# Patient Record
Sex: FEMALE | Race: WHITE | NOT HISPANIC OR LATINO | URBAN - METROPOLITAN AREA
[De-identification: names, ages, dates, MRNs, and addresses within clinical notes are randomized per-mention and may not be internally consistent; named-entity substitution may affect disease eponyms.]

---

## 2022-02-18 ENCOUNTER — OUTPATIENT (OUTPATIENT)
Dept: OUTPATIENT SERVICES | Facility: HOSPITAL | Age: 32
LOS: 1 days | End: 2022-02-18
Payer: COMMERCIAL

## 2022-02-18 VITALS
HEIGHT: 65 IN | TEMPERATURE: 98 F | OXYGEN SATURATION: 98 % | DIASTOLIC BLOOD PRESSURE: 85 MMHG | WEIGHT: 179.9 LBS | SYSTOLIC BLOOD PRESSURE: 125 MMHG | HEART RATE: 76 BPM | RESPIRATION RATE: 16 BRPM

## 2022-02-18 DIAGNOSIS — Z01.818 ENCOUNTER FOR OTHER PREPROCEDURAL EXAMINATION: ICD-10-CM

## 2022-02-18 DIAGNOSIS — F41.9 ANXIETY DISORDER, UNSPECIFIED: ICD-10-CM

## 2022-02-18 DIAGNOSIS — R10.9 UNSPECIFIED ABDOMINAL PAIN: ICD-10-CM

## 2022-02-18 DIAGNOSIS — Z98.1 ARTHRODESIS STATUS: Chronic | ICD-10-CM

## 2022-02-18 DIAGNOSIS — N83.209 UNSPECIFIED OVARIAN CYST, UNSPECIFIED SIDE: ICD-10-CM

## 2022-02-18 LAB
ALBUMIN SERPL ELPH-MCNC: 4 G/DL — SIGNIFICANT CHANGE UP (ref 3.3–5)
ALP SERPL-CCNC: 75 U/L — SIGNIFICANT CHANGE UP (ref 40–120)
ALT FLD-CCNC: 24 U/L — SIGNIFICANT CHANGE UP (ref 12–78)
ANION GAP SERPL CALC-SCNC: 5 MMOL/L — SIGNIFICANT CHANGE UP (ref 5–17)
AST SERPL-CCNC: 18 U/L — SIGNIFICANT CHANGE UP (ref 15–37)
BILIRUB SERPL-MCNC: 0.2 MG/DL — SIGNIFICANT CHANGE UP (ref 0.2–1.2)
BUN SERPL-MCNC: 10 MG/DL — SIGNIFICANT CHANGE UP (ref 7–23)
CALCIUM SERPL-MCNC: 9.1 MG/DL — SIGNIFICANT CHANGE UP (ref 8.5–10.1)
CHLORIDE SERPL-SCNC: 110 MMOL/L — HIGH (ref 96–108)
CO2 SERPL-SCNC: 26 MMOL/L — SIGNIFICANT CHANGE UP (ref 22–31)
CREAT SERPL-MCNC: 0.82 MG/DL — SIGNIFICANT CHANGE UP (ref 0.5–1.3)
GLUCOSE SERPL-MCNC: 99 MG/DL — SIGNIFICANT CHANGE UP (ref 70–99)
HCG SERPL-ACNC: <1 MIU/ML — SIGNIFICANT CHANGE UP
HCT VFR BLD CALC: 41.1 % — SIGNIFICANT CHANGE UP (ref 34.5–45)
HGB BLD-MCNC: 13.7 G/DL — SIGNIFICANT CHANGE UP (ref 11.5–15.5)
MCHC RBC-ENTMCNC: 29.9 PG — SIGNIFICANT CHANGE UP (ref 27–34)
MCHC RBC-ENTMCNC: 33.3 GM/DL — SIGNIFICANT CHANGE UP (ref 32–36)
MCV RBC AUTO: 89.7 FL — SIGNIFICANT CHANGE UP (ref 80–100)
NRBC # BLD: 0 /100 WBCS — SIGNIFICANT CHANGE UP (ref 0–0)
PLATELET # BLD AUTO: 302 K/UL — SIGNIFICANT CHANGE UP (ref 150–400)
POTASSIUM SERPL-MCNC: 4.2 MMOL/L — SIGNIFICANT CHANGE UP (ref 3.5–5.3)
POTASSIUM SERPL-SCNC: 4.2 MMOL/L — SIGNIFICANT CHANGE UP (ref 3.5–5.3)
PROT SERPL-MCNC: 7.6 G/DL — SIGNIFICANT CHANGE UP (ref 6–8.3)
RBC # BLD: 4.58 M/UL — SIGNIFICANT CHANGE UP (ref 3.8–5.2)
RBC # FLD: 11.5 % — SIGNIFICANT CHANGE UP (ref 10.3–14.5)
SODIUM SERPL-SCNC: 141 MMOL/L — SIGNIFICANT CHANGE UP (ref 135–145)
WBC # BLD: 6.56 K/UL — SIGNIFICANT CHANGE UP (ref 3.8–10.5)
WBC # FLD AUTO: 6.56 K/UL — SIGNIFICANT CHANGE UP (ref 3.8–10.5)

## 2022-02-18 PROCEDURE — G0463: CPT

## 2022-02-18 PROCEDURE — 84702 CHORIONIC GONADOTROPIN TEST: CPT

## 2022-02-18 PROCEDURE — 36415 COLL VENOUS BLD VENIPUNCTURE: CPT

## 2022-02-18 PROCEDURE — 86900 BLOOD TYPING SEROLOGIC ABO: CPT

## 2022-02-18 PROCEDURE — 85027 COMPLETE CBC AUTOMATED: CPT

## 2022-02-18 PROCEDURE — 80053 COMPREHEN METABOLIC PANEL: CPT

## 2022-02-18 PROCEDURE — 86901 BLOOD TYPING SEROLOGIC RH(D): CPT

## 2022-02-18 PROCEDURE — 86850 RBC ANTIBODY SCREEN: CPT

## 2022-02-18 NOTE — H&P PST ADULT - HISTORY OF PRESENT ILLNESS
30 yo F with h/o anxiety c/o missed  2021- s/p pelvic sonogram revealed ovarian cyst- scheduled for dilation & curettage hysteroscopy/laparoscopic right ovarian cystectomy on 22  **Pt denies any fever, chills, or sick contacts  **Covid 19 PCR to be scheduled

## 2022-02-18 NOTE — H&P PST ADULT - NSANTHOSAYNRD_GEN_A_CORE
No. LEX screening performed.  STOP BANG Legend: 0-2 = LOW Risk; 3-4 = INTERMEDIATE Risk; 5-8 = HIGH Risk

## 2022-02-18 NOTE — H&P PST ADULT - NSICDXFAMHXNEG_GEN_ALL
asthma/brain aneurysm/COPD/coronary disease/diabetes/dementia/emphysema/heart disease/irritable bowel syndrome/kidney disease

## 2022-02-18 NOTE — H&P PST ADULT - PROBLEM SELECTOR PLAN 1
D&C hysteroscopy, Laparoscopic right ovarian cystectomy- ligasure  Labs- CBC, CMP, HCG, T&S  Pre op instructions discussed

## 2022-02-24 ENCOUNTER — TRANSCRIPTION ENCOUNTER (OUTPATIENT)
Age: 32
End: 2022-02-24

## 2022-02-24 RX ORDER — HYDROMORPHONE HYDROCHLORIDE 2 MG/ML
0.5 INJECTION INTRAMUSCULAR; INTRAVENOUS; SUBCUTANEOUS ONCE
Refills: 0 | Status: DISCONTINUED | OUTPATIENT
Start: 2022-02-25 | End: 2022-02-25

## 2022-02-24 RX ORDER — ONDANSETRON 8 MG/1
4 TABLET, FILM COATED ORAL ONCE
Refills: 0 | Status: DISCONTINUED | OUTPATIENT
Start: 2022-02-25 | End: 2022-02-26

## 2022-02-24 RX ORDER — ACETAMINOPHEN 500 MG
1000 TABLET ORAL ONCE
Refills: 0 | Status: DISCONTINUED | OUTPATIENT
Start: 2022-02-25 | End: 2022-02-26

## 2022-02-24 RX ORDER — HYDROMORPHONE HYDROCHLORIDE 2 MG/ML
1 INJECTION INTRAMUSCULAR; INTRAVENOUS; SUBCUTANEOUS ONCE
Refills: 0 | Status: DISCONTINUED | OUTPATIENT
Start: 2022-02-25 | End: 2022-02-26

## 2022-02-24 RX ORDER — SODIUM CHLORIDE 9 MG/ML
1000 INJECTION, SOLUTION INTRAVENOUS
Refills: 0 | Status: DISCONTINUED | OUTPATIENT
Start: 2022-02-25 | End: 2022-02-25

## 2022-02-25 ENCOUNTER — INPATIENT (INPATIENT)
Facility: HOSPITAL | Age: 32
LOS: 5 days | Discharge: HOME | DRG: 742 | End: 2022-03-03
Attending: STUDENT IN AN ORGANIZED HEALTH CARE EDUCATION/TRAINING PROGRAM | Admitting: STUDENT IN AN ORGANIZED HEALTH CARE EDUCATION/TRAINING PROGRAM
Payer: COMMERCIAL

## 2022-02-25 ENCOUNTER — RESULT REVIEW (OUTPATIENT)
Age: 32
End: 2022-02-25

## 2022-02-25 VITALS
DIASTOLIC BLOOD PRESSURE: 71 MMHG | OXYGEN SATURATION: 99 % | RESPIRATION RATE: 16 BRPM | SYSTOLIC BLOOD PRESSURE: 115 MMHG | HEART RATE: 74 BPM | TEMPERATURE: 98 F

## 2022-02-25 DIAGNOSIS — N83.209 UNSPECIFIED OVARIAN CYST, UNSPECIFIED SIDE: ICD-10-CM

## 2022-02-25 DIAGNOSIS — R10.9 UNSPECIFIED ABDOMINAL PAIN: ICD-10-CM

## 2022-02-25 DIAGNOSIS — Z98.1 ARTHRODESIS STATUS: Chronic | ICD-10-CM

## 2022-02-25 LAB
ALBUMIN SERPL ELPH-MCNC: 2.3 G/DL — LOW (ref 3.3–5)
ALP SERPL-CCNC: 39 U/L — LOW (ref 40–120)
ALT FLD-CCNC: 17 U/L — SIGNIFICANT CHANGE UP (ref 12–78)
ANION GAP SERPL CALC-SCNC: 8 MMOL/L — SIGNIFICANT CHANGE UP (ref 5–17)
APTT BLD: 23.5 SEC — LOW (ref 27.5–35.5)
AST SERPL-CCNC: 19 U/L — SIGNIFICANT CHANGE UP (ref 15–37)
BASE EXCESS BLDA CALC-SCNC: -5.3 MMOL/L — LOW (ref -2–3)
BASE EXCESS BLDA CALC-SCNC: -6.4 MMOL/L — LOW (ref -2–3)
BASOPHILS # BLD AUTO: 0 K/UL — SIGNIFICANT CHANGE UP (ref 0–0.2)
BASOPHILS NFR BLD AUTO: 0 % — SIGNIFICANT CHANGE UP (ref 0–2)
BILIRUB SERPL-MCNC: 0.4 MG/DL — SIGNIFICANT CHANGE UP (ref 0.2–1.2)
BLOOD GAS COMMENTS ARTERIAL: SIGNIFICANT CHANGE UP
BLOOD GAS COMMENTS ARTERIAL: SIGNIFICANT CHANGE UP
BUN SERPL-MCNC: 9 MG/DL — SIGNIFICANT CHANGE UP (ref 7–23)
CALCIUM SERPL-MCNC: 7.2 MG/DL — LOW (ref 8.5–10.1)
CHLORIDE SERPL-SCNC: 115 MMOL/L — HIGH (ref 96–108)
CO2 SERPL-SCNC: 20 MMOL/L — LOW (ref 22–31)
CREAT SERPL-MCNC: 0.88 MG/DL — SIGNIFICANT CHANGE UP (ref 0.5–1.3)
EOSINOPHIL # BLD AUTO: 0 K/UL — SIGNIFICANT CHANGE UP (ref 0–0.5)
EOSINOPHIL NFR BLD AUTO: 0 % — SIGNIFICANT CHANGE UP (ref 0–6)
GLUCOSE SERPL-MCNC: 181 MG/DL — HIGH (ref 70–99)
HCO3 BLDA-SCNC: 19 MMOL/L — LOW (ref 21–28)
HCO3 BLDA-SCNC: 21 MMOL/L — SIGNIFICANT CHANGE UP (ref 21–28)
HCT VFR BLD CALC: 27.1 % — LOW (ref 34.5–45)
HCT VFR BLD CALC: 30.8 % — LOW (ref 34.5–45)
HCT VFR BLD CALC: 31 % — LOW (ref 34.5–45)
HCT VFR BLD CALC: 36.2 % — SIGNIFICANT CHANGE UP (ref 34.5–45)
HCT VFR BLD CALC: 37.6 % — SIGNIFICANT CHANGE UP (ref 34.5–45)
HGB BLD-MCNC: 10.5 G/DL — LOW (ref 11.5–15.5)
HGB BLD-MCNC: 10.9 G/DL — LOW (ref 11.5–15.5)
HGB BLD-MCNC: 12.6 G/DL — SIGNIFICANT CHANGE UP (ref 11.5–15.5)
HGB BLD-MCNC: 13 G/DL — SIGNIFICANT CHANGE UP (ref 11.5–15.5)
HGB BLD-MCNC: 9.3 G/DL — LOW (ref 11.5–15.5)
HOROWITZ INDEX BLDA+IHG-RTO: 40 — SIGNIFICANT CHANGE UP
INR BLD: 1.21 RATIO — HIGH (ref 0.88–1.16)
LACTATE SERPL-SCNC: 2.4 MMOL/L — HIGH (ref 0.7–2)
LACTATE SERPL-SCNC: 4.7 MMOL/L — CRITICAL HIGH (ref 0.7–2)
LACTATE SERPL-SCNC: 6 MMOL/L — CRITICAL HIGH (ref 0.7–2)
LYMPHOCYTES # BLD AUTO: 0.58 K/UL — LOW (ref 1–3.3)
LYMPHOCYTES # BLD AUTO: 2 % — LOW (ref 13–44)
MAGNESIUM SERPL-MCNC: 1.4 MG/DL — LOW (ref 1.6–2.6)
MCHC RBC-ENTMCNC: 29.4 PG — SIGNIFICANT CHANGE UP (ref 27–34)
MCHC RBC-ENTMCNC: 29.8 PG — SIGNIFICANT CHANGE UP (ref 27–34)
MCHC RBC-ENTMCNC: 30 PG — SIGNIFICANT CHANGE UP (ref 27–34)
MCHC RBC-ENTMCNC: 30.4 PG — SIGNIFICANT CHANGE UP (ref 27–34)
MCHC RBC-ENTMCNC: 30.4 PG — SIGNIFICANT CHANGE UP (ref 27–34)
MCHC RBC-ENTMCNC: 34.1 GM/DL — SIGNIFICANT CHANGE UP (ref 32–36)
MCHC RBC-ENTMCNC: 34.3 GM/DL — SIGNIFICANT CHANGE UP (ref 32–36)
MCHC RBC-ENTMCNC: 34.6 GM/DL — SIGNIFICANT CHANGE UP (ref 32–36)
MCHC RBC-ENTMCNC: 34.8 GM/DL — SIGNIFICANT CHANGE UP (ref 32–36)
MCHC RBC-ENTMCNC: 35.2 GM/DL — SIGNIFICANT CHANGE UP (ref 32–36)
MCV RBC AUTO: 84.6 FL — SIGNIFICANT CHANGE UP (ref 80–100)
MCV RBC AUTO: 86.2 FL — SIGNIFICANT CHANGE UP (ref 80–100)
MCV RBC AUTO: 86.6 FL — SIGNIFICANT CHANGE UP (ref 80–100)
MCV RBC AUTO: 88 FL — SIGNIFICANT CHANGE UP (ref 80–100)
MCV RBC AUTO: 88.6 FL — SIGNIFICANT CHANGE UP (ref 80–100)
MONOCYTES # BLD AUTO: 1.45 K/UL — HIGH (ref 0–0.9)
MONOCYTES NFR BLD AUTO: 5 % — SIGNIFICANT CHANGE UP (ref 2–14)
NEUTROPHILS # BLD AUTO: 26.9 K/UL — HIGH (ref 1.8–7.4)
NEUTROPHILS NFR BLD AUTO: 90 % — HIGH (ref 43–77)
NRBC # BLD: 0 /100 WBCS — SIGNIFICANT CHANGE UP (ref 0–0)
NRBC # BLD: SIGNIFICANT CHANGE UP /100 WBCS (ref 0–0)
PCO2 BLDA: 36 MMHG — HIGH (ref 32–35)
PCO2 BLDA: 39 MMHG — HIGH (ref 32–35)
PH BLDA: 7.33 — LOW (ref 7.35–7.45)
PH BLDA: 7.34 — LOW (ref 7.35–7.45)
PHOSPHATE SERPL-MCNC: 4 MG/DL — SIGNIFICANT CHANGE UP (ref 2.5–4.5)
PLATELET # BLD AUTO: 205 K/UL — SIGNIFICANT CHANGE UP (ref 150–400)
PLATELET # BLD AUTO: 215 K/UL — SIGNIFICANT CHANGE UP (ref 150–400)
PLATELET # BLD AUTO: 249 K/UL — SIGNIFICANT CHANGE UP (ref 150–400)
PLATELET # BLD AUTO: 298 K/UL — SIGNIFICANT CHANGE UP (ref 150–400)
PLATELET # BLD AUTO: 314 K/UL — SIGNIFICANT CHANGE UP (ref 150–400)
PO2 BLDA: 137 MMHG — HIGH (ref 83–108)
PO2 BLDA: 351 MMHG — HIGH (ref 83–108)
POTASSIUM SERPL-MCNC: 4.1 MMOL/L — SIGNIFICANT CHANGE UP (ref 3.5–5.3)
POTASSIUM SERPL-SCNC: 4.1 MMOL/L — SIGNIFICANT CHANGE UP (ref 3.5–5.3)
PROCALCITONIN SERPL-MCNC: 0.18 NG/ML — HIGH (ref 0–0.04)
PROT SERPL-MCNC: 4.2 G/DL — LOW (ref 6–8.3)
PROTHROM AB SERPL-ACNC: 14.2 SEC — HIGH (ref 10.5–13.4)
RBC # BLD: 3.06 M/UL — LOW (ref 3.8–5.2)
RBC # BLD: 3.5 M/UL — LOW (ref 3.8–5.2)
RBC # BLD: 3.58 M/UL — LOW (ref 3.8–5.2)
RBC # BLD: 4.28 M/UL — SIGNIFICANT CHANGE UP (ref 3.8–5.2)
RBC # BLD: 4.36 M/UL — SIGNIFICANT CHANGE UP (ref 3.8–5.2)
RBC # FLD: 11.9 % — SIGNIFICANT CHANGE UP (ref 10.3–14.5)
RBC # FLD: 12.4 % — SIGNIFICANT CHANGE UP (ref 10.3–14.5)
RBC # FLD: 12.7 % — SIGNIFICANT CHANGE UP (ref 10.3–14.5)
RBC # FLD: 13.1 % — SIGNIFICANT CHANGE UP (ref 10.3–14.5)
RBC # FLD: 13.4 % — SIGNIFICANT CHANGE UP (ref 10.3–14.5)
SAO2 % BLDA: 99.2 % — HIGH (ref 94–98)
SAO2 % BLDA: 99.8 % — HIGH (ref 94–98)
SODIUM SERPL-SCNC: 143 MMOL/L — SIGNIFICANT CHANGE UP (ref 135–145)
WBC # BLD: 16.89 K/UL — HIGH (ref 3.8–10.5)
WBC # BLD: 19.43 K/UL — HIGH (ref 3.8–10.5)
WBC # BLD: 26.34 K/UL — HIGH (ref 3.8–10.5)
WBC # BLD: 28.92 K/UL — HIGH (ref 3.8–10.5)
WBC # BLD: 35.58 K/UL — HIGH (ref 3.8–10.5)
WBC # FLD AUTO: 16.89 K/UL — HIGH (ref 3.8–10.5)
WBC # FLD AUTO: 19.43 K/UL — HIGH (ref 3.8–10.5)
WBC # FLD AUTO: 26.34 K/UL — HIGH (ref 3.8–10.5)
WBC # FLD AUTO: 28.92 K/UL — HIGH (ref 3.8–10.5)
WBC # FLD AUTO: 35.58 K/UL — HIGH (ref 3.8–10.5)

## 2022-02-25 PROCEDURE — 49000 EXPLORATION OF ABDOMEN: CPT | Mod: 62,22

## 2022-02-25 PROCEDURE — 93308 TTE F-UP OR LMTD: CPT | Mod: 26

## 2022-02-25 PROCEDURE — 99292 CRITICAL CARE ADDL 30 MIN: CPT

## 2022-02-25 PROCEDURE — 71045 X-RAY EXAM CHEST 1 VIEW: CPT | Mod: 26

## 2022-02-25 PROCEDURE — 33320 REPAIR MAJOR BLOOD VESSEL(S): CPT | Mod: 62,22

## 2022-02-25 PROCEDURE — 76937 US GUIDE VASCULAR ACCESS: CPT | Mod: 26

## 2022-02-25 PROCEDURE — 99291 CRITICAL CARE FIRST HOUR: CPT

## 2022-02-25 PROCEDURE — 88305 TISSUE EXAM BY PATHOLOGIST: CPT | Mod: 26

## 2022-02-25 PROCEDURE — 33320 REPAIR MAJOR BLOOD VESSEL(S): CPT | Mod: 22,62

## 2022-02-25 PROCEDURE — 76604 US EXAM CHEST: CPT | Mod: 26

## 2022-02-25 PROCEDURE — 74174 CTA ABD&PLVS W/CONTRAST: CPT | Mod: 26

## 2022-02-25 PROCEDURE — 71275 CT ANGIOGRAPHY CHEST: CPT | Mod: 26

## 2022-02-25 DEVICE — SURGIFOAM PAD 8CM X 12.5CM X 10MM (100): Type: IMPLANTABLE DEVICE | Status: FUNCTIONAL

## 2022-02-25 DEVICE — CLIP APPLIER COVIDIEN SURGICLIP 11.5" MEDIUM: Type: IMPLANTABLE DEVICE | Status: FUNCTIONAL

## 2022-02-25 DEVICE — SURGICEL POWDER 3 GRAMS: Type: IMPLANTABLE DEVICE | Status: FUNCTIONAL

## 2022-02-25 RX ORDER — PROPOFOL 10 MG/ML
20 INJECTION, EMULSION INTRAVENOUS
Qty: 1000 | Refills: 0 | Status: DISCONTINUED | OUTPATIENT
Start: 2022-02-25 | End: 2022-02-26

## 2022-02-25 RX ORDER — HYDROMORPHONE HYDROCHLORIDE 2 MG/ML
0.5 INJECTION INTRAMUSCULAR; INTRAVENOUS; SUBCUTANEOUS EVERY 4 HOURS
Refills: 0 | Status: DISCONTINUED | OUTPATIENT
Start: 2022-02-25 | End: 2022-02-28

## 2022-02-25 RX ORDER — CHLORHEXIDINE GLUCONATE 213 G/1000ML
15 SOLUTION TOPICAL EVERY 12 HOURS
Refills: 0 | Status: DISCONTINUED | OUTPATIENT
Start: 2022-02-25 | End: 2022-02-26

## 2022-02-25 RX ORDER — CALCIUM GLUCONATE 100 MG/ML
2 VIAL (ML) INTRAVENOUS ONCE
Refills: 0 | Status: COMPLETED | OUTPATIENT
Start: 2022-02-25 | End: 2022-02-25

## 2022-02-25 RX ORDER — PIPERACILLIN AND TAZOBACTAM 4; .5 G/20ML; G/20ML
3.38 INJECTION, POWDER, LYOPHILIZED, FOR SOLUTION INTRAVENOUS EVERY 8 HOURS
Refills: 0 | Status: DISCONTINUED | OUTPATIENT
Start: 2022-02-25 | End: 2022-03-02

## 2022-02-25 RX ORDER — PANTOPRAZOLE SODIUM 20 MG/1
40 TABLET, DELAYED RELEASE ORAL DAILY
Refills: 0 | Status: DISCONTINUED | OUTPATIENT
Start: 2022-02-25 | End: 2022-02-26

## 2022-02-25 RX ORDER — ACETAMINOPHEN 500 MG
1000 TABLET ORAL ONCE
Refills: 0 | Status: COMPLETED | OUTPATIENT
Start: 2022-02-25 | End: 2022-02-25

## 2022-02-25 RX ORDER — SODIUM CHLORIDE 9 MG/ML
1000 INJECTION, SOLUTION INTRAVENOUS
Refills: 0 | Status: DISCONTINUED | OUTPATIENT
Start: 2022-02-25 | End: 2022-02-26

## 2022-02-25 RX ORDER — FENTANYL CITRATE 50 UG/ML
100 INJECTION INTRAVENOUS ONCE
Refills: 0 | Status: DISCONTINUED | OUTPATIENT
Start: 2022-02-25 | End: 2022-02-25

## 2022-02-25 RX ORDER — PIPERACILLIN AND TAZOBACTAM 4; .5 G/20ML; G/20ML
3.38 INJECTION, POWDER, LYOPHILIZED, FOR SOLUTION INTRAVENOUS ONCE
Refills: 0 | Status: COMPLETED | OUTPATIENT
Start: 2022-02-25 | End: 2022-02-25

## 2022-02-25 RX ORDER — NOREPINEPHRINE BITARTRATE/D5W 8 MG/250ML
0.05 PLASTIC BAG, INJECTION (ML) INTRAVENOUS
Qty: 8 | Refills: 0 | Status: DISCONTINUED | OUTPATIENT
Start: 2022-02-25 | End: 2022-02-25

## 2022-02-25 RX ORDER — HYDRALAZINE HCL 50 MG
10 TABLET ORAL EVERY 6 HOURS
Refills: 0 | Status: DISCONTINUED | OUTPATIENT
Start: 2022-02-25 | End: 2022-02-26

## 2022-02-25 RX ORDER — SODIUM CHLORIDE 9 MG/ML
1000 INJECTION, SOLUTION INTRAVENOUS ONCE
Refills: 0 | Status: COMPLETED | OUTPATIENT
Start: 2022-02-25 | End: 2022-02-25

## 2022-02-25 RX ORDER — SODIUM CHLORIDE 9 MG/ML
1000 INJECTION, SOLUTION INTRAVENOUS
Refills: 0 | Status: DISCONTINUED | OUTPATIENT
Start: 2022-02-25 | End: 2022-02-25

## 2022-02-25 RX ORDER — MAGNESIUM SULFATE 500 MG/ML
2 VIAL (ML) INJECTION ONCE
Refills: 0 | Status: COMPLETED | OUTPATIENT
Start: 2022-02-25 | End: 2022-02-25

## 2022-02-25 RX ORDER — DEXMEDETOMIDINE HYDROCHLORIDE IN 0.9% SODIUM CHLORIDE 4 UG/ML
0.2 INJECTION INTRAVENOUS
Qty: 400 | Refills: 0 | Status: DISCONTINUED | OUTPATIENT
Start: 2022-02-25 | End: 2022-02-25

## 2022-02-25 RX ORDER — CHLORHEXIDINE GLUCONATE 213 G/1000ML
1 SOLUTION TOPICAL
Refills: 0 | Status: DISCONTINUED | OUTPATIENT
Start: 2022-02-25 | End: 2022-03-03

## 2022-02-25 RX ORDER — DEXMEDETOMIDINE HYDROCHLORIDE IN 0.9% SODIUM CHLORIDE 4 UG/ML
0.2 INJECTION INTRAVENOUS
Qty: 200 | Refills: 0 | Status: DISCONTINUED | OUTPATIENT
Start: 2022-02-25 | End: 2022-02-25

## 2022-02-25 RX ADMIN — HYDROMORPHONE HYDROCHLORIDE 0.5 MILLIGRAM(S): 2 INJECTION INTRAMUSCULAR; INTRAVENOUS; SUBCUTANEOUS at 23:12

## 2022-02-25 RX ADMIN — SODIUM CHLORIDE 1000 MILLILITER(S): 9 INJECTION, SOLUTION INTRAVENOUS at 13:37

## 2022-02-25 RX ADMIN — PROPOFOL 11 MICROGRAM(S)/KG/MIN: 10 INJECTION, EMULSION INTRAVENOUS at 15:44

## 2022-02-25 RX ADMIN — CHLORHEXIDINE GLUCONATE 15 MILLILITER(S): 213 SOLUTION TOPICAL at 18:46

## 2022-02-25 RX ADMIN — HYDROMORPHONE HYDROCHLORIDE 0.5 MILLIGRAM(S): 2 INJECTION INTRAMUSCULAR; INTRAVENOUS; SUBCUTANEOUS at 20:34

## 2022-02-25 RX ADMIN — Medication 400 MILLIGRAM(S): at 18:46

## 2022-02-25 RX ADMIN — SODIUM CHLORIDE 125 MILLILITER(S): 9 INJECTION, SOLUTION INTRAVENOUS at 17:00

## 2022-02-25 RX ADMIN — PIPERACILLIN AND TAZOBACTAM 25 GRAM(S): 4; .5 INJECTION, POWDER, LYOPHILIZED, FOR SOLUTION INTRAVENOUS at 21:00

## 2022-02-25 RX ADMIN — Medication 25 GRAM(S): at 16:01

## 2022-02-25 RX ADMIN — Medication 10 MILLIGRAM(S): at 16:50

## 2022-02-25 RX ADMIN — PROPOFOL 11 MICROGRAM(S)/KG/MIN: 10 INJECTION, EMULSION INTRAVENOUS at 23:47

## 2022-02-25 RX ADMIN — HYDROMORPHONE HYDROCHLORIDE 0.5 MILLIGRAM(S): 2 INJECTION INTRAMUSCULAR; INTRAVENOUS; SUBCUTANEOUS at 23:27

## 2022-02-25 RX ADMIN — Medication 200 GRAM(S): at 15:43

## 2022-02-25 RX ADMIN — FENTANYL CITRATE 100 MICROGRAM(S): 50 INJECTION INTRAVENOUS at 17:41

## 2022-02-25 RX ADMIN — PROPOFOL 11 MICROGRAM(S)/KG/MIN: 10 INJECTION, EMULSION INTRAVENOUS at 19:34

## 2022-02-25 RX ADMIN — PANTOPRAZOLE SODIUM 40 MILLIGRAM(S): 20 TABLET, DELAYED RELEASE ORAL at 15:42

## 2022-02-25 RX ADMIN — PIPERACILLIN AND TAZOBACTAM 200 GRAM(S): 4; .5 INJECTION, POWDER, LYOPHILIZED, FOR SOLUTION INTRAVENOUS at 13:36

## 2022-02-25 RX ADMIN — HYDROMORPHONE HYDROCHLORIDE 0.5 MILLIGRAM(S): 2 INJECTION INTRAMUSCULAR; INTRAVENOUS; SUBCUTANEOUS at 20:19

## 2022-02-25 NOTE — BRIEF OPERATIVE NOTE - NSICDXBRIEFPREOP_GEN_ALL_CORE_FT
PRE-OP DIAGNOSIS:  Injury to abdominal aorta, initial encounter 28-Feb-2022 10:41:16  Claudia Sanchez  
PRE-OP DIAGNOSIS:  Right ovarian cyst 25-Feb-2022 13:30:19  Tabby Valiente

## 2022-02-25 NOTE — PATIENT PROFILE ADULT - FUNCTIONAL ASSESSMENT - BASIC MOBILITY 3.
22 y/o female with no pertinent PMHx presents to the ED with complaints of right-sided flank pain with radiation to left abd since this morning. 22 y/o female with no pertinent PMHx presents to the ED with complaints of right-sided flank pain with radiation to left abd since this morning. Pain is constant in nature and exacerbated with deep breathing. She denies any hx of UTI or gallstones. Denies taking any pain medications PTA. No fever, no nausea, no vomiting, no diarrhea, no urinary sx. 4 = No assist / stand by assistance

## 2022-02-25 NOTE — CONSULT NOTE ADULT - SUBJECTIVE AND OBJECTIVE BOX
Patient is a 31y old  Female who presents with a chief complaint of     BRIEF HOSPITAL COURSE: Patient is a 32 yo female with a pmh of missed  and ovarian cyst who presented to [facility] for an elective D&C hysteroscopy and laparoscopic right ovarian cystectomy. Patient in OR with injury to aorta resulting in increased blood loss, primary repair of injury and open approach to cyst removal. ICU consulted for post-operative care as patient requiring multiple PRBC transfusions, hemodynamic instability and post-operative retroperitoneal bleed.  Upon arrival of to ICU, patient is intubated with abdominal binder in place. Patient escorted by anesthesia and GYN attending. Patient is awakening to stimulation and following commands. L arterial line in place. Patient tachycardic and normotensive on ECG monitor.   Patient to be admitted to ICU with plans of extubation once optimized.       Events last 24 hours: ***    PAST MEDICAL & SURGICAL HISTORY:  Moderate anxiety    Missed   2021    Ectopic pregnancy  2022    History of spinal fusion for scoliosis          Review of Systems:  Unable to be completed as patient is intubated and unable to speak/communicate.     Medications:  piperacillin/tazobactam IVPB. 3.375 Gram(s) IV Intermittent once  piperacillin/tazobactam IVPB.. 3.375 Gram(s) IV Intermittent every 8 hours  acetaminophen   IVPB .. 1000 milliGRAM(s) IV Intermittent Once PRN  dexMEDEtomidine Infusion 0.2 MICROgram(s)/kG/Hr IV Continuous <Continuous>  HYDROmorphone  Injectable 0.5 milliGRAM(s) IV Push Once PRN  HYDROmorphone  Injectable 1 milliGRAM(s) IV Push Once PRN  ondansetron Injectable 4 milliGRAM(s) IV Push Once PRN  pantoprazole  Injectable 40 milliGRAM(s) IV Push daily  lactated ringers Bolus 1000 milliLiter(s) IV Bolus once  lactated ringers. 1000 milliLiter(s) IV Continuous <Continuous>  lactated ringers. 1000 milliLiter(s) IV Continuous <Continuous>  chlorhexidine 0.12% Liquid 15 milliLiter(s) Oral Mucosa every 12 hours  chlorhexidine 2% Cloths 1 Application(s) Topical <User Schedule>      ICU Vital Signs Last 24 Hrs  T(C): 37.2 (2022 12:45), Max: 37.2 (2022 12:45)  T(F): 99 (2022 12:45), Max: 99 (2022 12:45)  HR: 97 (2022 13:20) (74 - 126)  BP: 115/71 (2022 06:36) (115/71 - 115/71)  BP(mean): --  ABP: 104/59 (2022 13:20) (80/49 - 115/67)  ABP(mean): 73 (2022 13:20) (55 - 81)  RR: 16 (2022 13:20) (10 - 18)  SpO2: 100% (2022 13:20) (99% - 100%)      ABG - ( 2022 09:18 )  pH, Arterial: 7.33  pH, Blood: x     /  pCO2: 39    /  pO2: 351   / HCO3: 21    / Base Excess: -5.3  /  SaO2: 99.2        I&O's Detail        LABS:                        10.9   35.58 )-----------( 249      ( 2022 10:21 )             31.0       CAPILLARY BLOOD GLUCOSE        CULTURES:      Physical Examination:    General: Intubated, appears comfortable, post operative.     HEENT: Pupils equal, reactive to light.  Symmetric.    PULM: Clear to auscultation bilaterally, no significant sputum production. On mechanical vent.    CVS: Tachycardic rate and sinus rhythm, no murmurs, rubs, or gallops    ABD: Unable to full assess. Large abdominal incision in place. Dressing CDI, minor marks of bleeding noted, appears contained/stopped. Grimacing to palpation, appropriate for post surgical state.     EXT: No edema    SKIN: Pallor in color Warm, no rashes noted. Incision as above.    NEURO: Awakening to voice, following commands, moving extremities. Unable to fully assess as patient is intubated and recovering from anesthesia.     CRITICAL CARE TIME SPENT: 52 Minutes.  Evaluating/treating patient, reviewing data/labs/imaging, discussing case with multidisciplinary team, discussing plan/goals of care with patient/family. Non-inclusive of procedure time.   Patient is a 31y old  Female who presents with a chief complaint of     BRIEF HOSPITAL COURSE: Patient is a 30 yo female with a pmh of anxiety, missed  and ovarian cyst who presented to [facility] for an elective D&C hysteroscopy and laparoscopic right ovarian cystectomy. Patient in OR with injury to aorta resulting in increased blood loss, primary repair of injury and open approach to cyst removal. Prior to OR patient with hgb of 13.7, repeat in OR 9.3, WBC from 6.56 to 26.24. ABG obtained showing 7.33/co2 39/po2 351/hco3 21. ICU consulted for post-operative care as patient requiring multiple PRBC transfusions, hemodynamic instability and post-operative retroperitoneal bleed. In OR patient received x2 PRBC, and 3.5L IV crystalloid with 325 of UO. Decision was made collaboratively to leave patient intubated in the post operative stage.   Upon arrival of to ICU, patient is intubated with abdominal binder in place. Patient escorted by anesthesia and GYN attending. Patient is awakening to stimulation and following commands. L arterial line in place. Patient tachycardic and normotensive on ECG monitor. subsequent hgb 10.9 with wbc of 35.58.  Patient to be admitted to ICU with plans of extubation once optimized.     Events last 24 hours: Patient to OR and admitted to ICU as per HPI.    PAST MEDICAL & SURGICAL HISTORY:  Moderate anxiety  Missed   2021  Ectopic pregnancy  2022  History of spinal fusion for scoliosis        Review of Systems:  Unable to be completed as patient is intubated and unable to speak/communicate.     Medications:  piperacillin/tazobactam IVPB. 3.375 Gram(s) IV Intermittent once  piperacillin/tazobactam IVPB.. 3.375 Gram(s) IV Intermittent every 8 hours  acetaminophen   IVPB .. 1000 milliGRAM(s) IV Intermittent Once PRN  dexMEDEtomidine Infusion 0.2 MICROgram(s)/kG/Hr IV Continuous <Continuous>  HYDROmorphone  Injectable 0.5 milliGRAM(s) IV Push Once PRN  HYDROmorphone  Injectable 1 milliGRAM(s) IV Push Once PRN  ondansetron Injectable 4 milliGRAM(s) IV Push Once PRN  pantoprazole  Injectable 40 milliGRAM(s) IV Push daily  lactated ringers Bolus 1000 milliLiter(s) IV Bolus once  lactated ringers. 1000 milliLiter(s) IV Continuous <Continuous>  lactated ringers. 1000 milliLiter(s) IV Continuous <Continuous>  chlorhexidine 0.12% Liquid 15 milliLiter(s) Oral Mucosa every 12 hours  chlorhexidine 2% Cloths 1 Application(s) Topical <User Schedule>      ICU Vital Signs Last 24 Hrs  T(C): 37.2 (2022 12:45), Max: 37.2 (2022 12:45)  T(F): 99 (2022 12:45), Max: 99 (2022 12:45)  HR: 97 (2022 13:20) (74 - 126)  BP: 115/71 (2022 06:36) (115/71 - 115/71)  BP(mean): --  ABP: 104/59 (2022 13:20) (80/49 - 115/67)  ABP(mean): 73 (2022 13:20) (55 - 81)  RR: 16 (2022 13:20) (10 - 18)  SpO2: 100% (2022 13:20) (99% - 100%)      ABG - ( 2022 09:18 )  pH, Arterial: 7.33  pH, Blood: x     /  pCO2: 39    /  pO2: 351   / HCO3: 21    / Base Excess: -5.3  /  SaO2: 99.2        I&O's Detail      LABS:                        10.9   35.58 )-----------( 249      ( 2022 10:21 )             31.0       CAPILLARY BLOOD GLUCOSE      CULTURES:      Physical Examination:    General: Intubated, appears comfortable, post operative.     HEENT: Pupils equal, reactive to light.  Symmetric.    PULM: Clear to auscultation bilaterally, no significant sputum production. On mechanical vent.    CVS: Tachycardic rate and sinus rhythm, no murmurs, rubs, or gallops    ABD: Unable to full assess. Large abdominal incision in place. Dressing CDI, minor marks of bleeding noted, appears contained/stopped. Grimacing to palpation, appropriate for post surgical state.     EXT: No edema    SKIN: Pallor in color Warm, no rashes noted. Incision as above.    NEURO: Awakening to voice, following commands, moving extremities. Unable to fully assess as patient is intubated and recovering from anesthesia.     CRITICAL CARE TIME SPENT: 52 Minutes.  Evaluating/treating patient, reviewing data/labs/imaging, discussing case with multidisciplinary team, discussing plan/goals of care with patient/family. Non-inclusive of procedure time.   Patient is a 31y old  Female who presents with a chief complaint of     BRIEF HOSPITAL COURSE: Patient is a 30 yo female with a pmh of anxiety, missed  and ovarian cyst who presented to [facility] for an elective D&C hysteroscopy and laparoscopic right ovarian cystectomy. Patient in OR with injury to aorta resulting in increased blood loss, primary repair of injury and open approach to cyst removal. Prior to OR patient with hgb of 13.7, repeat in OR 9.3, WBC from 6.56 to 26.24. ABG obtained showing 7.33/co2 39/po2 351/hco3 21. ICU consulted for post-operative care as patient requiring multiple PRBC transfusions, hemodynamic instability and post-operative retroperitoneal bleed. In OR patient received x2 PRBC, and 3.5L IV crystalloid with 325 of UO. Decision was made collaboratively to leave patient intubated in the post operative stage.   Upon arrival of to ICU, patient is intubated with abdominal binder in place. Patient escorted by anesthesia and GYN attending. Patient is awakening to stimulation and following commands. L arterial line in place. Patient tachycardic and normotensive on ECG monitor. Subsequent hgb 10.9 with wbc of 35.58.  Patient to be admitted to ICU with plans of extubation once optimized.   Addendum: Patient with acute hypotension, worsening hemorrhagic shock, MTP initiated. CT with contrast obtained. See plan and assessment below.     Events last 24 hours: Patient to OR and admitted to ICU as per HPI.    PAST MEDICAL & SURGICAL HISTORY:  Moderate anxiety  Missed   2021  Ectopic pregnancy  2022  History of spinal fusion for scoliosis        Review of Systems:  Unable to be completed as patient is intubated and unable to speak/communicate.     Medications:  piperacillin/tazobactam IVPB. 3.375 Gram(s) IV Intermittent once  piperacillin/tazobactam IVPB.. 3.375 Gram(s) IV Intermittent every 8 hours  acetaminophen   IVPB .. 1000 milliGRAM(s) IV Intermittent Once PRN  dexMEDEtomidine Infusion 0.2 MICROgram(s)/kG/Hr IV Continuous <Continuous>  HYDROmorphone  Injectable 0.5 milliGRAM(s) IV Push Once PRN  HYDROmorphone  Injectable 1 milliGRAM(s) IV Push Once PRN  ondansetron Injectable 4 milliGRAM(s) IV Push Once PRN  pantoprazole  Injectable 40 milliGRAM(s) IV Push daily  lactated ringers Bolus 1000 milliLiter(s) IV Bolus once  lactated ringers. 1000 milliLiter(s) IV Continuous <Continuous>  lactated ringers. 1000 milliLiter(s) IV Continuous <Continuous>  chlorhexidine 0.12% Liquid 15 milliLiter(s) Oral Mucosa every 12 hours  chlorhexidine 2% Cloths 1 Application(s) Topical <User Schedule>      ICU Vital Signs Last 24 Hrs  T(C): 37.2 (2022 12:45), Max: 37.2 (2022 12:45)  T(F): 99 (2022 12:45), Max: 99 (2022 12:45)  HR: 97 (2022 13:20) (74 - 126)  BP: 115/71 (2022 06:36) (115/71 - 115/71)  BP(mean): --  ABP: 104/59 (2022 13:20) (80/49 - 115/67)  ABP(mean): 73 (2022 13:20) (55 - 81)  RR: 16 (2022 13:20) (10 - 18)  SpO2: 100% (2022 13:20) (99% - 100%)      ABG - ( 2022 09:18 )  pH, Arterial: 7.33  pH, Blood: x     /  pCO2: 39    /  pO2: 351   / HCO3: 21    / Base Excess: -5.3  /  SaO2: 99.2        I&O's Detail      LABS:                        10.9   35.58 )-----------( 249      ( 2022 10:21 )             31.0       CAPILLARY BLOOD GLUCOSE      CULTURES:      Physical Examination:    General: Intubated, appears comfortable, post operative.     HEENT: Pupils equal, reactive to light.  Symmetric.    PULM: Clear to auscultation bilaterally, no significant sputum production. On mechanical vent.    CVS: Tachycardic rate and sinus rhythm, no murmurs, rubs, or gallops    ABD: Unable to full assess. Large abdominal incision in place. Dressing CDI, minor marks of bleeding noted, appears contained/stopped. Grimacing to palpation, appropriate for post surgical state.     EXT: No edema    SKIN: Pallor in color Warm, no rashes noted. Incision as above.    NEURO: Awakening to voice, following commands, moving extremities. Unable to fully assess as patient is intubated and recovering from anesthesia.     CRITICAL CARE TIME SPENT: 52 Minutes.  Evaluating/treating patient, reviewing data/labs/imaging, discussing case with multidisciplinary team, discussing plan/goals of care with patient/family. Non-inclusive of procedure time.  Additional CRITICAL CARE TIME SPENT 40 minutes. See changes in bold. Evaluating/treating patient, reviewing data/labs/imaging, discussing case with multidisciplinary team, discussing plan/goals of care with patient/family. Non-inclusive of procedure time.   Patient is a 31y old  Female who presents with a chief complaint of     BRIEF HOSPITAL COURSE: Patient is a 32 yo female with a pmh of anxiety, missed  and ovarian cyst who presented to [facility] for an elective D&C hysteroscopy and laparoscopic right ovarian cystectomy. Patient in OR with injury to aorta resulting in increased blood loss, primary repair of injury and open approach to cyst removal. Prior to OR patient with hgb of 13.7, repeat in OR 9.3, WBC from 6.56 to 26.24. ABG obtained showing 7.33/co2 39/po2 351/hco3 21. ICU consulted for post-operative care as patient requiring multiple PRBC transfusions, hemodynamic instability and post-operative retroperitoneal bleed. In OR patient received x2 PRBC, and 3.5L IV crystalloid with 325 of UO. Decision was made collaboratively to leave patient intubated in the post operative stage.   Upon arrival of to ICU, patient is intubated with abdominal binder in place. Patient escorted by anesthesia and GYN attending. Patient is awakening to stimulation and following commands. L arterial line in place. Patient tachycardic and normotensive on ECG monitor. Subsequent hgb 10.9 with wbc of 35.58.  Patient to be admitted to ICU with plans of extubation once optimized.   Addendum: Patient with acute hypotension, worsening hemorrhagic shock, MTP initiated. CT with contrast obtained. See plan and assessment below.     Events last 24 hours: Patient to OR and admitted to ICU as per HPI.    PAST MEDICAL & SURGICAL HISTORY:  Moderate anxiety  Missed   2021  Ectopic pregnancy  2022  History of spinal fusion for scoliosis        Review of Systems:  Unable to be completed as patient is intubated and unable to speak/communicate.     Medications:  piperacillin/tazobactam IVPB. 3.375 Gram(s) IV Intermittent once  piperacillin/tazobactam IVPB.. 3.375 Gram(s) IV Intermittent every 8 hours  acetaminophen   IVPB .. 1000 milliGRAM(s) IV Intermittent Once PRN  dexMEDEtomidine Infusion 0.2 MICROgram(s)/kG/Hr IV Continuous <Continuous>  HYDROmorphone  Injectable 0.5 milliGRAM(s) IV Push Once PRN  HYDROmorphone  Injectable 1 milliGRAM(s) IV Push Once PRN  ondansetron Injectable 4 milliGRAM(s) IV Push Once PRN  pantoprazole  Injectable 40 milliGRAM(s) IV Push daily  lactated ringers Bolus 1000 milliLiter(s) IV Bolus once  lactated ringers. 1000 milliLiter(s) IV Continuous <Continuous>  lactated ringers. 1000 milliLiter(s) IV Continuous <Continuous>  chlorhexidine 0.12% Liquid 15 milliLiter(s) Oral Mucosa every 12 hours  chlorhexidine 2% Cloths 1 Application(s) Topical <User Schedule>      ICU Vital Signs Last 24 Hrs  T(C): 37.2 (2022 12:45), Max: 37.2 (2022 12:45)  T(F): 99 (2022 12:45), Max: 99 (2022 12:45)  HR: 97 (2022 13:20) (74 - 126)  BP: 115/71 (2022 06:36) (115/71 - 115/71)  BP(mean): --  ABP: 104/59 (2022 13:20) (80/49 - 115/67)  ABP(mean): 73 (2022 13:20) (55 - 81)  RR: 16 (2022 13:20) (10 - 18)  SpO2: 100% (2022 13:20) (99% - 100%)      ABG - ( 2022 09:18 )  pH, Arterial: 7.33  pH, Blood: x     /  pCO2: 39    /  pO2: 351   / HCO3: 21    / Base Excess: -5.3  /  SaO2: 99.2        I&O's Detail      LABS:                        10.9   35.58 )-----------( 249      ( 2022 10:21 )             31.0       CAPILLARY BLOOD GLUCOSE      CULTURES:      Physical Examination:    General: Intubated, appears comfortable, post operative.     HEENT: Pupils equal, reactive to light.  Symmetric.    PULM: Clear to auscultation bilaterally, no significant sputum production. On mechanical vent.    CVS: Tachycardic rate and sinus rhythm, no murmurs, rubs, or gallops    ABD: Unable to full assess. Large abdominal incision in place. Dressing CDI, minor marks of bleeding noted, appears contained/stopped. Grimacing to palpation, appropriate for post surgical state.     EXT: No edema    SKIN: Pallor in color Warm, no rashes noted. Incision as above.    NEURO: Awakening to voice, following commands, moving extremities. Unable to fully assess as patient is intubated and recovering from anesthesia.     CRITICAL CARE TIME SPENT: 52 Minutes.  Evaluating/treating patient, reviewing data/labs/imaging, discussing case with multidisciplinary team, discussing plan/goals of care with patient/family. Non-inclusive of procedure time.  Additional CRITICAL CARE TIME SPENT 40 minutes. See changes in bold. Evaluating/treating patient, reviewing data/labs/imaging, discussing case with multidisciplinary team, discussing plan/goals of care with patient/family. Non-inclusive of procedure time.  TOTAL CC TIME: 92 Minutes

## 2022-02-25 NOTE — CONSULT NOTE ADULT - ASSESSMENT
31F with PMHx anxiety, missed , ovarian cyst who was planned for elective D&C and laparoscopic right ovarian cystectomy. Operative course c/b hemorrhagic shock requiring MTP 2/2 aortic injury that was repaired by vascular surgery intraop    Neuro: continue propofol for sedation, daily awakening trials  Resp: intubated and mechanically ventilated, would keep intubated for now given MTP and risk for fluid shifts/TRALI/etc  CV: tachycardic likely multifactorial 2/2 acute stress, hemorrhagic shock, etc; hemorrhagic shock - vasopressors as needed to maintain MAP>65  GI: NPO/ NGT to suction. Monitor for compartment syndrome - decreased urine output, paralyzed bladder pressure 20 or above with evidence of end organ damage.  : evans, monitor strict I/Os. Check paralyzed bladder pressure if any concern for abdominal compartment syndrome (decreased urine output, evidence of end organ damage).  Heme: Hemorrhagic shock 2/2 acute blood loss anemia, continue to transfuse as necessary, trend H&H         31F with PMHx anxiety, missed , ovarian cyst who was planned for elective D&C and laparoscopic right ovarian cystectomy. Operative course c/b hemorrhagic shock requiring MTP 2/2 aortic injury that was repaired by vascular surgery intraop    Neuro: continue propofol for sedation, daily awakening trials  Resp: intubated and mechanically ventilated, would keep intubated for now given MTP and risk for fluid shifts/TRALI/etc  CV: tachycardic likely multifactorial 2/2 acute stress, hemorrhagic shock, etc; hemorrhagic shock - vasopressors as needed to maintain MAP>65; Lactate 6 - would trend for now, a rising lactate could indicate ongoing bleeding or ischemia  GI: NPO/ NGT to suction. Monitor for compartment syndrome - decreased urine output, paralyzed bladder pressure 20 or above with evidence of end organ damage.  : evans, monitor strict I/Os. Check paralyzed bladder pressure if any concern for abdominal compartment syndrome (decreased urine output, evidence of end organ damage).  Heme: Hemorrhagic shock 2/2 acute blood loss anemia, continue to transfuse as necessary, trend H&H

## 2022-02-25 NOTE — PATIENT PROFILE ADULT - FALL HARM RISK - HARM RISK INTERVENTIONS

## 2022-02-25 NOTE — CONSULT NOTE ADULT - ASSESSMENT
Patient is a 32 yo female with a pmh of missed  and ovarian cyst who is being admitted to ICU post operatively from a D&C hysteroscopy and laparoscopic right ovarian cystectomy that was complicated by injury to the aorta resulting in open approach repair. Patient being admitted to ICU with hypovolemic shock, anemia with acute blood loss, retroperitoneal bleed and leukocytosis.   Plan:  - Hypovolemic shock   - Anemia with acute blood loss  - Retroperitoneal bleed  - Leukocytosis  Plan:  Neuro: Awakening upon arrival, will sedate with precedex at this time. Will do SAT later in day with plans to extubate  Respiratory: On full vent support at this time. AC 12/500/50/5. Actively titrating fio2 to maintain spo2 >92%. Goal Plateau pressures <30. Obtain ABG. Will PSV/CPAP 5/5. Obtain chest xray to verify tube placement.    Cardiac: Sinus tachycardia, likely r/t hypovolemia and anxiety, will continue with fluid resuscitation and monitor. Arterial line in place for continuous BP monitoring.   GI: Open approach for primary repair, abdomen open for substantial period of time, concern for ileus. Maintain NPO status at this time, OGT in place ; place to continuous low suction  /Renal: Patel in place. Strict I&O. Monitor and trend electrolytes. May have ATN/EVELYNE from decreased volume or shunting of blood flow during aortic repair. Continue to monitor. Aggressive fluid resuscitation. Will give x1 L of LR. Monitor electrolytes and replace as needed. Needs full set of electrolytes.   ID: D/t retroperitoneal bleed and extensive operation, will place on empiric abx coverage per GYN’s recommendation with Zosyn. Will obtain cultures, adjust abx as growth/sensitivity results.   Hem: CBC q4 hr in critical stage. Transfuse if hgb <7 or if symptomatic. With acute blood loss in OR of unknown amount, retroperitoneal visualized in OR ; monitor for further bleed/expansion. Hold AC given bleed.  Dispo: ICU, Full code Patient is a 30 yo female with a pmh of missed  and ovarian cyst who is being admitted to ICU post operatively from a D&C hysteroscopy and laparoscopic right ovarian cystectomy that was complicated by injury to the aorta resulting in open approach repair. Patient being admitted to ICU with hypovolemic shock, anemia with acute blood loss, retroperitoneal bleed and leukocytosis.   Please see further care in bold. Patient became hypotensive given hemorrhagic shock, MTP initiated, 24 hr total 5 prbc, 1 ffp, 1 platelet.   Plan:  - Hypovolemic shock / Hypotension   - Anemia with acute blood loss  - Retroperitoneal bleed  - Leukocytosis  - Lactic acidosis  - Hypoalbuminemia/Hypocalcemia/Hypomagnesia   Plan:  Neuro: Awakening upon arrival, will sedate with precedex at this time. Will do SAT later in day with plans to extubate. Will change sedation to propofol to ease comfort and anxiety.  Respiratory: On full vent support at this time. AC 12/500/50/5. Actively titrating fio2 to maintain spo2 >92%. Goal Plateau pressures <30. Obtain ABG. Will PSV/CPAP 5/5. Obtain chest xray to verify tube placement.  D/t acute decompensation/hypotension and post MTP, will plan to keep intubated and sedated for 24 hour period to assess hemodynamic stability and verify before extubation.  Cardiac: Sinus tachycardia, likely r/t hypovolemia and anxiety, will continue with fluid resuscitation and monitor. Arterial line in place for continuous BP monitoring. Patient started on levophed given shock, titrate to maintain spo2 >65 for adequate tissue perfusion. Gen surg/CC consulted on patient, given concern of rebleed, maintain sbp 100-110, will add PRN's if needed. Vascular checks q2 hr for 24 hour given aortic repair.   GI: Open approach for primary repair, abdomen open for substantial period of time, concern for ileus. Maintain NPO status at this time, OGT in place ; place to continuous low suction. Must monitor for abd compartment syndrome, bladder pressures q6. obtain first with out paralytic to assess need for prior. CT obtained, RP bleed appears contained with no active enlargement ; official read pending. GYN following case.   /Renal: Patel in place. Strict I&O. Monitor and trend electrolytes. May have ATN/EVELYNE from decreased volume or shunting of blood flow during aortic repair. Continue to monitor. Aggressive fluid resuscitation. Will give x1 L of LR. Monitor electrolytes and replace as needed. Needs full set of electrolytes. Will given 2g Ryan gluconate.  Consider albumin.  ID: D/t retroperitoneal bleed and extensive operation, will place on empiric abx coverage per GYN’s recommendation with Zosyn. Will obtain cultures, adjust abx as growth/sensitivity results.   Hem: CBC q4 hr in critical stage. Transfuse if hgb <7 or if symptomatic. With acute blood loss in OR of unknown amount, retroperitoneal visualized in OR ; monitor for further bleed/expansion. Hold AC given bleed.  Dispo: ICU, Full code

## 2022-02-25 NOTE — CONSULT NOTE ADULT - SUBJECTIVE AND OBJECTIVE BOX
SURGICAL CRITICAL CARE    Surgery: D&C, laparoscopic right ovarian cystectomy c/b intraoperative aortic injury repaired by vascular surgery, now in hemorrhagic shock requiring MTP    Post op day: 0    HPI:  31F with PMHx anxiety, missed , ovarian cyst who was planned for elective D&C and laparoscopic right ovarian cystectomy. Operative course c/b hemorrhagic shock requiring MTP 2/2 aortic injury that was repaired by vascular surgery intraop.    MTP initiated, patient has so far received 5U PRBCs/1FFP/1platelets.   CTA shows RP hematoma but no active extravasation at this point.      Physical Exam  Vital Signs Last 24 Hrs  T(C): 37.2 (2022 12:45), Max: 37.2 (2022 12:45)  T(F): 99 (2022 12:45), Max: 99 (2022 12:45)  HR: 96 (2022 13:50) (74 - 126)  BP: 115/71 (2022 06:36) (115/71 - 115/71)  BP(mean): --  RR: 20 (2022 13:50) (10 - 20)  SpO2: 100% (2022 13:50) (99% - 100%)  Gen: NAD, resting comfortably  HEENT: normocephalic, atraumatic, intubated  CV: tachycardic  Pulm: intubated, mechanically ventilated  Abd: Soft, distended, abdomen not tense. Midline dressing in place, clean/dry/intact.  Ext: warm,     Labs:                        10.5   28.92 )-----------( 298      ( 2022 14:15 )             30.8     02-25    143  |  115<H>  |  9   ----------------------------<  181<H>  4.1   |  20<L>  |  0.88    Ca    7.2<L>      2022 14:15  Phos  4.0     02-  Mg     1.4         TPro  4.2<L>  /  Alb  2.3<L>  /  TBili  0.4  /  DBili  x   /  AST  19  /  ALT  17  /  AlkPhos  39<L>      PT/INR - ( 2022 14:15 )   PT: 14.2 sec;   INR: 1.21 ratio         PTT - ( 2022 14:15 )  PTT:23.5 sec    ABG - ( 2022 09:18 )  pH, Arterial: 7.33  pH, Blood: x     /  pCO2: 39    /  pO2: 351   / HCO3: 21    / Base Excess: -5.3  /  SaO2: 99.2          < from: CT Angio Abdomen and Pelvis w/ IV Cont (22 @ 15:28) >    INTERPRETATION:  CLINICAL INFORMATION: Intraoperative aortic injury with   primary repair following right ovarian cystectomy.    COMPARISON: None.    CONTRAST/COMPLICATIONS:  IV Contrast: Omnipaque 350 (accession [id number]), IV contrast documented   in associated exam (accession [id number])  90 cc administered (accession   [id number]), 0 cc administered (accession [id number])   10 cc discarded   (accession [id number]), 0 cc discarded (accession [id number])  Oral Contrast: NONE  Complications: None reported at time of study completion    PROCEDURE:  CT Angiography of the chest and abdomen was performed followed by portal   venous phase imaging of the Abdomen and Pelvis.  Sagittal and coronal reformats were performed as well as 3D (MIP)   reconstructions.    FINDINGS:    Metallic streak artifact related to patient's spinal fusion hardware   degrades image quality limiting evaluation.    CHEST:    LUNGS AND LARGE AIRWAYS: PLEURA:  Endotracheal tube, tip above the maritza.  The central airways are patent.    Mild dependent bibasilar atelectasis.    VESSELS: No acute pulmonary embolism noted in the main, central right or   left pulmonary arteries.    HEART: Heart size is normal. No pericardial effusion.    MEDIASTINUM AND HERO: No lymphadenopathy.    CHEST WALL AND LOWER NECK: Within normal limits.    ABDOMEN AND PELVIS:    LIVER: Within normal limits.  BILE DUCTS: Normal caliber.  GALLBLADDER: Within normal limits.  SPLEEN: Within normal limits.  PANCREAS: Within normal limits.  ADRENALS: Within normal limits.  KIDNEYS/URETERS:  Left perinephric hemorrhage andstranding.  Left kidney is displaced anteriorly by large left retroperitoneal   hematoma.  No hydronephrosis.    BLADDER: Patel catheter in decompressed bladder.  Air within the perivesical, extraperitoneal soft tissues.  REPRODUCTIVE ORGANS:  Low-density thickening of the endometrium.  Small amount of complex free fluid within the pelvis.    BOWEL: Nasogastric tube within the stomach which is mildly distended.  Colonic fecal retention.  No bowel obstruction.   Appendix normal.  PERITONEUM: Smallamount of complex free fluid/hemorrhage.  Small amount of free intraperitoneal air.    VESSELS:  Surgical clips are present at the infrarenal abdominal aorta.  The aorta is normal in caliber.  No extraluminal extravasation of contrast is noted.  The superior and inferior mesenteric arteries are patent.  The bilateral renal arteries are patent.  The bilateral common iliac arteries are patent.    RETROPERITONEUM/LYMPH NODES:  Large left retroperitoneal hematoma measuring approximately  9 x 7.5 x 15  cm. This displaces the left kidney anteriorly laterally and extends into   the pararenal space superiorly and left psoas musculature inferiorly.    ABDOMINAL WALL: Air within the subcutaneous soft tissues.    BONES:  Posterior spinal fusion T4-L1 with bilateral spinal sergio and pedicle screw   instrumentation.    IMPRESSION:    Large left retroperitoneal hematoma, which is displacing the kidney   anterolaterally.  No extravasation of contrast to suggest active bleeding.    Findings discussed with Dr. Valiente at the time of interpretation on   2022.    Other findings as discussed above.    < end of copied text >

## 2022-02-25 NOTE — BRIEF OPERATIVE NOTE - NSICDXBRIEFPOSTOP_GEN_ALL_CORE_FT
POST-OP DIAGNOSIS:  Right ovarian cyst 25-Feb-2022 13:30:39  Tabby Valiente  S/P aorta repair 25-Feb-2022 13:31:22  Tabby Valiente  
POST-OP DIAGNOSIS:  Injury to abdominal aorta, initial encounter 28-Feb-2022 10:41:47  Claudia Sanchez

## 2022-02-25 NOTE — CONSULT NOTE ADULT - ATTENDING COMMENTS
31F h/o anxiety, ovarian cyst presented to hospital for scheduled elective D&C, hysteroscopy, and laparoscopic R ovarian cystectomy. Intra-op noted to have vascular injury, later found to be a aortic bifurcation with ~2L blood loss. Vascular consulted and primary repair performed with 2 figure-8 sutures with control of bleeding. Reported ~12cm x 6cm left RP hematoma present per OBGYN report. Bowel run by gen surg with no evidence of perforation. Intra-op H/H (pre-op 13.7/41.1) 9.3/27.1 Received 2unit pRBC and 4L crystaloid. UOP ~325cc. Pt taken direct back to ICU, with collective decision with anesthesia to maintain pt intubation post-op. Upon arrival to ICU, pt lightly sedated, awakening to verbal and tactile stimulation. Vitals notable for sinus tach to 110s, normotensive 115/71, performing well on CPAP 5/5/50%. Approx 1hr after arrival to ICU, BP noted to drop to 50-60s/30-40s. FAST+ for small fluid collection in R hepatorenal space. MTP called. Received 3unit pRBC (total 5unit pRBC), 1plt, 1FFP. Emergent CTA chest/abd/pelv with no active extravasation,  9cm x 7.5cm x 15cm left RP hematoma. Surgery critical care also consulted.     Neuro: sedation with propofol and precedex  CV: hemorrhagic shock 2/2 acute blood loss anemia from post-op aortic injury, no active bleeding found on CTA - trend lactate  - now weaned off levophed s/p MTP  - goal SBP < 110, sinus tach reactive  - LE vascular checks q1hr  Pulm: remains intubated for airway protection, if remains stable with no signs of ongoing bleeding will wean to extubate in AM  GI: NPO with NGT to low continuous suction, likely to develop ileus given prolonged open abd time 31F h/o anxiety, ovarian cyst presented to hospital for scheduled elective D&C, hysteroscopy, and laparoscopic R ovarian cystectomy. Intra-op noted to have vascular injury, later found to be a aortic bifurcation with ~2L blood loss. Vascular consulted and primary repair performed with 2 figure-8 sutures with control of bleeding. Reported ~12cm x 6cm left RP hematoma present per OBGYN report. Bowel run by gen surg with no evidence of perforation. Intra-op H/H (pre-op 13.7/41.1) 9.3/27.1 Received 2unit pRBC and 4L crystaloid. UOP ~325cc. Pt taken direct back to ICU, with collective decision with anesthesia to maintain pt intubation post-op. Upon arrival to ICU, pt lightly sedated, awakening to verbal and tactile stimulation. Vitals notable for sinus tach to 110s, normotensive 115/71, performing well on CPAP 5/5/50%. Approx 1hr after arrival to ICU, BP noted to drop to 50-60s/30-40s. FAST+ for small fluid collection in R hepatorenal space. MTP called. Received 3unit pRBC (total 5unit pRBC), 1plt, 1FFP. Emergent CTA chest/abd/pelv with no active extravasation,  9cm x 7.5cm x 15cm left RP hematoma. Surgery critical care also consulted.     POCUS: hyperactive LV, no RV dilation, IVC negligible and fully collapsable; A-lines with no focal consolidations or effusions    Neuro: sedation with propofol and precedex  CV: hemorrhagic shock 2/2 acute blood loss anemia from post-op aortic injury, no active bleeding found on CTA - trend lactate  - now weaned off levophed s/p MTP  - goal SBP < 110, sinus tach reactive  - LE vascular checks q1hr  Pulm: remains intubated for airway protection, if remains stable with no signs of ongoing bleeding will wean to extubate in AM  GI: NPO with NGT to low continuous suction, likely to develop ileus given prolonged open abd time  - protonix ppx  - monitor for compartment syndrome  Renal: monitor strict I/Os, electrolytes, bladder pressures  ID: will start emperic zosyn given risk of infection from RP hematoma  - blood cultures sent  Heme: acute blood loss anemia s/p MTP, received total 5unit pRBC, 1 plt, 1 FFP  - DVT ppx with venodynes 31F h/o anxiety, ovarian cyst presented to hospital for scheduled elective D&C, hysteroscopy, and laparoscopic R ovarian cystectomy. Intra-op noted to have vascular injury, later found to be a aortic bifurcation with ~2L blood loss. Vascular consulted and primary repair performed with 2 figure-8 sutures with control of bleeding. Reported ~12cm x 6cm left RP hematoma present per OBGYN report. Bowel run by gen surg with no evidence of perforation. Intra-op H/H (pre-op 13.7/41.1) 9.3/27.1 Received 2unit pRBC and 4L crystaloid. UOP ~325cc. Pt taken direct back to ICU, with collective decision with anesthesia to maintain pt intubation post-op. Upon arrival to ICU, pt lightly sedated, awakening to verbal and tactile stimulation. Vitals notable for sinus tach to 110s, normotensive 115/71, performing well on CPAP 5/5/50%. Approx 1hr after arrival to ICU, BP noted to drop to 50-60s/30-40s. FAST+ for small fluid collection in R hepatorenal space. MTP called. Received 3unit pRBC (total 5unit pRBC), 1plt, 1FFP. Emergent CTA chest/abd/pelv with no active extravasation,  9cm x 7.5cm x 15cm left RP hematoma. Surgery critical care also consulted.     POCUS: hyperactive LV, no RV dilation, IVC negligible and fully collapsable; A-lines with no focal consolidations or effusions    Neuro: sedation with propofol and precedex  CV: hemorrhagic shock 2/2 acute blood loss anemia from post-op aortic injury, no active bleeding found on CTA - trend lactate  - now weaned off levophed s/p MTP  - goal SBP < 110, sinus tach reactive  - LE vascular checks q1hr  Pulm: remains intubated for airway protection, if remains stable with no signs of ongoing bleeding will wean to extubate in AM  GI: NPO with NGT to low continuous suction, likely to develop ileus given prolonged open abd time  - protonix ppx  - monitor for compartment syndrome  Renal: monitor strict I/Os, electrolytes, bladder pressures  ID: will start emperic zosyn given risk of infection from RP hematoma  - blood cultures sent  Heme: acute blood loss anemia s/p MTP, received total 5unit pRBC, 1 plt, 1 FFP  - DVT ppx with venodynes    D/w Surgery critical care attending, OGBYN, Vascular.  at bedside and fully updated on all events. Full code.

## 2022-02-25 NOTE — BRIEF OPERATIVE NOTE - NSICDXBRIEFPROCEDURE_GEN_ALL_CORE_FT
PROCEDURES:  Hysteroscopy, with dilation and curettage 25-Feb-2022 13:24:47  Tabby Valiente  Cystectomy, open 25-Feb-2022 13:28:30  Tabby Valiente  Chromotubation of both fallopian tubes 25-Feb-2022 13:28:50  Tabby Valiente  Laparotomy 25-Feb-2022 13:29:09  Tabby Valiente  
PROCEDURES:  Open repair of abdominal aorta 28-Feb-2022 10:43:05  Claudia Sanchez  Exploration, aorta 28-Feb-2022 10:43:32  Claudia Sanchez

## 2022-02-26 LAB
ALBUMIN SERPL ELPH-MCNC: 2.6 G/DL — LOW (ref 3.3–5)
ALP SERPL-CCNC: 40 U/L — SIGNIFICANT CHANGE UP (ref 40–120)
ALT FLD-CCNC: 21 U/L — SIGNIFICANT CHANGE UP (ref 12–78)
ANION GAP SERPL CALC-SCNC: 5 MMOL/L — SIGNIFICANT CHANGE UP (ref 5–17)
APTT BLD: 27.2 SEC — LOW (ref 27.5–35.5)
AST SERPL-CCNC: 45 U/L — HIGH (ref 15–37)
BILIRUB SERPL-MCNC: 0.7 MG/DL — SIGNIFICANT CHANGE UP (ref 0.2–1.2)
BUN SERPL-MCNC: 8 MG/DL — SIGNIFICANT CHANGE UP (ref 7–23)
CALCIUM SERPL-MCNC: 7.3 MG/DL — LOW (ref 8.5–10.1)
CHLORIDE SERPL-SCNC: 110 MMOL/L — HIGH (ref 96–108)
CO2 SERPL-SCNC: 26 MMOL/L — SIGNIFICANT CHANGE UP (ref 22–31)
CREAT SERPL-MCNC: 0.67 MG/DL — SIGNIFICANT CHANGE UP (ref 0.5–1.3)
GLUCOSE SERPL-MCNC: 122 MG/DL — HIGH (ref 70–99)
HCT VFR BLD CALC: 30.4 % — LOW (ref 34.5–45)
HCT VFR BLD CALC: 30.8 % — LOW (ref 34.5–45)
HCT VFR BLD CALC: 31.1 % — LOW (ref 34.5–45)
HCT VFR BLD CALC: 32.5 % — LOW (ref 34.5–45)
HCT VFR BLD CALC: 34.6 % — SIGNIFICANT CHANGE UP (ref 34.5–45)
HGB BLD-MCNC: 10.5 G/DL — LOW (ref 11.5–15.5)
HGB BLD-MCNC: 10.6 G/DL — LOW (ref 11.5–15.5)
HGB BLD-MCNC: 10.7 G/DL — LOW (ref 11.5–15.5)
HGB BLD-MCNC: 11.2 G/DL — LOW (ref 11.5–15.5)
HGB BLD-MCNC: 11.8 G/DL — SIGNIFICANT CHANGE UP (ref 11.5–15.5)
INR BLD: 1.33 RATIO — HIGH (ref 0.88–1.16)
LACTATE SERPL-SCNC: 1 MMOL/L — SIGNIFICANT CHANGE UP (ref 0.7–2)
LACTATE SERPL-SCNC: 1.1 MMOL/L — SIGNIFICANT CHANGE UP (ref 0.7–2)
LACTATE SERPL-SCNC: 1.4 MMOL/L — SIGNIFICANT CHANGE UP (ref 0.7–2)
MCHC RBC-ENTMCNC: 29.1 PG — SIGNIFICANT CHANGE UP (ref 27–34)
MCHC RBC-ENTMCNC: 29.4 PG — SIGNIFICANT CHANGE UP (ref 27–34)
MCHC RBC-ENTMCNC: 29.4 PG — SIGNIFICANT CHANGE UP (ref 27–34)
MCHC RBC-ENTMCNC: 29.6 PG — SIGNIFICANT CHANGE UP (ref 27–34)
MCHC RBC-ENTMCNC: 30.1 PG — SIGNIFICANT CHANGE UP (ref 27–34)
MCHC RBC-ENTMCNC: 34.1 GM/DL — SIGNIFICANT CHANGE UP (ref 32–36)
MCHC RBC-ENTMCNC: 34.5 GM/DL — SIGNIFICANT CHANGE UP (ref 32–36)
MCHC RBC-ENTMCNC: 35.2 GM/DL — SIGNIFICANT CHANGE UP (ref 32–36)
MCV RBC AUTO: 85.4 FL — SIGNIFICANT CHANGE UP (ref 80–100)
MCV RBC AUTO: 85.6 FL — SIGNIFICANT CHANGE UP (ref 80–100)
MCV RBC AUTO: 86 FL — SIGNIFICANT CHANGE UP (ref 80–100)
MCV RBC AUTO: 86.1 FL — SIGNIFICANT CHANGE UP (ref 80–100)
MCV RBC AUTO: 86.3 FL — SIGNIFICANT CHANGE UP (ref 80–100)
NRBC # BLD: 0 /100 WBCS — SIGNIFICANT CHANGE UP (ref 0–0)
PLATELET # BLD AUTO: 167 K/UL — SIGNIFICANT CHANGE UP (ref 150–400)
PLATELET # BLD AUTO: 169 K/UL — SIGNIFICANT CHANGE UP (ref 150–400)
PLATELET # BLD AUTO: 169 K/UL — SIGNIFICANT CHANGE UP (ref 150–400)
PLATELET # BLD AUTO: 178 K/UL — SIGNIFICANT CHANGE UP (ref 150–400)
PLATELET # BLD AUTO: 205 K/UL — SIGNIFICANT CHANGE UP (ref 150–400)
POTASSIUM SERPL-MCNC: 3.7 MMOL/L — SIGNIFICANT CHANGE UP (ref 3.5–5.3)
POTASSIUM SERPL-SCNC: 3.7 MMOL/L — SIGNIFICANT CHANGE UP (ref 3.5–5.3)
PROT SERPL-MCNC: 5 G/DL — LOW (ref 6–8.3)
PROTHROM AB SERPL-ACNC: 15.6 SEC — HIGH (ref 10.5–13.4)
RBC # BLD: 3.55 M/UL — LOW (ref 3.8–5.2)
RBC # BLD: 3.57 M/UL — LOW (ref 3.8–5.2)
RBC # BLD: 3.61 M/UL — LOW (ref 3.8–5.2)
RBC # BLD: 3.78 M/UL — LOW (ref 3.8–5.2)
RBC # BLD: 4.05 M/UL — SIGNIFICANT CHANGE UP (ref 3.8–5.2)
RBC # FLD: 13.5 % — SIGNIFICANT CHANGE UP (ref 10.3–14.5)
RBC # FLD: 13.6 % — SIGNIFICANT CHANGE UP (ref 10.3–14.5)
RBC # FLD: 13.7 % — SIGNIFICANT CHANGE UP (ref 10.3–14.5)
SODIUM SERPL-SCNC: 141 MMOL/L — SIGNIFICANT CHANGE UP (ref 135–145)
WBC # BLD: 12.5 K/UL — HIGH (ref 3.8–10.5)
WBC # BLD: 13.4 K/UL — HIGH (ref 3.8–10.5)
WBC # BLD: 13.91 K/UL — HIGH (ref 3.8–10.5)
WBC # BLD: 14.51 K/UL — HIGH (ref 3.8–10.5)
WBC # BLD: 14.62 K/UL — HIGH (ref 3.8–10.5)
WBC # FLD AUTO: 12.5 K/UL — HIGH (ref 3.8–10.5)
WBC # FLD AUTO: 13.4 K/UL — HIGH (ref 3.8–10.5)
WBC # FLD AUTO: 13.91 K/UL — HIGH (ref 3.8–10.5)
WBC # FLD AUTO: 14.51 K/UL — HIGH (ref 3.8–10.5)
WBC # FLD AUTO: 14.62 K/UL — HIGH (ref 3.8–10.5)

## 2022-02-26 PROCEDURE — 99291 CRITICAL CARE FIRST HOUR: CPT

## 2022-02-26 RX ORDER — SODIUM CHLORIDE 9 MG/ML
1000 INJECTION, SOLUTION INTRAVENOUS
Refills: 0 | Status: DISCONTINUED | OUTPATIENT
Start: 2022-02-26 | End: 2022-02-27

## 2022-02-26 RX ORDER — ACETAMINOPHEN 500 MG
1000 TABLET ORAL ONCE
Refills: 0 | Status: COMPLETED | OUTPATIENT
Start: 2022-02-26 | End: 2022-02-26

## 2022-02-26 RX ORDER — HYDROMORPHONE HYDROCHLORIDE 2 MG/ML
1 INJECTION INTRAMUSCULAR; INTRAVENOUS; SUBCUTANEOUS EVERY 6 HOURS
Refills: 0 | Status: DISCONTINUED | OUTPATIENT
Start: 2022-02-26 | End: 2022-03-02

## 2022-02-26 RX ORDER — ONDANSETRON 8 MG/1
4 TABLET, FILM COATED ORAL ONCE
Refills: 0 | Status: COMPLETED | OUTPATIENT
Start: 2022-02-26 | End: 2022-02-26

## 2022-02-26 RX ORDER — BENZOCAINE AND MENTHOL 5; 1 G/100ML; G/100ML
1 LIQUID ORAL ONCE
Refills: 0 | Status: COMPLETED | OUTPATIENT
Start: 2022-02-26 | End: 2022-02-26

## 2022-02-26 RX ORDER — DEXMEDETOMIDINE HYDROCHLORIDE IN 0.9% SODIUM CHLORIDE 4 UG/ML
0.2 INJECTION INTRAVENOUS
Qty: 200 | Refills: 0 | Status: DISCONTINUED | OUTPATIENT
Start: 2022-02-26 | End: 2022-02-26

## 2022-02-26 RX ADMIN — HYDROMORPHONE HYDROCHLORIDE 0.5 MILLIGRAM(S): 2 INJECTION INTRAMUSCULAR; INTRAVENOUS; SUBCUTANEOUS at 12:16

## 2022-02-26 RX ADMIN — HYDROMORPHONE HYDROCHLORIDE 0.5 MILLIGRAM(S): 2 INJECTION INTRAMUSCULAR; INTRAVENOUS; SUBCUTANEOUS at 03:59

## 2022-02-26 RX ADMIN — PIPERACILLIN AND TAZOBACTAM 25 GRAM(S): 4; .5 INJECTION, POWDER, LYOPHILIZED, FOR SOLUTION INTRAVENOUS at 13:04

## 2022-02-26 RX ADMIN — BENZOCAINE AND MENTHOL 1 LOZENGE: 5; 1 LIQUID ORAL at 11:26

## 2022-02-26 RX ADMIN — HYDROMORPHONE HYDROCHLORIDE 1 MILLIGRAM(S): 2 INJECTION INTRAMUSCULAR; INTRAVENOUS; SUBCUTANEOUS at 23:43

## 2022-02-26 RX ADMIN — SODIUM CHLORIDE 100 MILLILITER(S): 9 INJECTION, SOLUTION INTRAVENOUS at 09:12

## 2022-02-26 RX ADMIN — Medication 1000 MILLIGRAM(S): at 11:06

## 2022-02-26 RX ADMIN — DEXMEDETOMIDINE HYDROCHLORIDE IN 0.9% SODIUM CHLORIDE 4.6 MICROGRAM(S)/KG/HR: 4 INJECTION INTRAVENOUS at 07:57

## 2022-02-26 RX ADMIN — SODIUM CHLORIDE 125 MILLILITER(S): 9 INJECTION, SOLUTION INTRAVENOUS at 01:27

## 2022-02-26 RX ADMIN — HYDROMORPHONE HYDROCHLORIDE 0.5 MILLIGRAM(S): 2 INJECTION INTRAMUSCULAR; INTRAVENOUS; SUBCUTANEOUS at 12:01

## 2022-02-26 RX ADMIN — HYDROMORPHONE HYDROCHLORIDE 0.5 MILLIGRAM(S): 2 INJECTION INTRAMUSCULAR; INTRAVENOUS; SUBCUTANEOUS at 04:14

## 2022-02-26 RX ADMIN — Medication 400 MILLIGRAM(S): at 17:00

## 2022-02-26 RX ADMIN — Medication 400 MILLIGRAM(S): at 10:36

## 2022-02-26 RX ADMIN — HYDROMORPHONE HYDROCHLORIDE 1 MILLIGRAM(S): 2 INJECTION INTRAMUSCULAR; INTRAVENOUS; SUBCUTANEOUS at 23:59

## 2022-02-26 RX ADMIN — PIPERACILLIN AND TAZOBACTAM 25 GRAM(S): 4; .5 INJECTION, POWDER, LYOPHILIZED, FOR SOLUTION INTRAVENOUS at 21:42

## 2022-02-26 RX ADMIN — PIPERACILLIN AND TAZOBACTAM 25 GRAM(S): 4; .5 INJECTION, POWDER, LYOPHILIZED, FOR SOLUTION INTRAVENOUS at 05:28

## 2022-02-26 RX ADMIN — PANTOPRAZOLE SODIUM 40 MILLIGRAM(S): 20 TABLET, DELAYED RELEASE ORAL at 11:37

## 2022-02-26 RX ADMIN — Medication 1000 MILLIGRAM(S): at 17:15

## 2022-02-26 RX ADMIN — ONDANSETRON 4 MILLIGRAM(S): 8 TABLET, FILM COATED ORAL at 15:04

## 2022-02-26 RX ADMIN — PROPOFOL 11 MICROGRAM(S)/KG/MIN: 10 INJECTION, EMULSION INTRAVENOUS at 03:07

## 2022-02-26 RX ADMIN — CHLORHEXIDINE GLUCONATE 15 MILLILITER(S): 213 SOLUTION TOPICAL at 05:28

## 2022-02-26 RX ADMIN — CHLORHEXIDINE GLUCONATE 1 APPLICATION(S): 213 SOLUTION TOPICAL at 05:28

## 2022-02-26 NOTE — PROGRESS NOTE ADULT - SUBJECTIVE AND OBJECTIVE BOX
Patient is a 31y old  Female who presents with a chief complaint of elective surgery (2022 15:44)      BRIEF HOSPITAL COURSE:   31F with PMHx anxiety, missed , ovarian cyst who underwent elective D&C, hysterscopy and lap R ovarian cystectomy. OR course complicated by aortic injury with acute blood loss/shock requiring laparotomy and repair of vascular injury. Post op course complicated by hypovolemic/hemorrhagic shock requiring MTP and pressor support. CTA showed no further active    Events last 24 hours: ***    PAST MEDICAL & SURGICAL HISTORY:  Moderate anxiety    Missed   2021    Ectopic pregnancy  2022    History of spinal fusion for scoliosis          Review of Systems:  unable to perform at this time, pt is intubated with sedation      Medications:  piperacillin/tazobactam IVPB.. 3.375 Gram(s) IV Intermittent every 8 hours    hydrALAZINE Injectable 10 milliGRAM(s) IV Push every 6 hours PRN      acetaminophen   IVPB .. 1000 milliGRAM(s) IV Intermittent Once PRN  HYDROmorphone  Injectable 0.5 milliGRAM(s) IV Push every 4 hours PRN  HYDROmorphone  Injectable 1 milliGRAM(s) IV Push Once PRN  ondansetron Injectable 4 milliGRAM(s) IV Push Once PRN  propofol Infusion 20 MICROgram(s)/kG/Min IV Continuous <Continuous>        pantoprazole  Injectable 40 milliGRAM(s) IV Push daily        lactated ringers. 1000 milliLiter(s) IV Continuous <Continuous>      chlorhexidine 0.12% Liquid 15 milliLiter(s) Oral Mucosa every 12 hours  chlorhexidine 2% Cloths 1 Application(s) Topical <User Schedule>        Mode: AC/ CMV (Assist Control/ Continuous Mandatory Ventilation)  RR (machine): 18  TV (machine): 350  FiO2: 30  PEEP: 5  ITime: 1  MAP: 10  PIP: 20      ICU Vital Signs Last 24 Hrs  T(C): 37.9 (2022 00:00), Max: 38.5 (2022 20:00)  T(F): 100.2 (2022 00:00), Max: 101.3 (2022 20:00)  HR: 107 (2022 00:00) (74 - 126)  BP: 112/72 (2022 00:00) (111/65 - 122/75)  BP(mean): 86 (2022 00:00) (80 - 90)  ABP: 91/82 (2022 00:00) (61/49 - 160/57)  ABP(mean): 85 (2022 00:00) (53 - 113)  RR: 23 (2022 00:00) (10 - 34)  SpO2: 98% (2022 00:00) (97% - 100%)      ABG - ( 2022 17:49 )  pH, Arterial: 7.34  pH, Blood: x     /  pCO2: 36    /  pO2: 137   / HCO3: 19    / Base Excess: -6.4  /  SaO2: 99.8                      LABS:                        11.8   14.62 )-----------( 205      ( 2022 00:21 )             34.6     02-25    143  |  115<H>  |  9   ----------------------------<  181<H>  4.1   |  20<L>  |  0.88    Ca    7.2<L>      2022 14:15  Phos  4.0     02-25  Mg     1.4     02-25    TPro  4.2<L>  /  Alb  2.3<L>  /  TBili  0.4  /  DBili  x   /  AST  19  /  ALT  17  /  AlkPhos  39<L>  02-25          CAPILLARY BLOOD GLUCOSE        PT/INR - ( 2022 14:15 )   PT: 14.2 sec;   INR: 1.21 ratio         PTT - ( 2022 14:15 )  PTT:23.5 sec    CULTURES:      Physical Examination:    General: No acute distress.  Alert, oriented, interactive, nonfocal    HEENT: Pupils equal, reactive to light.  Symmetric.    PULM: Clear to auscultation bilaterally, no significant sputum production    CVS: Regular rate and rhythm, no murmurs, rubs, or gallops    ABD: Soft, nondistended, nontender, normoactive bowel sounds, no masses    EXT: No edema, nontender    SKIN: Warm and well perfused, no rashes noted.    RADIOLOGY: ***    CRITICAL CARE TIME SPENT: ***   Patient is a 31y old  Female who presents with a chief complaint of elective surgery (2022 15:44)      BRIEF HOSPITAL COURSE:   31F with PMHx anxiety, missed , ovarian cyst who underwent elective D&C, hysterscopy and lap R ovarian cystectomy. OR course complicated by aortic injury with acute blood loss/shock requiring laparotomy and repair of vascular injury. Transferred to ICU on vent postop. Post op course complicated by hypovolemic/hemorrhagic shock requiring MTP (received 5pRBC/1FFP and 1plt) and pressor support. CTA showed no further active bleeding, large RP hematoma noted.     Events last 24 hours: febrile, off pressors, H/H remain essentially stable, bladder pressures 7-15, sedated on propofol, full vent support, continues to make good urine output.    PAST MEDICAL & SURGICAL HISTORY:  Moderate anxiety    Missed   2021    Ectopic pregnancy  2022    History of spinal fusion for scoliosis          Review of Systems:  unable to perform at this time, pt is intubated with sedation      Medications:  piperacillin/tazobactam IVPB.. 3.375 Gram(s) IV Intermittent every 8 hours    hydrALAZINE Injectable 10 milliGRAM(s) IV Push every 6 hours PRN      acetaminophen   IVPB .. 1000 milliGRAM(s) IV Intermittent Once PRN  HYDROmorphone  Injectable 0.5 milliGRAM(s) IV Push every 4 hours PRN  HYDROmorphone  Injectable 1 milliGRAM(s) IV Push Once PRN  ondansetron Injectable 4 milliGRAM(s) IV Push Once PRN  propofol Infusion 20 MICROgram(s)/kG/Min IV Continuous <Continuous>        pantoprazole  Injectable 40 milliGRAM(s) IV Push daily        lactated ringers. 1000 milliLiter(s) IV Continuous <Continuous>      chlorhexidine 0.12% Liquid 15 milliLiter(s) Oral Mucosa every 12 hours  chlorhexidine 2% Cloths 1 Application(s) Topical <User Schedule>        Mode: AC/ CMV (Assist Control/ Continuous Mandatory Ventilation)  RR (machine): 18  TV (machine): 350  FiO2: 30  PEEP: 5  ITime: 1  MAP: 10  PIP: 20      ICU Vital Signs Last 24 Hrs  T(C): 37.9 (2022 00:00), Max: 38.5 (2022 20:00)  T(F): 100.2 (2022 00:00), Max: 101.3 (2022 20:00)  HR: 107 (2022 00:00) (74 - 126)  BP: 112/72 (2022 00:00) (111/65 - 122/75)  BP(mean): 86 (2022 00:00) (80 - 90)  ABP: 91/82 (2022 00:00) (61/49 - 160/57)  ABP(mean): 85 (2022 00:00) (53 - 113)  RR: 23 (2022 00:00) (10 - 34)  SpO2: 98% (2022 00:00) (97% - 100%)      ABG - ( 2022 17:49 )  pH, Arterial: 7.34  pH, Blood: x     /  pCO2: 36    /  pO2: 137   / HCO3: 19    / Base Excess: -6.4  /  SaO2: 99.8        I&O's Summary    2022 07:01  -  2022 01:04  --------------------------------------------------------  IN: 3236.6 mL / OUT: 1330 mL / NET: 1906.6 mL      LABS:                        11.8   14.62 )-----------( 205      ( 2022 00:21 )             34.6     02-25    143  |  115<H>  |  9   ----------------------------<  181<H>  4.1   |  20<L>  |  0.88    Ca    7.2<L>      2022 14:15  Phos  4.0       Mg     1.4     25    TPro  4.2<L>  /  Alb  2.3<L>  /  TBili  0.4  /  DBili  x   /  AST  19  /  ALT  17  /  AlkPhos  39<L>  25          CAPILLARY BLOOD GLUCOSE        PT/INR - ( 2022 14:15 )   PT: 14.2 sec;   INR: 1.21 ratio         PTT - ( 2022 14:15 )  PTT:23.5 sec    CULTURES: pending      Physical Examination:    General: sedated on full vent support    HEENT: Pupils equal, reactive to light.  Symmetric.    PULM: Course BS bilaterally    CVS: Regular rhythm, ST    ABD: Softly distended, abd binder in place and not taken down, no BS noted    EXT: No edema    SKIN: Warm and well perfused, no rashes noted.    RADIOLOGY:   ACC: [id number] EXAM:  CT ANGIO ABD PELV (W)AW IC                        ACC: [id number] EXAM:  CT ANGIO CHEST PULM ART [facility]                          PROCEDURE DATE:  2022          INTERPRETATION:  CLINICAL INFORMATION: Intraoperative aortic injury with   primary repair following right ovarian cystectomy.    COMPARISON: None.    CONTRAST/COMPLICATIONS:  IV Contrast: Omnipaque 350 (accession [id number]), IV contrast documented   in associated exam (accession [id number])  90 cc administered (accession   [id number]), 0 cc administered (accession [id number])   10 cc discarded   (accession [id number]), 0 cc discarded (accession [id number])  Oral Contrast: NONE  Complications: None reported at time of study completion    PROCEDURE:  CT Angiography of the chest and abdomen was performed followed by portal   venous phase imaging of the Abdomen and Pelvis.  Sagittal and coronal reformats were performed as well as 3D (MIP)   reconstructions.    FINDINGS:    Metallic streak artifact related to patient's spinal fusion hardware   degrades image quality limiting evaluation.    CHEST:    LUNGS AND LARGE AIRWAYS: PLEURA:  Endotracheal tube, tip above the maritza.  The central airways are patent.    Mild dependent bibasilar atelectasis.    VESSELS: No acute pulmonary embolism noted in the main, central right or   left pulmonary arteries.    HEART: Heart size is normal. No pericardial effusion.    MEDIASTINUM AND HERO: No lymphadenopathy.    CHEST WALL AND LOWER NECK: Within normal limits.    ABDOMEN AND PELVIS:    LIVER: Within normal limits.  BILE DUCTS: Normal caliber.  GALLBLADDER: Within normal limits.  SPLEEN: Within normal limits.  PANCREAS: Within normal limits.  ADRENALS: Within normal limits.  KIDNEYS/URETERS:  Left perinephric hemorrhage and stranding.  Left kidney is displaced anteriorly by large left retroperitoneal   hematoma.  No hydronephrosis.    BLADDER: Patel catheter in decompressed bladder.  Air within the perivesical, extraperitoneal soft tissues.  REPRODUCTIVE ORGANS:  Low-density thickening of the endometrium.  Small amount of complex free fluid within the pelvis.    BOWEL: Nasogastric tube within the stomach which is mildly distended.  Colonic fecal retention.  No bowel obstruction.   Appendix normal.  PERITONEUM: Small amount of complex free fluid/hemorrhage.  Small amount of free intraperitoneal air.    VESSELS:  Surgical clips are present at the infrarenal abdominal aorta.  The aorta is normal in caliber.  No extraluminal extravasation of contrast is noted.  The superior and inferior mesenteric arteries are patent.  The bilateral renal arteries are patent.  The bilateral common iliac arteries are patent.    RETROPERITONEUM/LYMPH NODES:  Large left retroperitoneal hematoma measuring approximately  9 x 7.5 x 15   cm. This displaces the left kidney anteriorly laterally and extends into   the pararenal space superiorly and left psoas musculature inferiorly.    ABDOMINAL WALL: Air within the subcutaneous soft tissues.    BONES:  Posterior spinal fusion T4-L1 with bilateral spinal sergio and pedicle screw   instrumentation.    IMPRESSION:    Large left retroperitoneal hematoma, which is displacing the kidney   anterolaterally.  No extravasation of contrast to suggest active bleeding.    Findings discussed with Dr. Valiente at the time of interpretation on   2022.    Other findings as discussed above.    --- End of Report ---            KATIA ROTH MD; Attending Radiologist  This document has been electronically signed. 2022  4:03PM      CRITICAL CARE TIME SPENT: 35 mins assessing presenting problems of acute illness that poses high probability of life threatening deterioration or end organ damage/dysfunction.  Medical decision making inculding Initiating plan of care, reviewing data, reviewing radiology, direct patient bedside evaluation and interpretation of vital signs, any necessary ventilator management ,discussion with multidisciplinary team, all non inclusive of procedures.    Patient is a 31y old  Female who presents with a chief complaint of elective surgery (2022 15:44)      BRIEF HOSPITAL COURSE:   31F with PMHx anxiety, missed , ovarian cyst who underwent elective D&C, hysterscopy and lap R ovarian cystectomy. OR course complicated by aortic injury with acute blood loss/shock requiring laparotomy and repair of vascular injury. Transferred to ICU on vent postop. Post op course complicated by hypovolemic/hemorrhagic shock requiring MTP (received 5pRBC/1FFP and 1plt) and pressor support. CTA showed no further active bleeding, large RP hematoma noted.     Events last 24 hours: febrile, off pressors, H/H remain essentially stable, bladder pressures 7-15, sedated on propofol, full vent support, continues to make good urine output.    PAST MEDICAL & SURGICAL HISTORY:  Moderate anxiety    Missed   2021    Ectopic pregnancy  2022    History of spinal fusion for scoliosis          Review of Systems:  unable to perform at this time, pt is intubated with sedation      Medications:  piperacillin/tazobactam IVPB.. 3.375 Gram(s) IV Intermittent every 8 hours    hydrALAZINE Injectable 10 milliGRAM(s) IV Push every 6 hours PRN      acetaminophen   IVPB .. 1000 milliGRAM(s) IV Intermittent Once PRN  HYDROmorphone  Injectable 0.5 milliGRAM(s) IV Push every 4 hours PRN  HYDROmorphone  Injectable 1 milliGRAM(s) IV Push Once PRN  ondansetron Injectable 4 milliGRAM(s) IV Push Once PRN  propofol Infusion 20 MICROgram(s)/kG/Min IV Continuous <Continuous>        pantoprazole  Injectable 40 milliGRAM(s) IV Push daily        lactated ringers. 1000 milliLiter(s) IV Continuous <Continuous>      chlorhexidine 0.12% Liquid 15 milliLiter(s) Oral Mucosa every 12 hours  chlorhexidine 2% Cloths 1 Application(s) Topical <User Schedule>        Mode: AC/ CMV (Assist Control/ Continuous Mandatory Ventilation)  RR (machine): 18  TV (machine): 350  FiO2: 30  PEEP: 5  ITime: 1  MAP: 10  PIP: 20      ICU Vital Signs Last 24 Hrs  T(C): 37.9 (2022 00:00), Max: 38.5 (2022 20:00)  T(F): 100.2 (2022 00:00), Max: 101.3 (2022 20:00)  HR: 107 (2022 00:00) (74 - 126)  BP: 112/72 (2022 00:00) (111/65 - 122/75)  BP(mean): 86 (2022 00:00) (80 - 90)  ABP: 91/82 (2022 00:00) (61/49 - 160/57)  ABP(mean): 85 (2022 00:00) (53 - 113)  RR: 23 (2022 00:00) (10 - 34)  SpO2: 98% (2022 00:00) (97% - 100%)      ABG - ( 2022 17:49 )  pH, Arterial: 7.34  pH, Blood: x     /  pCO2: 36    /  pO2: 137   / HCO3: 19    / Base Excess: -6.4  /  SaO2: 99.8        I&O's Summary    2022 07:01  -  2022 01:04  --------------------------------------------------------  IN: 3236.6 mL / OUT: 1330 mL / NET: 1906.6 mL      LABS:                        11.8   14.62 )-----------( 205      ( 2022 00:21 )             34.6     02-25    143  |  115<H>  |  9   ----------------------------<  181<H>  4.1   |  20<L>  |  0.88    Ca    7.2<L>      2022 14:15  Phos  4.0       Mg     1.4     25    TPro  4.2<L>  /  Alb  2.3<L>  /  TBili  0.4  /  DBili  x   /  AST  19  /  ALT  17  /  AlkPhos  39<L>  25          CAPILLARY BLOOD GLUCOSE        PT/INR - ( 2022 14:15 )   PT: 14.2 sec;   INR: 1.21 ratio         PTT - ( 2022 14:15 )  PTT:23.5 sec    CULTURES: pending      Physical Examination:    General: sedated on full vent support    HEENT: Pupils equal, reactive to light.  Symmetric.    PULM: Course BS bilaterally    CVS: Regular rhythm, ST    ABD: Softly distended, abd binder in place and not taken down, no BS noted    EXT: No edema, LLE DP palpable, RLE doppler DP, cap refill <2 sec    SKIN: Warm and well perfused, no rashes noted.    RADIOLOGY:   ACC: [id number] EXAM:  CT ANGIO ABD PELV (W)AW IC                        ACC: [id number] EXAM:  CT ANGIO CHEST PULM ART [facility]                          PROCEDURE DATE:  2022          INTERPRETATION:  CLINICAL INFORMATION: Intraoperative aortic injury with   primary repair following right ovarian cystectomy.    COMPARISON: None.    CONTRAST/COMPLICATIONS:  IV Contrast: Omnipaque 350 (accession [id number]), IV contrast documented   in associated exam (accession [id number])  90 cc administered (accession   [id number]), 0 cc administered (accession [id number])   10 cc discarded   (accession [id number]), 0 cc discarded (accession [id number])  Oral Contrast: NONE  Complications: None reported at time of study completion    PROCEDURE:  CT Angiography of the chest and abdomen was performed followed by portal   venous phase imaging of the Abdomen and Pelvis.  Sagittal and coronal reformats were performed as well as 3D (MIP)   reconstructions.    FINDINGS:    Metallic streak artifact related to patient's spinal fusion hardware   degrades image quality limiting evaluation.    CHEST:    LUNGS AND LARGE AIRWAYS: PLEURA:  Endotracheal tube, tip above the maritza.  The central airways are patent.    Mild dependent bibasilar atelectasis.    VESSELS: No acute pulmonary embolism noted in the main, central right or   left pulmonary arteries.    HEART: Heart size is normal. No pericardial effusion.    MEDIASTINUM AND HERO: No lymphadenopathy.    CHEST WALL AND LOWER NECK: Within normal limits.    ABDOMEN AND PELVIS:    LIVER: Within normal limits.  BILE DUCTS: Normal caliber.  GALLBLADDER: Within normal limits.  SPLEEN: Within normal limits.  PANCREAS: Within normal limits.  ADRENALS: Within normal limits.  KIDNEYS/URETERS:  Left perinephric hemorrhage and stranding.  Left kidney is displaced anteriorly by large left retroperitoneal   hematoma.  No hydronephrosis.    BLADDER: Patel catheter in decompressed bladder.  Air within the perivesical, extraperitoneal soft tissues.  REPRODUCTIVE ORGANS:  Low-density thickening of the endometrium.  Small amount of complex free fluid within the pelvis.    BOWEL: Nasogastric tube within the stomach which is mildly distended.  Colonic fecal retention.  No bowel obstruction.   Appendix normal.  PERITONEUM: Small amount of complex free fluid/hemorrhage.  Small amount of free intraperitoneal air.    VESSELS:  Surgical clips are present at the infrarenal abdominal aorta.  The aorta is normal in caliber.  No extraluminal extravasation of contrast is noted.  The superior and inferior mesenteric arteries are patent.  The bilateral renal arteries are patent.  The bilateral common iliac arteries are patent.    RETROPERITONEUM/LYMPH NODES:  Large left retroperitoneal hematoma measuring approximately  9 x 7.5 x 15   cm. This displaces the left kidney anteriorly laterally and extends into   the pararenal space superiorly and left psoas musculature inferiorly.    ABDOMINAL WALL: Air within the subcutaneous soft tissues.    BONES:  Posterior spinal fusion T4-L1 with bilateral spinal sergio and pedicle screw   instrumentation.    IMPRESSION:    Large left retroperitoneal hematoma, which is displacing the kidney   anterolaterally.  No extravasation of contrast to suggest active bleeding.    Findings discussed with Dr. Valiente at the time of interpretation on   2022.    Other findings as discussed above.    --- End of Report ---            KATIA ROTH MD; Attending Radiologist  This document has been electronically signed. 2022  4:03PM      CRITICAL CARE TIME SPENT: 35 mins assessing presenting problems of acute illness that poses high probability of life threatening deterioration or end organ damage/dysfunction.  Medical decision making inculding Initiating plan of care, reviewing data, reviewing radiology, direct patient bedside evaluation and interpretation of vital signs, any necessary ventilator management ,discussion with multidisciplinary team, all non inclusive of procedures.

## 2022-02-26 NOTE — PROGRESS NOTE ADULT - SUBJECTIVE AND OBJECTIVE BOX
INTERVAL HPI/OVERNIGHT EVENTS: Pt seen and examined at bedside.  Pt complains of pain at intubation site. Denies abdominal pain    MEDICATIONS  (STANDING):  acetaminophen   IVPB .. 1000 milliGRAM(s) IV Intermittent once  chlorhexidine 0.12% Liquid 15 milliLiter(s) Oral Mucosa every 12 hours  chlorhexidine 2% Cloths 1 Application(s) Topical <User Schedule>  dexMEDEtomidine Infusion 0.2 MICROgram(s)/kG/Hr (4.6 mL/Hr) IV Continuous <Continuous>  lactated ringers. 1000 milliLiter(s) (100 mL/Hr) IV Continuous <Continuous>  pantoprazole  Injectable 40 milliGRAM(s) IV Push daily  piperacillin/tazobactam IVPB.. 3.375 Gram(s) IV Intermittent every 8 hours    MEDICATIONS  (PRN):  hydrALAZINE Injectable 10 milliGRAM(s) IV Push every 6 hours PRN SBP >140  HYDROmorphone  Injectable 0.5 milliGRAM(s) IV Push every 4 hours PRN Moderate Pain (4 - 6)      Vital Signs Last 24 Hrs  T(C): 37.9 (26 Feb 2022 07:15), Max: 38.5 (25 Feb 2022 20:00)  T(F): 100.2 (26 Feb 2022 07:15), Max: 101.3 (25 Feb 2022 20:00)  HR: 85 (26 Feb 2022 09:30) (85 - 126)  BP: 98/59 (26 Feb 2022 09:30) (98/59 - 122/75)  BP(mean): 74 (26 Feb 2022 09:30) (74 - 90)  RR: 17 (26 Feb 2022 09:30) (10 - 34)  SpO2: 98% (26 Feb 2022 09:30) (97% - 100%)    PHYSICAL EXAM:    GA: NAD, A+0 x 3  Abd: soft, nontender, nondistended, no rebound or guarding,   Incision: clean, dry and intact; steri-strips in place  : no vaginal bleeding noted  Patel: urine output adequate      LABS:                        10.7   12.50 )-----------( 167      ( 26 Feb 2022 08:52 )             30.4     02-26    141  |  110<H>  |  8   ----------------------------<  122<H>  3.7   |  26  |  0.67    Ca    7.3<L>      26 Feb 2022 08:52  Phos  4.0     02-25  Mg     1.4     02-25    TPro  5.0<L>  /  Alb  2.6<L>  /  TBili  0.7  /  DBili  x   /  AST  45<H>  /  ALT  21  /  AlkPhos  40  02-26    PT/INR - ( 26 Feb 2022 08:52 )   PT: 15.6 sec;   INR: 1.33 ratio         PTT - ( 26 Feb 2022 08:52 )  PTT:27.2 sec      RADIOLOGY & ADDITIONAL TESTS:    ACC: [id number] EXAM:  CT ANGIO ABD PELV (W)AW IC                        ACC: [id number] EXAM:  CT ANGIO CHEST PULM ART [facility]                        PROCEDURE DATE:  02/25/2022      INTERPRETATION:  CLINICAL INFORMATION: Intraoperative aortic injury with   primary repair following right ovarian cystectomy.    COMPARISON: None.    CONTRAST/COMPLICATIONS:  IV Contrast: Omnipaque 350 (accession [id number]), IV contrast documented   in associated exam (accession [id number])  90 cc administered (accession   [id number]), 0 cc administered (accession [id number])   10 cc discarded   (accession [id number]), 0 cc discarded (accession [id number])  Oral Contrast: NONE  Complications: None reported at time of study completion    PROCEDURE:  CT Angiography of the chest and abdomen was performed followed by portal   venous phase imaging of the Abdomen and Pelvis.  Sagittal and coronal reformats were performed as well as 3D (MIP)   reconstructions.    FINDINGS:    Metallic streak artifact related to patient's spinal fusion hardware   degrades image quality limiting evaluation.    CHEST:    LUNGS AND LARGE AIRWAYS: PLEURA:  Endotracheal tube, tip above the maritza.  The central airways are patent.    Mild dependent bibasilar atelectasis.    VESSELS: No acute pulmonary embolism noted in the main, central right or   left pulmonary arteries.    HEART: Heart size is normal. No pericardial effusion.    MEDIASTINUM AND HERO: No lymphadenopathy.    CHEST WALL AND LOWER NECK: Within normal limits.    ABDOMEN AND PELVIS:    LIVER: Within normal limits.  BILE DUCTS: Normal caliber.  GALLBLADDER: Within normal limits.  SPLEEN: Within normal limits.  PANCREAS: Within normal limits.  ADRENALS: Within normal limits.  KIDNEYS/URETERS:  Left perinephric hemorrhage andstranding.  Left kidney is displaced anteriorly by large left retroperitoneal   hematoma.  No hydronephrosis.    BLADDER: Paetl catheter in decompressed bladder.  Air within the perivesical, extraperitoneal soft tissues.  REPRODUCTIVE ORGANS:  Low-density thickening of the endometrium.  Small amount of complex free fluid within the pelvis.    BOWEL: Nasogastric tube within the stomach which is mildly distended.  Colonic fecal retention.  No bowel obstruction.   Appendix normal.  PERITONEUM: Smallamount of complex free fluid/hemorrhage.  Small amount of free intraperitoneal air.    VESSELS:  Surgical clips are present at the infrarenal abdominal aorta.  The aorta is normal in caliber.  No extraluminal extravasation of contrast is noted.  The superior and inferior mesenteric arteries are patent.  The bilateral renal arteries are patent.  The bilateral common iliac arteries are patent.    RETROPERITONEUM/LYMPH NODES:  Large left retroperitoneal hematoma measuring approximately  9 x 7.5 x 15  cm. This displaces the left kidney anteriorly laterally and extends into   the pararenal space superiorly and left psoas musculature inferiorly.    ABDOMINAL WALL: Air within the subcutaneous soft tissues.    BONES:  Posterior spinal fusion T4-L1 with bilateral spinal sergio and pedicle screw   instrumentation.    IMPRESSION:    Large left retroperitoneal hematoma, which is displacing the kidney   anterolaterally.  No extravasation of contrast to suggest active bleeding.    KATIA ROTH MD; Attending Radiologist

## 2022-02-26 NOTE — PROGRESS NOTE ADULT - ATTENDING COMMENTS
31F h/o anxiety, ovarian cyst presented to hospital 2/25 for scheduled elective D&C, hysteroscopy, and laparoscopic R ovarian cystectomy. Intra-op noted to have vascular injury, later found to be a aortic bifurcation with ~2L blood loss. Vascular consulted and primary repair performed with 2 figure-8 sutures with control of bleeding. Reported ~12cm x 6cm left RP hematoma present per OBGYN report (2 PRBCs intraop). Bowel run by gen surg with no evidence of perforation.  Postop developed hemorrhagic/hypovolemic shock requring MTP, with total requirement 7 PRBC, 1 FFP, 1 platelet.  Emergent CTA showed no contrast extravasation.  Pt stabilized after aggressive resuscitation.      Neuro: propofol changed to precedex to facilitate vent weaning  IV tylenol and dilaudid for pain control  CV: shock resolved after aggressive resuscitation, lactate normalized  decrease IVF to 100cc/h  Pulm: passed PS 5/5 and successfully extubated this am  GI: NPO with NGT to low continuous suction, likely to develop ileus given prolonged open abd time  - protonix ppx  - no signs of abdominal compartment syndrome  Renal: normal renal function, excellent UOP  ID: will start emperic zosyn given risk of infection from RP hematoma  Heme: acute blood loss anemia s/p MTP, received total 7unit pRBC, 1 plt, 1 FFP  - trend CBC q4-6h  - DVT ppx with venodynes    Discussed with Dr. Valiente, pt and  31F h/o anxiety, ovarian cyst presented to hospital 2/25 for scheduled elective D&C, hysteroscopy, and laparoscopic R ovarian cystectomy. Intra-op noted to have vascular injury, later found to be a aortic bifurcation with ~2L blood loss. Vascular consulted and primary repair performed with 2 figure-8 sutures with control of bleeding. Reported ~12cm x 6cm left RP hematoma present per OBGYN report (2 PRBCs intraop). Bowel run by gen surg with no evidence of perforation.  Postop developed hemorrhagic/hypovolemic shock requring MTP, with total requirement 5 PRBC, 1 FFP, 1 platelet.  Emergent CTA showed no contrast extravasation.  Pt stabilized after aggressive resuscitation.      Neuro: propofol changed to precedex to facilitate vent weaning  IV tylenol and dilaudid for pain control  CV: shock resolved after aggressive resuscitation, lactate normalized  decrease IVF to 100cc/h  Pulm: passed PS 5/5 and successfully extubated this am  GI: NPO with NGT to low continuous suction, likely to develop ileus given prolonged open abd time  - protonix ppx  - no signs of abdominal compartment syndrome  Renal: normal renal function, excellent UOP  ID: will start emperic zosyn given risk of infection from RP hematoma  Heme: acute blood loss anemia s/p MTP, received total 5unit pRBC, 1 plt, 1 FFP  - trend CBC q4-6h  - DVT ppx with venodynes    Discussed with Dr. Valiente, pt and

## 2022-02-26 NOTE — PROGRESS NOTE ADULT - ASSESSMENT
30 y/o POD #1 s/p D&C Hysteroscopy, right ovarian cystectomy, chromotubation, Laparotomy with aortic repair and MTP postop, now extubated     PLAN:  Continue care as per ICU team  Trend H/H- transfuse PRN  No indication for further vascular surgery intervention at this time. Signing off from vascular standpoint, recall as needed.   Discussed with Dr. Sarabia

## 2022-02-26 NOTE — PROGRESS NOTE ADULT - ASSESSMENT
32 yo POD #1 s/p D&C Hysteroscopy, right ovarian cystectomy, chromotubation, Laparotomy with aortic repair and MTP postop   Patient denies any abdominal pain   vital signs stable   remains afebrile this morning   For trial of extubation   Continue broad spectrum antibiotics - zosyn  Continue serial cbcs   leukocytosis downtrending   lactate normal   pain control   npo diet   DVT prophylaxis   Appreciate surgery recs

## 2022-02-26 NOTE — PROGRESS NOTE ADULT - ASSESSMENT
Impression:  1. hypovolemic shock now resolved  2. acute blood loss anemia  3. acute respiratory failure, unspecified hypoxia/hypercapnia  4. retroperitoneal bleed/hematoma  5. sp hysteroscopy/D&C, right ovarian cystectomy complicated by aortic injury with acute blood loss requiring lapartomy and repair of aortic injury.    Plan:  Neuro - Sedation neuromuscular blockade to facilitate safe ventilation    CV -  Pressor support as needed to maintain MAP 65           Avoiding fluid challenges          QTC monitoring while on Azithromycin and Hydroxychloroquine.    Pulm -  ARDS-NET 4-6cc/kg IBW TV as able to maintain plateau pressures <30               Prone ventilation consideration as feasible  Pa02/Fi02 < 150 on Fi02 >60% and PEEP at least 5                 Vent bundle Reviewed     GI -  PPI  Enteric feeds as tolerated in tandem with NMB and prone ventilation    Renal - Even to negative fluid balance as tolerated by hemodynamics and renal fx.  Feeds to be provided in lieu of IVF.     Heme -  Pharmacologic DVT PPx  in addition to SCD's    ID - ABX discontinuation based on discussion with ID in conjunction with clinical features, culture data, and judicious procalcitonin monitoring.      Endo -  Aggressive glycemic control to limit FS glucose to < 180mg/dl.       Impression:  1. hypovolemic/hemorrhagic shock now resolved  2. acute blood loss anemia  3. acute respiratory failure, unspecified hypoxia/hypercapnia  4. retroperitoneal bleed/hematoma  5. metabolic acidosis  6. sp hysteroscopy/D&C, right ovarian cystectomy complicated by aortic injury with acute blood loss requiring lapartomy and repair of aortic injury.    Plan:  Neuro - Sedation to facilitate safe ventilation overnight, SAT in am    CV -  off pressors currently, pressor support as needed to maintain MAP>65           goal to keep SBP<110 with maintenance of MAP>65           lactate continues to downtrend, repeat pending    Pulm -  full vent support with LTV 6-8cc/kg IDW, keeping plateau pressures <30             actively titrating FiO2 to keep sats>90%             utilization of PEEP for alveolar recruitment if desats             Vent bundle in place and reviewed              plan for SBT in am pending SAT trial    GI -  PPI, NPO, NGT, cont bladder pressure monitoring, have remained 7-15, not paralyzed, abd binder loosed by surgical team, will assess repeat, notification of surgical team if >20, currently with          resolving acidosis and cont adequate urine output not consistent with compartment syndrome, wound care as per surgical team    Renal - Cr stable, strict I/Os, IV hydration with balanced fluids, BMP in am     Heme -  no pharmacologic DVT PPx for now in face of acute bleeding, Sx to determine timing of chem DVT ppx, SCDs for now, H/H remain fairly stable x 3, transfuse for Hbg<7 or signs of active                bleeding    ID - empiric IV abx for now given RP hematoma, BCx pending, Abx discontinuation based on clinical features and culture data.    Endo -  Aggressive glycemic control to limit FS glucose to < 180mg/dl, stable.       Impression:  1. hypovolemic/hemorrhagic shock now resolved  2. acute blood loss anemia  3. acute respiratory failure, unspecified hypoxia/hypercapnia  4. retroperitoneal hematoma secondary to aortic injury  5. metabolic acidosis  6. sp hysteroscopy/D&C, right ovarian cystectomy complicated by aortic injury with acute blood loss requiring lapartomy and repair of aortic injury.    Plan:  Neuro - Sedation to facilitate safe ventilation overnight, SAT in am    CV -  off pressors currently, pressor support as needed to maintain MAP>65           goal to keep SBP<110 with maintenance of MAP>65           lactate continues to downtrend, repeat pending    Pulm -  full vent support with LTV 6-8cc/kg IDW, keeping plateau pressures <30             actively titrating FiO2 to keep sats>90%             utilization of PEEP for alveolar recruitment if desats             Vent bundle in place and reviewed              plan for SBT in am pending SAT trial    GI -  PPI, NPO, NGT, cont bladder pressure monitoring, have remained 7-15, not paralyzed, abd binder loosed by surgical team, will assess repeat, notification of surgical team if >20, currently with          resolving acidosis and cont adequate urine output not consistent with compartment syndrome, wound care as per surgical team    Renal - Cr stable, strict I/Os, IV hydration with balanced fluids, BMP in am     Heme -  no pharmacologic DVT PPx for now in face of acute bleeding, Sx to determine timing of chem DVT ppx, SCDs for now, H/H remain fairly stable x 3, transfuse for Hbg<7 or signs of active                bleeding    ID - empiric IV abx for now given RP hematoma, BCx pending, Abx discontinuation based on clinical features and culture data.    Endo -  Aggressive glycemic control to limit FS glucose to < 180mg/dl, stable.

## 2022-02-26 NOTE — DIETITIAN INITIAL EVALUATION ADULT. - OTHER INFO
GI/Intake:  -NPO status; initiation of EN provision not warranted at this time   -NGT to suction; 50cc out (2/25)   -Resolving acidosis     Resp:   -Intubated (2/25)  -Extubated this AM (2/26)     Heme:   -Hemorrhagic shock 2/2 acute blood loss anemia; MTP needed    Renal:  -Lactated ringers ordered for hydration

## 2022-02-26 NOTE — PROGRESS NOTE ADULT - SUBJECTIVE AND OBJECTIVE BOX
The patient was evaluated. Remains intubated, on Precedex infusion.  Arousable and responsive.  VSS, not on pressors.  Plan to extubated after Gyn eval per ICU staff.    31y Female    T(C): 37.9 (02-26-22 @ 07:15), Max: 38.5 (02-25-22 @ 20:00)  HR: 85 (02-26-22 @ 09:30) (85 - 126)  BP: 98/59 (02-26-22 @ 09:30) (98/59 - 122/75)  RR: 17 (02-26-22 @ 09:30) (10 - 34)  SpO2: 98% (02-26-22 @ 09:30) (97% - 100%)    No apparent anesthesia related sequelae.

## 2022-02-26 NOTE — PROGRESS NOTE ADULT - ASSESSMENT
Patient is a 30 yo female with a pmh of missed  and ovarian cyst who is being admitted to ICU post operatively from a D&C hysteroscopy and laparoscopic right ovarian cystectomy that was complicated by injury to the aorta resulting in open approach repair. Patient being admitted to ICU with hypovolemic shock, anemia with acute blood loss, retroperitoneal bleed and leukocytosis.   Please see further care in bold. Patient became hypotensive given hemorrhagic shock, MTP initiated, 24 hr total 5 prbc, 1 ffp, 1 platelet.   Plan:  - Hypovolemic shock / Hypotension   - Anemia with acute blood loss  - Retroperitoneal bleed  - Leukocytosis  - Lactic acidosis  - Hypoalbuminemia/Hypocalcemia/Hypomagnesia   Plan:  Neuro: Awakening upon arrival, will sedate with precedex at this time. Will do SAT later in day with plans to extubate. Will change sedation to propofol to ease comfort and anxiety.  Respiratory: On full vent support at this time. AC 12/500/50/5. Actively titrating fio2 to maintain spo2 >92%. Goal Plateau pressures <30. Obtain ABG. Will PSV/CPAP 5/5. Obtain chest xray to verify tube placement.  D/t acute decompensation/hypotension and post MTP, will plan to keep intubated and sedated for 24 hour period to assess hemodynamic stability and verify before extubation.  Cardiac: Sinus tachycardia, likely r/t hypovolemia and anxiety, will continue with fluid resuscitation and monitor. Arterial line in place for continuous BP monitoring. Patient started on levophed given shock, titrate to maintain spo2 >65 for adequate tissue perfusion. Gen surg/CC consulted on patient, given concern of rebleed, maintain sbp 100-110, will add PRN's if needed. Vascular checks q2 hr for 24 hour given aortic repair.   GI: Open approach for primary repair, abdomen open for substantial period of time, concern for ileus. Maintain NPO status at this time, OGT in place ; place to continuous low suction. Must monitor for abd compartment syndrome, bladder pressures q6. obtain first with out paralytic to assess need for prior. CT obtained, RP bleed appears contained with no active enlargement ; official read pending. GYN following case.   /Renal: Patel in place. Strict I&O. Monitor and trend electrolytes. May have ATN/EVELYNE from decreased volume or shunting of blood flow during aortic repair. Continue to monitor. Aggressive fluid resuscitation. Will give x1 L of LR. Monitor electrolytes and replace as needed. Needs full set of electrolytes. Will given 2g Ryan gluconate.  Consider albumin.  ID: D/t retroperitoneal bleed and extensive operation, will place on empiric abx coverage per GYN’s recommendation with Zosyn. Will obtain cultures, adjust abx as growth/sensitivity results.   Hem: CBC q4 hr in critical stage. Transfuse if hgb <7 or if symptomatic. With acute blood loss in OR of unknown amount, retroperitoneal visualized in OR ; monitor for further bleed/expansion. Hold AC given bleed.  Dispo: ICU, Full code Patient is a 30 yo female with a pmh of missed  and ovarian cyst who is being admitted to ICU post operatively from a D&C hysteroscopy and laparoscopic right ovarian cystectomy that was complicated by injury to the aorta resulting in open approach repair. Patient being admitted to ICU with hypovolemic shock, anemia with acute blood loss, retroperitoneal bleed and leukocytosis. Patient became hypotensive given hemorrhagic shock, MTP initiated, 24 hr total 5 prbc, 1 ffp, 1 platelet.  Plan:  Neuro:   - off sedation  - not paralyzed  - awake and intubated with plans to extubate  CV  - HD stable off pressure support   - lactate 1.1  - appears hypovolemic shock resolved  Respiratory  - on full vent support with plans to extubate today  - Actively titrating fio2 to maintain spo2 >92%  - AC /  GI  - open approach for primary repair, abdomen open for substantial period of time, concern for ileus  - NPO, NGT  - monitor for abd compartment syndrome  - bladder pressure 13 mm hg this AM  - CT abd: RP bleed appears to be contained, will order repeat CT for today  - GYN following case    - evans   - monitor electrolytes  - good urine output  - decrease LR to 100 cc/hr  - low phos this AM, repleated  Heme  - h/h appears to be trending down slowly  - f/u repeat h/h 1 pm  - Transfuse if hgb <7 or if symptomatic.  - no DVT prophylaxis  ID  - D/t retroperitoneal bleed and extensive operation  - empiric abx coverage per GYN’s recommendation  - started on zosyn  - BC pending  Lines  - A line and evans catheter inserted

## 2022-02-26 NOTE — PROGRESS NOTE ADULT - SUBJECTIVE AND OBJECTIVE BOX
KELTON MCKAY  MRN-[id number] 31y    GYN SURGERY POST OP CHECK / DR. TAVERAS  VASCULAR SURGERY POST OP CHECK / DR. NATALIO AMOR     MEDICATIONS  (STANDING):  chlorhexidine 0.12% Liquid 15 milliLiter(s) Oral Mucosa every 12 hours  chlorhexidine 2% Cloths 1 Application(s) Topical <User Schedule>  lactated ringers. 1000 milliLiter(s) (125 mL/Hr) IV Continuous <Continuous>  pantoprazole  Injectable 40 milliGRAM(s) IV Push daily  piperacillin/tazobactam IVPB.. 3.375 Gram(s) IV Intermittent every 8 hours  propofol Infusion 20 MICROgram(s)/kG/Min (11 mL/Hr) IV Continuous <Continuous>    MEDICATIONS  (PRN):  acetaminophen   IVPB .. 1000 milliGRAM(s) IV Intermittent Once PRN Mild Pain (1 - 3)  hydrALAZINE Injectable 10 milliGRAM(s) IV Push every 6 hours PRN SBP >140  HYDROmorphone  Injectable 0.5 milliGRAM(s) IV Push every 4 hours PRN Moderate Pain (4 - 6)  HYDROmorphone  Injectable 1 milliGRAM(s) IV Push Once PRN Severe Pain (7 - 10)  ondansetron Injectable 4 milliGRAM(s) IV Push Once PRN Nausea and/or Vomiting     Vital Signs Last 24 Hrs  T(C): 37.9 (26 Feb 2022 00:00), Max: 38.5 (25 Feb 2022 20:00)  T(F): 100.2 (26 Feb 2022 00:00), Max: 101.3 (25 Feb 2022 20:00)  HR: 107 (26 Feb 2022 00:00) (74 - 126)  BP: 112/72 (26 Feb 2022 00:00) (111/65 - 122/75)  BP(mean): 86 (26 Feb 2022 00:00) (80 - 90)  RR: 23 (26 Feb 2022 00:00) (10 - 34)  SpO2: 98% (26 Feb 2022 00:00) (97% - 100%)    02-25-22 @ 07:01  -  02-26-22 @ 00:11  --------------------------------------------------------  IN: 3089.5 mL / OUT: 1270 mL / NET: 1819.5 mL    NGT              50 ML   JUNIOR CATH  1220 CLEAR URINE     SEDATED, INTUBATED     LUNGS: CLEAR TO AUSCULTATION , NO W/R/R  ABDOMEN: MIDLINE AND PFANNENSTIEL INCISION AND RLQ TROCAR SITE  DRY AND INTACT. + BS, DISTENDED, SOME INCISIONAL TENDERNESS   EXTREMITY: NO EDEMA                        12.6   16.89 )-----------( 215      ( 25 Feb 2022 20:31 )    S/P 5 UNITS PRBC,  1 FFP, I PLT              36.2     (02.25.22 @ 17:00)   emoglobin: 13.0 g/dL   Hematocrit: 37.6 %      02-25    143  |  115<H>  |  9   ----------------------------<  181<H>  4.1   |  20<L>  |  0.88    Ca    7.2<L>      25 Feb 2022 14:15  Phos  4.0     02-25  Mg     1.4     02-25    TPro  4.2<L>  /  Alb  2.3<L>  /  TBili  0.4  /  DBili  x   /  AST  19  /  ALT  17  /  AlkPhos  39<L>  02-25    Lactate, Blood: 2.4 (02.25.22 @ 20:31    Lactate, Blood: 4.7 mmol/L (02.25.22 @ 17:02)        ACC: [id number] EXAM:  CT ANGIO ABD PELV (W)AW IC                        ACC: [id number] EXAM:  CT ANGIO CHEST PULM ART [facility]                        PROCEDURE DATE:  02/25/2022      INTERPRETATION:  CLINICAL INFORMATION: Intraoperative aortic injury with   primary repair following right ovarian cystectomy.    COMPARISON: None.    CONTRAST/COMPLICATIONS:  IV Contrast: Omnipaque 350 (accession [id number]), IV contrast documented   in associated exam (accession [id number])  90 cc administered (accession   [id number]), 0 cc administered (accession [id number])   10 cc discarded   (accession [id number]), 0 cc discarded (accession [id number])  Oral Contrast: NONE  Complications: None reported at time of study completion    PROCEDURE:  CT Angiography of the chest and abdomen was performed followed by portal   venous phase imaging of the Abdomen and Pelvis.  Sagittal and coronal reformats were performed as well as 3D (MIP)   reconstructions.    FINDINGS:    Metallic streak artifact related to patient's spinal fusion hardware   degrades image quality limiting evaluation.    CHEST:    LUNGS AND LARGE AIRWAYS: PLEURA:  Endotracheal tube, tip above the maritza.  The central airways are patent.    Mild dependent bibasilar atelectasis.    VESSELS: No acute pulmonary embolism noted in the main, central right or   left pulmonary arteries.    HEART: Heart size is normal. No pericardial effusion.    MEDIASTINUM AND HERO: No lymphadenopathy.    CHEST WALL AND LOWER NECK: Within normal limits.    ABDOMEN AND PELVIS:    LIVER: Within normal limits.  BILE DUCTS: Normal caliber.  GALLBLADDER: Within normal limits.  SPLEEN: Within normal limits.  PANCREAS: Within normal limits.  ADRENALS: Within normal limits.  KIDNEYS/URETERS:  Left perinephric hemorrhage andstranding.  Left kidney is displaced anteriorly by large left retroperitoneal   hematoma.  No hydronephrosis.    BLADDER: Junior catheter in decompressed bladder.  Air within the perivesical, extraperitoneal soft tissues.  REPRODUCTIVE ORGANS:  Low-density thickening of the endometrium.  Small amount of complex free fluid within the pelvis.    BOWEL: Nasogastric tube within the stomach which is mildly distended.  Colonic fecal retention.  No bowel obstruction.   Appendix normal.  PERITONEUM: Smallamount of complex free fluid/hemorrhage.  Small amount of free intraperitoneal air.    VESSELS:  Surgical clips are present at the infrarenal abdominal aorta.  The aorta is normal in caliber.  No extraluminal extravasation of contrast is noted.  The superior and inferior mesenteric arteries are patent.  The bilateral renal arteries are patent.  The bilateral common iliac arteries are patent.    RETROPERITONEUM/LYMPH NODES:  Large left retroperitoneal hematoma measuring approximately  9 x 7.5 x 15  cm. This displaces the left kidney anteriorly laterally and extends into   the pararenal space superiorly and left psoas musculature inferiorly.    ABDOMINAL WALL: Air within the subcutaneous soft tissues.    BONES:  Posterior spinal fusion T4-L1 with bilateral spinal sergio and pedicle screw   instrumentation.    IMPRESSION:    Large left retroperitoneal hematoma, which is displacing the kidney   anterolaterally.  No extravasation of contrast to suggest active bleeding.    KATIA ROTH MD; Attending Radiologist        ASSESSMENT &  PLAN:     S/P HYSTEROSCOPY, D& C  RIGHT OVARIAN CYSTECTOMY  LAPAROTOMY   S/P REPAIR AORTIC INJURY, S/P 5 UNITS PRBC,  1 FFP, I PLT     LARGE LEFT RETROPERITONEAL HEMATOMA    NPO  MAINTAIN NGT  MAINTAIN JUNIOR CATH   MONITOR H/H    ABDOMINAL BINDER LOOSENED    ICU AND SURGICAL CRITICAL CARE CONSULT NOTED  SURGICAL TEAM WILL FOLLOW UP

## 2022-02-26 NOTE — DIETITIAN INITIAL EVALUATION ADULT. - CHIEF COMPLAINT
32yo Female with PMH of ectopic pregnancy, anxiety, missed . Pt s/p Laparoscopic right ovarian cystectomy c/b intraoperative aortic injury resulting ing hemorrhagic shock needing MTP.

## 2022-02-26 NOTE — PROGRESS NOTE ADULT - SUBJECTIVE AND OBJECTIVE BOX
Patient is a 31y old  Female who presents with a chief complaint of elective surgery (26 Feb 2022 11:04)    24 hour events: ***    REVIEW OF SYSTEMS  Constitutional: No fever, chills, fatigue  Neuro: No headache, numbness, weakness  Resp: No cough, wheezing, shortness of breath  CVS: No chest pain, palpitations, leg swelling  GI: No abdominal pain, nausea, vomiting, diarrhea   : No dysuria, frequency, incontinence  Skin: No itching, burning, rashes, or lesions   Msk: No joint pain or swelling  Psych: No depression, anxiety, mood swings  Heme: No bleeding    T(F): 99.8 (02-26-22 @ 10:00), Max: 101.3 (02-25-22 @ 20:00)  HR: 88 (02-26-22 @ 11:30) (85 - 126)  BP: 99/60 (02-26-22 @ 11:30) (98/59 - 122/75)  RR: 20 (02-26-22 @ 11:30) (10 - 34)  SpO2: 94% (02-26-22 @ 11:30) (94% - 100%)  Wt(kg): --    Mode: CPAP with PS, FiO2: 30, PEEP: 5, PS: 5        I&O's Summary    02-25 @ 07:01  -  02-26 @ 07:00  --------------------------------------------------------  IN: 4094.2 mL / OUT: 1765 mL / NET: 2329.2 mL    02-26 @ 07:01  -  02-26 @ 11:43  --------------------------------------------------------  IN: 534.2 mL / OUT: 200 mL / NET: 334.2 mL      PHYSICAL EXAM  General:   CNS:   HEENT:   Resp:   CVS:   Abd:   Ext:   Skin:     MEDICATIONS  piperacillin/tazobactam IVPB.. IV Intermittent    hydrALAZINE Injectable IV Push PRN        dexMEDEtomidine Infusion IV Continuous  HYDROmorphone  Injectable IV Push PRN        pantoprazole  Injectable IV Push      lactated ringers. IV Continuous      chlorhexidine 2% Cloths Topical                            10.7   12.50 )-----------( 167      ( 26 Feb 2022 08:52 )             30.4     Bands 3.0    02-26    141  |  110<H>  |  8   ----------------------------<  122<H>  3.7   |  26  |  0.67    Ca    7.3<L>      26 Feb 2022 08:52  Phos  4.0     02-25  Mg     1.4     02-25    TPro  5.0<L>  /  Alb  2.6<L>  /  TBili  0.7  /  DBili  x   /  AST  45<H>  /  ALT  21  /  AlkPhos  40  02-26    Lactate 1.0           02-26 @ 08:52    Lactate 1.1           02-26 @ 05:35    Lactate 1.4           02-26 @ 00:55    Lactate 2.4           02-25 @ 20:31    Lactate 4.7           02-25 @ 17:02    Lactate 6.0           02-25 @ 14:15          PT/INR - ( 26 Feb 2022 08:52 )   PT: 15.6 sec;   INR: 1.33 ratio         PTT - ( 26 Feb 2022 08:52 )  PTT:27.2 sec          Radiology: ***  Bedside lung ultrasound: ***  Bedside ECHO: ***    CENTRAL LINE: Y/N          DATE INSERTED:              REMOVE: Y/N  JUNIOR: Y/N                        DATE INSERTED:              REMOVE: Y/N  A-LINE: Y/N                       DATE INSERTED:              REMOVE: Y/N    GLOBAL ISSUE/BEST PRACTICE  Analgesia:   Sedation:   CAM-ICU:   HOB elevation: yes  Stress ulcer prophylaxis:   VTE prophylaxis:   Glycemic control:   Nutrition:     CODE STATUS: ***  [facility] discussion: Y       Patient is a 31y old  Female who presents with a chief complaint of elective surgery (26 Feb 2022 11:04)    24 hour events: patient febrile overnight, sedated with propofol, taken off pressors, given 5 units pRBC, 1 FFP and 1 platelet since OR    REVIEW OF SYSTEMS  limited as patient intubated   Gen: +sore throat  GI: +abdominal pain       T(F): 99.8 (02-26-22 @ 10:00), Max: 101.3 (02-25-22 @ 20:00)  HR: 88 (02-26-22 @ 11:30) (85 - 126)  BP: 99/60 (02-26-22 @ 11:30) (98/59 - 122/75)  RR: 20 (02-26-22 @ 11:30) (10 - 34)  SpO2: 94% (02-26-22 @ 11:30) (94% - 100%)  Wt(kg): --    Mode: CPAP with PS, FiO2: 30, PEEP: 5, PS: 5        I&O's Summary    02-25 @ 07:01  -  02-26 @ 07:00  --------------------------------------------------------  IN: 4094.2 mL / OUT: 1765 mL / NET: 2329.2 mL    02-26 @ 07:01  -  02-26 @ 11:43  --------------------------------------------------------  IN: 534.2 mL / OUT: 200 mL / NET: 334.2 mL    PHYSICAL EXAM  General: appears ill  CNS: alert and oriented  HEENT: NCAT  Resp: +intubation, coarse breath sounds b/l  CVS: regular rate and rhythm, +s1s2, no murmurs, rubs, or gallops  Abd: soft, nontender, nondistended, +BS x4 quadrants, no guarding, vertical incision clean appearing with minimal bleeding from site  Ext: no clubbing, cyanosis or edema, +2 pedal pulses  Skin: warm and dry    MEDICATIONS  piperacillin/tazobactam IVPB.. IV Intermittent    hydrALAZINE Injectable IV Push PRN        dexMEDEtomidine Infusion IV Continuous  HYDROmorphone  Injectable IV Push PRN        pantoprazole  Injectable IV Push      lactated ringers. IV Continuous      chlorhexidine 2% Cloths Topical                            10.7   12.50 )-----------( 167      ( 26 Feb 2022 08:52 )             30.4     Bands 3.0    02-26    141  |  110<H>  |  8   ----------------------------<  122<H>  3.7   |  26  |  0.67    Ca    7.3<L>      26 Feb 2022 08:52  Phos  4.0     02-25  Mg     1.4     02-25    TPro  5.0<L>  /  Alb  2.6<L>  /  TBili  0.7  /  DBili  x   /  AST  45<H>  /  ALT  21  /  AlkPhos  40  02-26    Lactate 1.0           02-26 @ 08:52    Lactate 1.1           02-26 @ 05:35    Lactate 1.4           02-26 @ 00:55    Lactate 2.4           02-25 @ 20:31    Lactate 4.7           02-25 @ 17:02    Lactate 6.0           02-25 @ 14:15          PT/INR - ( 26 Feb 2022 08:52 )   PT: 15.6 sec;   INR: 1.33 ratio         PTT - ( 26 Feb 2022 08:52 )  PTT:27.2 sec          Radiology:     ACC: [id number] EXAM:  CT ANGIO ABD PELV (W)AW IC                        ACC: [id number] EXAM:  CT ANGIO CHEST PULM ART [facility]                          PROCEDURE DATE:  02/25/2022          INTERPRETATION:  CLINICAL INFORMATION: Intraoperative aortic injury with   primary repair following right ovarian cystectomy.    COMPARISON: None.    CONTRAST/COMPLICATIONS:  IV Contrast: Omnipaque 350 (accession [id number]), IV contrast documented   in associated exam (accession [id number])  90 cc administered (accession   [id number]), 0 cc administered (accession [id number])   10 cc discarded   (accession [id number]), 0 cc discarded (accession [id number])  Oral Contrast: NONE  Complications: None reported at time of study completion    PROCEDURE:  CT Angiography of the chest and abdomen was performed followed by portal   venous phase imaging of the Abdomen and Pelvis.  Sagittal and coronal reformats were performed as well as 3D (MIP)   reconstructions.    FINDINGS:    Metallic streak artifact related to patient's spinal fusion hardware   degrades image quality limiting evaluation.    CHEST:    LUNGS AND LARGE AIRWAYS: PLEURA:  Endotracheal tube, tip above the maritza.  The central airways are patent.    Mild dependent bibasilar atelectasis.    VESSELS: No acute pulmonary embolism noted in the main, central right or   left pulmonary arteries.    HEART: Heart size is normal. No pericardial effusion.    MEDIASTINUM AND HERO: No lymphadenopathy.    CHEST WALL AND LOWER NECK: Within normal limits.    ABDOMEN AND PELVIS:    LIVER: Within normal limits.  BILE DUCTS: Normal caliber.  GALLBLADDER: Within normal limits.  SPLEEN: Within normal limits.  PANCREAS: Within normal limits.  ADRENALS: Within normal limits.  KIDNEYS/URETERS:  Left perinephric hemorrhage andstranding.  Left kidney is displaced anteriorly by large left retroperitoneal   hematoma.  No hydronephrosis.    BLADDER: Junior catheter in decompressed bladder.  Air within the perivesical, extraperitoneal soft tissues.  REPRODUCTIVE ORGANS:  Low-density thickening of the endometrium.  Small amount of complex free fluid within the pelvis.    BOWEL: Nasogastric tube within the stomach which is mildly distended.  Colonic fecal retention.  No bowel obstruction.   Appendix normal.  PERITONEUM: Smallamount of complex free fluid/hemorrhage.  Small amount of free intraperitoneal air.    VESSELS:  Surgical clips are present at the infrarenal abdominal aorta.  The aorta is normal in caliber.  No extraluminal extravasation of contrast is noted.  The superior and inferior mesenteric arteries are patent.  The bilateral renal arteries are patent.  The bilateral common iliac arteries are patent.    RETROPERITONEUM/LYMPH NODES:  Large left retroperitoneal hematoma measuring approximately  9 x 7.5 x 15  cm. This displaces the left kidney anteriorly laterally and extends into   the pararenal space superiorly and left psoas musculature inferiorly.    ABDOMINAL WALL: Air within the subcutaneous soft tissues.    BONES:  Posterior spinal fusion T4-L1 with bilateral spinal sergio and pedicle screw   instrumentation.    IMPRESSION:    Large left retroperitoneal hematoma, which is displacing the kidney   anterolaterally.  No extravasation of contrast to suggest active bleeding.    Findings discussed withDr. Valiente at the time of interpretation on   2/25/2022.    Other findings as discussed above.    --- End of Report ---            KATIA ROTH MD; Attending Radiologist  This document has been electronically signed. Feb 25 2022  4:03PM    JUNIOR: Y                       DATE INSERTED: 2/25             REMOVE: N  A-LINE: Y                       DATE INSERTED: 2/25              REMOVE: N    GLOBAL ISSUE/BEST PRACTICE  Analgesia: Y  Sedation: N  CAM-ICU: neg  HOB elevation: yes  Stress ulcer prophylaxis: yes  VTE prophylaxis: neg  Glycemic control: neg  Nutrition: NPO    CODE STATUS: FULL CODE  [facility] discussion: Y       Patient is a 31y old  Female who presents with a chief complaint of elective surgery (26 Feb 2022 11:04)    24 hour events: patient febrile overnight Tm 101.3 at 8pm, sedated with propofol, weaned off pressors,   blood products in last 24h 7 PRBC (including 2 in OR), 1 FFP, 1 platelet    REVIEW OF SYSTEMS  limited as patient intubated   Gen: +sore throat  GI: +abdominal pain       T(F): 99.8 (02-26-22 @ 10:00), Max: 101.3 (02-25-22 @ 20:00)  HR: 88 (02-26-22 @ 11:30) (85 - 126)  BP: 99/60 (02-26-22 @ 11:30) (98/59 - 122/75)  RR: 20 (02-26-22 @ 11:30) (10 - 34)  SpO2: 94% (02-26-22 @ 11:30) (94% - 100%)  Wt(kg): --    Mode: CPAP with PS, FiO2: 30, PEEP: 5, PS: 5        I&O's Summary    02-25 @ 07:01  -  02-26 @ 07:00  --------------------------------------------------------  IN: 4094.2 mL / OUT: 1765 mL / NET: 2329.2 mL    02-26 @ 07:01  -  02-26 @ 11:43  --------------------------------------------------------  IN: 534.2 mL / OUT: 200 mL / NET: 334.2 mL    PHYSICAL EXAM  General: intubated, NAD  CNS: alert, answers questions appropriately  HEENT: NCAT  Resp: +intubation, clear breath sounds b/l  CVS: regular rate and rhythm, +s1s2, no murmurs, rubs, or gallops  Abd: soft, nontender, nondistended, +BS x4 quadrants, no guarding, incision clean appearing with minimal staining of dressing  Ext: no clubbing, cyanosis or edema, +2 pedal pulses  Skin: warm and dry    MEDICATIONS  piperacillin/tazobactam IVPB.. IV Intermittent    hydrALAZINE Injectable IV Push PRN        dexMEDEtomidine Infusion IV Continuous  HYDROmorphone  Injectable IV Push PRN        pantoprazole  Injectable IV Push      lactated ringers. IV Continuous      chlorhexidine 2% Cloths Topical                            10.7   12.50 )-----------( 167      ( 26 Feb 2022 08:52 )             30.4     Bands 3.0    02-26    141  |  110<H>  |  8   ----------------------------<  122<H>  3.7   |  26  |  0.67    Ca    7.3<L>      26 Feb 2022 08:52  Phos  4.0     02-25  Mg     1.4     02-25    TPro  5.0<L>  /  Alb  2.6<L>  /  TBili  0.7  /  DBili  x   /  AST  45<H>  /  ALT  21  /  AlkPhos  40  02-26    Lactate 1.0           02-26 @ 08:52    Lactate 1.1           02-26 @ 05:35    Lactate 1.4           02-26 @ 00:55    Lactate 2.4           02-25 @ 20:31    Lactate 4.7           02-25 @ 17:02    Lactate 6.0           02-25 @ 14:15          PT/INR - ( 26 Feb 2022 08:52 )   PT: 15.6 sec;   INR: 1.33 ratio         PTT - ( 26 Feb 2022 08:52 )  PTT:27.2 sec          Radiology:     ACC: [id number] EXAM:  CT ANGIO ABD PELV (W)AW IC                        ACC: [id number] EXAM:  CT ANGIO CHEST PULM ART [facility]                          PROCEDURE DATE:  02/25/2022          INTERPRETATION:  CLINICAL INFORMATION: Intraoperative aortic injury with   primary repair following right ovarian cystectomy.    COMPARISON: None.    CONTRAST/COMPLICATIONS:  IV Contrast: Omnipaque 350 (accession [id number]), IV contrast documented   in associated exam (accession [id number])  90 cc administered (accession   [id number]), 0 cc administered (accession [id number])   10 cc discarded   (accession [id number]), 0 cc discarded (accession [id number])  Oral Contrast: NONE  Complications: None reported at time of study completion    PROCEDURE:  CT Angiography of the chest and abdomen was performed followed by portal   venous phase imaging of the Abdomen and Pelvis.  Sagittal and coronal reformats were performed as well as 3D (MIP)   reconstructions.    FINDINGS:    Metallic streak artifact related to patient's spinal fusion hardware   degrades image quality limiting evaluation.    CHEST:    LUNGS AND LARGE AIRWAYS: PLEURA:  Endotracheal tube, tip above the maritza.  The central airways are patent.    Mild dependent bibasilar atelectasis.    VESSELS: No acute pulmonary embolism noted in the main, central right or   left pulmonary arteries.    HEART: Heart size is normal. No pericardial effusion.    MEDIASTINUM AND HERO: No lymphadenopathy.    CHEST WALL AND LOWER NECK: Within normal limits.    ABDOMEN AND PELVIS:    LIVER: Within normal limits.  BILE DUCTS: Normal caliber.  GALLBLADDER: Within normal limits.  SPLEEN: Within normal limits.  PANCREAS: Within normal limits.  ADRENALS: Within normal limits.  KIDNEYS/URETERS:  Left perinephric hemorrhage andstranding.  Left kidney is displaced anteriorly by large left retroperitoneal   hematoma.  No hydronephrosis.    BLADDER: Junior catheter in decompressed bladder.  Air within the perivesical, extraperitoneal soft tissues.  REPRODUCTIVE ORGANS:  Low-density thickening of the endometrium.  Small amount of complex free fluid within the pelvis.    BOWEL: Nasogastric tube within the stomach which is mildly distended.  Colonic fecal retention.  No bowel obstruction.   Appendix normal.  PERITONEUM: Smallamount of complex free fluid/hemorrhage.  Small amount of free intraperitoneal air.    VESSELS:  Surgical clips are present at the infrarenal abdominal aorta.  The aorta is normal in caliber.  No extraluminal extravasation of contrast is noted.  The superior and inferior mesenteric arteries are patent.  The bilateral renal arteries are patent.  The bilateral common iliac arteries are patent.    RETROPERITONEUM/LYMPH NODES:  Large left retroperitoneal hematoma measuring approximately  9 x 7.5 x 15  cm. This displaces the left kidney anteriorly laterally and extends into   the pararenal space superiorly and left psoas musculature inferiorly.    ABDOMINAL WALL: Air within the subcutaneous soft tissues.    BONES:  Posterior spinal fusion T4-L1 with bilateral spinal sergio and pedicle screw   instrumentation.    IMPRESSION:    Large left retroperitoneal hematoma, which is displacing the kidney   anterolaterally.  No extravasation of contrast to suggest active bleeding.    Findings discussed withDr. Valiente at the time of interpretation on   2/25/2022.    Other findings as discussed above.    --- End of Report ---            KATIA ROTH MD; Attending Radiologist  This document has been electronically signed. Feb 25 2022  4:03PM    JUNIOR: Y                       DATE INSERTED: 2/25             REMOVE: N  A-LINE: Y                       DATE INSERTED: 2/25              REMOVE: N    GLOBAL ISSUE/BEST PRACTICE  Analgesia: Y  Sedation: N  CAM-ICU: neg  HOB elevation: yes  Stress ulcer prophylaxis: yes  VTE prophylaxis: neg  Glycemic control: neg  Nutrition: NPO    CODE STATUS: FULL CODE         Patient is a 31y old  Female who presents with a chief complaint of elective surgery (26 Feb 2022 11:04)    24 hour events: patient febrile overnight Tm 101.3 at 8pm, sedated with propofol, weaned off pressors,   blood products in last 24h 5 PRBC (including 2 in OR), 1 FFP, 1 platelet    REVIEW OF SYSTEMS  limited as patient intubated   Gen: +sore throat  GI: +abdominal pain       T(F): 99.8 (02-26-22 @ 10:00), Max: 101.3 (02-25-22 @ 20:00)  HR: 88 (02-26-22 @ 11:30) (85 - 126)  BP: 99/60 (02-26-22 @ 11:30) (98/59 - 122/75)  RR: 20 (02-26-22 @ 11:30) (10 - 34)  SpO2: 94% (02-26-22 @ 11:30) (94% - 100%)  Wt(kg): --    Mode: CPAP with PS, FiO2: 30, PEEP: 5, PS: 5        I&O's Summary    02-25 @ 07:01  -  02-26 @ 07:00  --------------------------------------------------------  IN: 4094.2 mL / OUT: 1765 mL / NET: 2329.2 mL    02-26 @ 07:01  -  02-26 @ 11:43  --------------------------------------------------------  IN: 534.2 mL / OUT: 200 mL / NET: 334.2 mL    PHYSICAL EXAM  General: intubated, NAD  CNS: alert, answers questions appropriately  HEENT: NCAT  Resp: +intubation, clear breath sounds b/l  CVS: regular rate and rhythm, +s1s2, no murmurs, rubs, or gallops  Abd: soft, nontender, nondistended, +BS x4 quadrants, no guarding, incision clean appearing with minimal staining of dressing  Ext: no clubbing, cyanosis or edema, +2 pedal pulses  Skin: warm and dry    MEDICATIONS  piperacillin/tazobactam IVPB.. IV Intermittent    hydrALAZINE Injectable IV Push PRN        dexMEDEtomidine Infusion IV Continuous  HYDROmorphone  Injectable IV Push PRN        pantoprazole  Injectable IV Push      lactated ringers. IV Continuous      chlorhexidine 2% Cloths Topical                            10.7   12.50 )-----------( 167      ( 26 Feb 2022 08:52 )             30.4     Bands 3.0    02-26    141  |  110<H>  |  8   ----------------------------<  122<H>  3.7   |  26  |  0.67    Ca    7.3<L>      26 Feb 2022 08:52  Phos  4.0     02-25  Mg     1.4     02-25    TPro  5.0<L>  /  Alb  2.6<L>  /  TBili  0.7  /  DBili  x   /  AST  45<H>  /  ALT  21  /  AlkPhos  40  02-26    Lactate 1.0           02-26 @ 08:52    Lactate 1.1           02-26 @ 05:35    Lactate 1.4           02-26 @ 00:55    Lactate 2.4           02-25 @ 20:31    Lactate 4.7           02-25 @ 17:02    Lactate 6.0           02-25 @ 14:15          PT/INR - ( 26 Feb 2022 08:52 )   PT: 15.6 sec;   INR: 1.33 ratio         PTT - ( 26 Feb 2022 08:52 )  PTT:27.2 sec          Radiology:     ACC: [id number] EXAM:  CT ANGIO ABD PELV (W)AW IC                        ACC: [id number] EXAM:  CT ANGIO CHEST PULM ART [facility]                          PROCEDURE DATE:  02/25/2022          INTERPRETATION:  CLINICAL INFORMATION: Intraoperative aortic injury with   primary repair following right ovarian cystectomy.    COMPARISON: None.    CONTRAST/COMPLICATIONS:  IV Contrast: Omnipaque 350 (accession [id number]), IV contrast documented   in associated exam (accession [id number])  90 cc administered (accession   [id number]), 0 cc administered (accession [id number])   10 cc discarded   (accession [id number]), 0 cc discarded (accession [id number])  Oral Contrast: NONE  Complications: None reported at time of study completion    PROCEDURE:  CT Angiography of the chest and abdomen was performed followed by portal   venous phase imaging of the Abdomen and Pelvis.  Sagittal and coronal reformats were performed as well as 3D (MIP)   reconstructions.    FINDINGS:    Metallic streak artifact related to patient's spinal fusion hardware   degrades image quality limiting evaluation.    CHEST:    LUNGS AND LARGE AIRWAYS: PLEURA:  Endotracheal tube, tip above the maritza.  The central airways are patent.    Mild dependent bibasilar atelectasis.    VESSELS: No acute pulmonary embolism noted in the main, central right or   left pulmonary arteries.    HEART: Heart size is normal. No pericardial effusion.    MEDIASTINUM AND HERO: No lymphadenopathy.    CHEST WALL AND LOWER NECK: Within normal limits.    ABDOMEN AND PELVIS:    LIVER: Within normal limits.  BILE DUCTS: Normal caliber.  GALLBLADDER: Within normal limits.  SPLEEN: Within normal limits.  PANCREAS: Within normal limits.  ADRENALS: Within normal limits.  KIDNEYS/URETERS:  Left perinephric hemorrhage andstranding.  Left kidney is displaced anteriorly by large left retroperitoneal   hematoma.  No hydronephrosis.    BLADDER: Junior catheter in decompressed bladder.  Air within the perivesical, extraperitoneal soft tissues.  REPRODUCTIVE ORGANS:  Low-density thickening of the endometrium.  Small amount of complex free fluid within the pelvis.    BOWEL: Nasogastric tube within the stomach which is mildly distended.  Colonic fecal retention.  No bowel obstruction.   Appendix normal.  PERITONEUM: Smallamount of complex free fluid/hemorrhage.  Small amount of free intraperitoneal air.    VESSELS:  Surgical clips are present at the infrarenal abdominal aorta.  The aorta is normal in caliber.  No extraluminal extravasation of contrast is noted.  The superior and inferior mesenteric arteries are patent.  The bilateral renal arteries are patent.  The bilateral common iliac arteries are patent.    RETROPERITONEUM/LYMPH NODES:  Large left retroperitoneal hematoma measuring approximately  9 x 7.5 x 15  cm. This displaces the left kidney anteriorly laterally and extends into   the pararenal space superiorly and left psoas musculature inferiorly.    ABDOMINAL WALL: Air within the subcutaneous soft tissues.    BONES:  Posterior spinal fusion T4-L1 with bilateral spinal sergio and pedicle screw   instrumentation.    IMPRESSION:    Large left retroperitoneal hematoma, which is displacing the kidney   anterolaterally.  No extravasation of contrast to suggest active bleeding.    Findings discussed withDr. Valiente at the time of interpretation on   2/25/2022.    Other findings as discussed above.    --- End of Report ---            KATIA ROTH MD; Attending Radiologist  This document has been electronically signed. Feb 25 2022  4:03PM    JUNIOR: Y                       DATE INSERTED: 2/25             REMOVE: N  A-LINE: Y                       DATE INSERTED: 2/25              REMOVE: N    GLOBAL ISSUE/BEST PRACTICE  Analgesia: Y  Sedation: N  CAM-ICU: neg  HOB elevation: yes  Stress ulcer prophylaxis: yes  VTE prophylaxis: neg  Glycemic control: neg  Nutrition: NPO    CODE STATUS: FULL CODE

## 2022-02-26 NOTE — PROGRESS NOTE ADULT - SUBJECTIVE AND OBJECTIVE BOX
SUBJECTIVE:  Patient seen and examined at bedside this AM, intubated, stable. C/o some abdominal pain. She is now s/p extubation this afternoon.     Vital Signs Last 24 Hrs  T(C): 37.1 (26 Feb 2022 16:00), Max: 38.5 (25 Feb 2022 20:00)  T(F): 98.8 (26 Feb 2022 16:00), Max: 101.3 (25 Feb 2022 20:00)  HR: 85 (26 Feb 2022 16:00) (75 - 126)  BP: 98/59 (26 Feb 2022 16:00) (90/51 - 122/75)  BP(mean): 73 (26 Feb 2022 16:00) (65 - 90)  RR: 21 (26 Feb 2022 16:00) (15 - 28)  SpO2: 92% (26 Feb 2022 16:00) (90% - 100%)    PHYSICAL EXAM:  ABDOMEN: Steri-strips in place, clean, dry and intact. Soft, nondistended, tender to palpation. +BS  EXT: +distal pulses x 4 extremities  NEUROLOGY: A&O x 3    LABS:                        10.5   14.51 )-----------( 169      ( 26 Feb 2022 13:17 )             30.8     02-26    141  |  110<H>  |  8   ----------------------------<  122<H>  3.7   |  26  |  0.67    Ca    7.3<L>      26 Feb 2022 08:52  Phos  4.0     02-25  Mg     1.4     02-25    TPro  5.0<L>  /  Alb  2.6<L>  /  TBili  0.7  /  DBili  x   /  AST  45<H>  /  ALT  21  /  AlkPhos  40  02-26    PT/INR - ( 26 Feb 2022 08:52 )   PT: 15.6 sec;   INR: 1.33 ratio      PTT - ( 26 Feb 2022 08:52 )  PTT:27.2 sec        RADIOLOGY:    < from: CT Angio Abdomen and Pelvis w/ IV Cont (02.25.22 @ 15:28) >  ACC: [id number] EXAM:  CT ANGIO ABD PELV (W)AW IC                        ACC: [id number] EXAM:  CT ANGIO CHEST PULM ART [facility]                        PROCEDURE DATE:  02/25/2022   INTERPRETATION:  CLINICAL INFORMATION: Intraoperative aortic injury with   primary repair following right ovarian cystectomy.  COMPARISON: None.  CONTRAST/COMPLICATIONS:  IV Contrast: Omnipaque 350 (accession [id number]), IV contrast documented   in associated exam (accession [id number])  90 cc administered (accession   [id number]), 0 cc administered (accession [id number])   10 cc discarded   (accession [id number]), 0 cc discarded (accession [id number])  Oral Contrast: NONE  Complications: None reported at time of study completion  PROCEDURE:  CT Angiography of the chest and abdomen was performed followed by portal   venous phase imaging of the Abdomen and Pelvis.  Sagittal and coronal reformats were performed as well as 3D (MIP)   reconstructions.    FINDINGS:  Metallic streak artifact related to patient's spinal fusion hardware   degrades image quality limiting evaluation.  CHEST:  LUNGS AND LARGE AIRWAYS: PLEURA:  Endotracheal tube, tip above the maritza.  The central airways are patent.  Mild dependent bibasilar atelectasis.  VESSELS: No acute pulmonary embolism noted in the main, central right or   left pulmonary arteries.  HEART: Heart size is normal. No pericardial effusion.  MEDIASTINUM AND HERO: No lymphadenopathy.  CHEST WALL AND LOWER NECK: Within normal limits.  ABDOMEN AND PELVIS:  LIVER: Within normal limits.  BILE DUCTS: Normal caliber.  GALLBLADDER: Within normal limits.  SPLEEN: Within normal limits.  PANCREAS: Within normal limits.  ADRENALS: Within normal limits.  KIDNEYS/URETERS:  Left perinephric hemorrhage andstranding.  Left kidney is displaced anteriorly by large left retroperitoneal   hematoma.  No hydronephrosis.  BLADDER: Patel catheter in decompressed bladder.  Air within the perivesical, extraperitoneal soft tissues.  REPRODUCTIVE ORGANS:  Low-density thickening of the endometrium.  Small amount of complex free fluid within the pelvis.  BOWEL: Nasogastric tube within the stomach which is mildly distended.  Colonic fecal retention.  No bowel obstruction.   Appendix normal.  PERITONEUM: Smallamount of complex free fluid/hemorrhage.  Small amount of free intraperitoneal air.  VESSELS:  Surgical clips are present at the infrarenal abdominal aorta.  The aorta is normal in caliber.  No extraluminal extravasation of contrast is noted.  The superior and inferior mesenteric arteries are patent.  The bilateral renal arteries are patent.  The bilateral common iliac arteries are patent.  RETROPERITONEUM/LYMPH NODES:  Large left retroperitoneal hematoma measuring approximately  9 x 7.5 x 15  cm. This displaces the left kidney anteriorly laterally and extends into   the pararenal space superiorly and left psoas musculature inferiorly.  ABDOMINAL WALL: Air within the subcutaneous soft tissues.  BONES:  Posterior spinal fusion T4-L1 with bilateral spinal sergio and pedicle screw   instrumentation.    IMPRESSION:  Large left retroperitoneal hematoma, which is displacing the kidney   anterolaterally.  No extravasation of contrast to suggest active bleeding.  Findings discussed withDr. Valiente at the time of interpretation on   2/25/2022.  Other findings as discussed above.  --- End of Report ---  KATIA ROTH MD; Attending Radiologist  This document has been electronically signed. Feb 25 2022  4:03PM  < end of copied text >

## 2022-02-27 LAB
ALBUMIN SERPL ELPH-MCNC: 2.4 G/DL — LOW (ref 3.3–5)
ALP SERPL-CCNC: 45 U/L — SIGNIFICANT CHANGE UP (ref 40–120)
ALT FLD-CCNC: 20 U/L — SIGNIFICANT CHANGE UP (ref 12–78)
ANION GAP SERPL CALC-SCNC: 6 MMOL/L — SIGNIFICANT CHANGE UP (ref 5–17)
APTT BLD: 26.3 SEC — LOW (ref 27.5–35.5)
AST SERPL-CCNC: 50 U/L — HIGH (ref 15–37)
BASOPHILS # BLD AUTO: 0.02 K/UL — SIGNIFICANT CHANGE UP (ref 0–0.2)
BASOPHILS NFR BLD AUTO: 0.2 % — SIGNIFICANT CHANGE UP (ref 0–2)
BILIRUB SERPL-MCNC: 0.5 MG/DL — SIGNIFICANT CHANGE UP (ref 0.2–1.2)
BUN SERPL-MCNC: 8 MG/DL — SIGNIFICANT CHANGE UP (ref 7–23)
CALCIUM SERPL-MCNC: 7.6 MG/DL — LOW (ref 8.5–10.1)
CHLORIDE SERPL-SCNC: 110 MMOL/L — HIGH (ref 96–108)
CO2 SERPL-SCNC: 25 MMOL/L — SIGNIFICANT CHANGE UP (ref 22–31)
CREAT SERPL-MCNC: 0.59 MG/DL — SIGNIFICANT CHANGE UP (ref 0.5–1.3)
EOSINOPHIL # BLD AUTO: 0 K/UL — SIGNIFICANT CHANGE UP (ref 0–0.5)
EOSINOPHIL NFR BLD AUTO: 0 % — SIGNIFICANT CHANGE UP (ref 0–6)
GLUCOSE SERPL-MCNC: 134 MG/DL — HIGH (ref 70–99)
HCT VFR BLD CALC: 27.9 % — LOW (ref 34.5–45)
HCT VFR BLD CALC: 28.4 % — LOW (ref 34.5–45)
HCT VFR BLD CALC: 28.5 % — LOW (ref 34.5–45)
HCT VFR BLD CALC: 29.7 % — LOW (ref 34.5–45)
HGB BLD-MCNC: 10.3 G/DL — LOW (ref 11.5–15.5)
HGB BLD-MCNC: 9.5 G/DL — LOW (ref 11.5–15.5)
HGB BLD-MCNC: 9.5 G/DL — LOW (ref 11.5–15.5)
HGB BLD-MCNC: 9.8 G/DL — LOW (ref 11.5–15.5)
IMM GRANULOCYTES NFR BLD AUTO: 0.5 % — SIGNIFICANT CHANGE UP (ref 0–1.5)
INR BLD: 1.25 RATIO — HIGH (ref 0.88–1.16)
LACTATE SERPL-SCNC: 0.7 MMOL/L — SIGNIFICANT CHANGE UP (ref 0.7–2)
LYMPHOCYTES # BLD AUTO: 1.27 K/UL — SIGNIFICANT CHANGE UP (ref 1–3.3)
LYMPHOCYTES # BLD AUTO: 10 % — LOW (ref 13–44)
MAGNESIUM SERPL-MCNC: 2.2 MG/DL — SIGNIFICANT CHANGE UP (ref 1.6–2.6)
MCHC RBC-ENTMCNC: 29.3 PG — SIGNIFICANT CHANGE UP (ref 27–34)
MCHC RBC-ENTMCNC: 30.2 PG — SIGNIFICANT CHANGE UP (ref 27–34)
MCHC RBC-ENTMCNC: 30.5 PG — SIGNIFICANT CHANGE UP (ref 27–34)
MCHC RBC-ENTMCNC: 33.3 GM/DL — SIGNIFICANT CHANGE UP (ref 32–36)
MCHC RBC-ENTMCNC: 34.5 GM/DL — SIGNIFICANT CHANGE UP (ref 32–36)
MCHC RBC-ENTMCNC: 34.7 GM/DL — SIGNIFICANT CHANGE UP (ref 32–36)
MCV RBC AUTO: 87.1 FL — SIGNIFICANT CHANGE UP (ref 80–100)
MCV RBC AUTO: 88 FL — SIGNIFICANT CHANGE UP (ref 80–100)
MCV RBC AUTO: 88.5 FL — SIGNIFICANT CHANGE UP (ref 80–100)
MONOCYTES # BLD AUTO: 0.95 K/UL — HIGH (ref 0–0.9)
MONOCYTES NFR BLD AUTO: 7.5 % — SIGNIFICANT CHANGE UP (ref 2–14)
NEUTROPHILS # BLD AUTO: 10.42 K/UL — HIGH (ref 1.8–7.4)
NEUTROPHILS NFR BLD AUTO: 81.8 % — HIGH (ref 43–77)
NRBC # BLD: 0 /100 WBCS — SIGNIFICANT CHANGE UP (ref 0–0)
PHOSPHATE SERPL-MCNC: 2.5 MG/DL — SIGNIFICANT CHANGE UP (ref 2.5–4.5)
PLATELET # BLD AUTO: 169 K/UL — SIGNIFICANT CHANGE UP (ref 150–400)
PLATELET # BLD AUTO: 175 K/UL — SIGNIFICANT CHANGE UP (ref 150–400)
PLATELET # BLD AUTO: 180 K/UL — SIGNIFICANT CHANGE UP (ref 150–400)
POTASSIUM SERPL-MCNC: 3.9 MMOL/L — SIGNIFICANT CHANGE UP (ref 3.5–5.3)
POTASSIUM SERPL-SCNC: 3.9 MMOL/L — SIGNIFICANT CHANGE UP (ref 3.5–5.3)
PROT SERPL-MCNC: 5.2 G/DL — LOW (ref 6–8.3)
PROTHROM AB SERPL-ACNC: 14.6 SEC — HIGH (ref 10.5–13.4)
RBC # BLD: 3.21 M/UL — LOW (ref 3.8–5.2)
RBC # BLD: 3.24 M/UL — LOW (ref 3.8–5.2)
RBC # BLD: 3.41 M/UL — LOW (ref 3.8–5.2)
RBC # FLD: 13.3 % — SIGNIFICANT CHANGE UP (ref 10.3–14.5)
RBC # FLD: 13.5 % — SIGNIFICANT CHANGE UP (ref 10.3–14.5)
RBC # FLD: 13.7 % — SIGNIFICANT CHANGE UP (ref 10.3–14.5)
SODIUM SERPL-SCNC: 141 MMOL/L — SIGNIFICANT CHANGE UP (ref 135–145)
WBC # BLD: 12.28 K/UL — HIGH (ref 3.8–10.5)
WBC # BLD: 12.73 K/UL — HIGH (ref 3.8–10.5)
WBC # BLD: 14.93 K/UL — HIGH (ref 3.8–10.5)
WBC # FLD AUTO: 12.28 K/UL — HIGH (ref 3.8–10.5)
WBC # FLD AUTO: 12.73 K/UL — HIGH (ref 3.8–10.5)
WBC # FLD AUTO: 14.93 K/UL — HIGH (ref 3.8–10.5)

## 2022-02-27 PROCEDURE — 99233 SBSQ HOSP IP/OBS HIGH 50: CPT

## 2022-02-27 RX ADMIN — HYDROMORPHONE HYDROCHLORIDE 0.5 MILLIGRAM(S): 2 INJECTION INTRAMUSCULAR; INTRAVENOUS; SUBCUTANEOUS at 05:55

## 2022-02-27 RX ADMIN — HYDROMORPHONE HYDROCHLORIDE 1 MILLIGRAM(S): 2 INJECTION INTRAMUSCULAR; INTRAVENOUS; SUBCUTANEOUS at 21:00

## 2022-02-27 RX ADMIN — HYDROMORPHONE HYDROCHLORIDE 0.5 MILLIGRAM(S): 2 INJECTION INTRAMUSCULAR; INTRAVENOUS; SUBCUTANEOUS at 12:36

## 2022-02-27 RX ADMIN — PIPERACILLIN AND TAZOBACTAM 25 GRAM(S): 4; .5 INJECTION, POWDER, LYOPHILIZED, FOR SOLUTION INTRAVENOUS at 21:36

## 2022-02-27 RX ADMIN — HYDROMORPHONE HYDROCHLORIDE 1 MILLIGRAM(S): 2 INJECTION INTRAMUSCULAR; INTRAVENOUS; SUBCUTANEOUS at 19:34

## 2022-02-27 RX ADMIN — HYDROMORPHONE HYDROCHLORIDE 0.5 MILLIGRAM(S): 2 INJECTION INTRAMUSCULAR; INTRAVENOUS; SUBCUTANEOUS at 16:45

## 2022-02-27 RX ADMIN — HYDROMORPHONE HYDROCHLORIDE 1 MILLIGRAM(S): 2 INJECTION INTRAMUSCULAR; INTRAVENOUS; SUBCUTANEOUS at 07:55

## 2022-02-27 RX ADMIN — HYDROMORPHONE HYDROCHLORIDE 1 MILLIGRAM(S): 2 INJECTION INTRAMUSCULAR; INTRAVENOUS; SUBCUTANEOUS at 08:20

## 2022-02-27 RX ADMIN — HYDROMORPHONE HYDROCHLORIDE 0.5 MILLIGRAM(S): 2 INJECTION INTRAMUSCULAR; INTRAVENOUS; SUBCUTANEOUS at 05:40

## 2022-02-27 RX ADMIN — HYDROMORPHONE HYDROCHLORIDE 0.5 MILLIGRAM(S): 2 INJECTION INTRAMUSCULAR; INTRAVENOUS; SUBCUTANEOUS at 16:28

## 2022-02-27 RX ADMIN — HYDROMORPHONE HYDROCHLORIDE 0.5 MILLIGRAM(S): 2 INJECTION INTRAMUSCULAR; INTRAVENOUS; SUBCUTANEOUS at 12:21

## 2022-02-27 RX ADMIN — PIPERACILLIN AND TAZOBACTAM 25 GRAM(S): 4; .5 INJECTION, POWDER, LYOPHILIZED, FOR SOLUTION INTRAVENOUS at 12:21

## 2022-02-27 RX ADMIN — HYDROMORPHONE HYDROCHLORIDE 0.5 MILLIGRAM(S): 2 INJECTION INTRAMUSCULAR; INTRAVENOUS; SUBCUTANEOUS at 22:58

## 2022-02-27 RX ADMIN — CHLORHEXIDINE GLUCONATE 1 APPLICATION(S): 213 SOLUTION TOPICAL at 05:36

## 2022-02-27 RX ADMIN — PIPERACILLIN AND TAZOBACTAM 25 GRAM(S): 4; .5 INJECTION, POWDER, LYOPHILIZED, FOR SOLUTION INTRAVENOUS at 05:36

## 2022-02-27 NOTE — ANESTHESIA FOLLOW-UP NOTE - NSRECOMMENDFT_GEN_ALL_CORE
Patient was extubated yesterday Vital signs are stable I had a small conversation with her NG tube in situ . Spoke to ICU attaending patien will be moved out of the ICU today or tomorrow
Patient remains intubated. Had another episode of Hypotension in the ICU post op yesterday Required MTP CT showed Retroperitoneal hematoma . BP Stabilised after MTP and Levo. This morning Patient opens her eyes in response to name Vitals Stable BP 97/68 HR 90 SpO2 98 Good urine output I discussed case with Dr Moser ICU attending on call She is quite optimistic about being able to extubate patient later today

## 2022-02-27 NOTE — PROGRESS NOTE ADULT - ASSESSMENT
32 yo POD #2 s/p D & C hysteroscopy, right ovarian cystectomy, chromotubation, laparotomy, aortic repair and MTP s/p 5 prbcs   Vital signs and H& H remain stable   Afebrile   Advance diet as tolerated today   Continue zosyn   DC evans   f/u trial of void   scds in place   recommend incentive spirometry   encourage out of bed   pain control

## 2022-02-27 NOTE — PROGRESS NOTE ADULT - SUBJECTIVE AND OBJECTIVE BOX
Patient is a 31y old  Female who presents with a chief complaint of elective surgery (26 Feb 2022 11:04)    24 hour events: ***    REVIEW OF SYSTEMS  Constitutional: No fever, chills, fatigue  Neuro: No headache, numbness, weakness  Resp: No cough, wheezing, shortness of breath  CVS: No chest pain, palpitations, leg swelling  GI: No abdominal pain, nausea, vomiting, diarrhea   : No dysuria, frequency, incontinence  Skin: No itching, burning, rashes, or lesions   Msk: No joint pain or swelling  Psych: No depression, anxiety, mood swings  Heme: No bleeding    T(F): 99.3 (02-27-22 @ 06:00), Max: 99.8 (02-26-22 @ 10:00)  HR: 85 (02-27-22 @ 06:00) (75 - 100)  BP: 102/58 (02-27-22 @ 06:00) (90/51 - 113/58)  RR: 18 (02-27-22 @ 06:00) (15 - 28)  SpO2: 97% (02-27-22 @ 06:00) (89% - 99%)  Wt(kg): --            I&O's Summary    02-26 @ 07:01  -  02-27 @ 07:00  --------------------------------------------------------  IN: 2934.2 mL / OUT: 2875 mL / NET: 59.2 mL      PHYSICAL EXAM  General:   CNS:   HEENT:   Resp:   CVS:   Abd:   Ext:   Skin:     MEDICATIONS  piperacillin/tazobactam IVPB.. IV Intermittent          HYDROmorphone  Injectable IV Push PRN  HYDROmorphone  Injectable IV Push PRN            lactated ringers. IV Continuous      chlorhexidine 2% Cloths Topical                            10.3   14.93 )-----------( 169      ( 27 Feb 2022 00:23 )             29.7       02-26    141  |  110<H>  |  8   ----------------------------<  122<H>  3.7   |  26  |  0.67    Ca    7.3<L>      26 Feb 2022 08:52  Phos  4.0     02-25  Mg     1.4     02-25    TPro  5.0<L>  /  Alb  2.6<L>  /  TBili  0.7  /  DBili  x   /  AST  45<H>  /  ALT  21  /  AlkPhos  40  02-26    Lactate 1.0           02-26 @ 08:52          PT/INR - ( 26 Feb 2022 08:52 )   PT: 15.6 sec;   INR: 1.33 ratio         PTT - ( 26 Feb 2022 08:52 )  PTT:27.2 sec    .Blood Blood   No growth to date. -- 02-25 @ 18:34        Radiology: ***  Bedside lung ultrasound: ***  Bedside ECHO: ***    CENTRAL LINE: Y/N          DATE INSERTED:              REMOVE: Y/N  JUNIOR: Y/N                        DATE INSERTED:              REMOVE: Y/N  A-LINE: Y/N                       DATE INSERTED:              REMOVE: Y/N    GLOBAL ISSUE/BEST PRACTICE  Analgesia:   Sedation:   CAM-ICU:   HOB elevation: yes  Stress ulcer prophylaxis:   VTE prophylaxis:   Glycemic control:   Nutrition:     CODE STATUS: ***  [facility] discussion: Y       Patient is a 31y old  Female who presents with a chief complaint of elective surgery (26 Feb 2022 11:04)    24 hour events: extubated on 2/26    REVIEW OF SYSTEMS  Constitutional: No fever, chills, fatigue  Neuro: No headache, numbness, weakness  Resp: No cough, wheezing, shortness of breath  CVS: No chest pain, palpitations, leg swelling  GI: No vomiting, diarrhea +abdominal pain, nausea  : No dysuria, frequency, incontinence  Skin: No itching, burning, rashes, or lesions   Msk: No joint pain or swelling  Heme: No bleeding    T(F): 99.3 (02-27-22 @ 06:00), Max: 99.8 (02-26-22 @ 10:00)  HR: 85 (02-27-22 @ 06:00) (75 - 100)  BP: 102/58 (02-27-22 @ 06:00) (90/51 - 113/58)  RR: 18 (02-27-22 @ 06:00) (15 - 28)  SpO2: 97% (02-27-22 @ 06:00) (89% - 99%)  Wt(kg): --            I&O's Summary    02-26 @ 07:01  -  02-27 @ 07:00  --------------------------------------------------------  IN: 2934.2 mL / OUT: 2875 mL / NET: 59.2 mL      PHYSICAL EXAM  General:   CNS:   HEENT:   Resp:   CVS:   Abd:   Ext:   Skin:     MEDICATIONS  piperacillin/tazobactam IVPB.. IV Intermittent          HYDROmorphone  Injectable IV Push PRN  HYDROmorphone  Injectable IV Push PRN            lactated ringers. IV Continuous      chlorhexidine 2% Cloths Topical                            10.3   14.93 )-----------( 169      ( 27 Feb 2022 00:23 )             29.7       02-26    141  |  110<H>  |  8   ----------------------------<  122<H>  3.7   |  26  |  0.67    Ca    7.3<L>      26 Feb 2022 08:52  Phos  4.0     02-25  Mg     1.4     02-25    TPro  5.0<L>  /  Alb  2.6<L>  /  TBili  0.7  /  DBili  x   /  AST  45<H>  /  ALT  21  /  AlkPhos  40  02-26    Lactate 1.0           02-26 @ 08:52          PT/INR - ( 26 Feb 2022 08:52 )   PT: 15.6 sec;   INR: 1.33 ratio         PTT - ( 26 Feb 2022 08:52 )  PTT:27.2 sec    .Blood Blood   No growth to date. -- 02-25 @ 18:34          JUNIOR: Y                       DATE INSERTED:2/25              REMOVE: Y      GLOBAL ISSUE/BEST PRACTICE  Analgesia: Y  Sedation: N  CAM-ICU: neg  HOB elevation: yes  Stress ulcer prophylaxis: yes  VTE prophylaxis: neg  Glycemic control: neg  Nutrition: NPO    CODE STATUS: FULL CODE       Patient is a 31y old  Female who presents with a chief complaint of elective surgery (26 Feb 2022 11:04)    24 hour events: extubated on 2/26    REVIEW OF SYSTEMS  Constitutional: No fever, chills, fatigue  Neuro: No headache, numbness, weakness  Resp: No cough, wheezing, shortness of breath  CVS: No chest pain, palpitations, leg swelling  GI: No vomiting, diarrhea +abdominal pain, nausea  : No dysuria, frequency, incontinence  Skin: No itching, burning, rashes, or lesions   Msk: No joint pain or swelling  Heme: No bleeding    T(F): 99.3 (02-27-22 @ 06:00), Max: 99.8 (02-26-22 @ 10:00)  HR: 85 (02-27-22 @ 06:00) (75 - 100)  BP: 102/58 (02-27-22 @ 06:00) (90/51 - 113/58)  RR: 18 (02-27-22 @ 06:00) (15 - 28)  SpO2: 97% (02-27-22 @ 06:00) (89% - 99%)  Wt(kg): --            I&O's Summary    02-26 @ 07:01  -  02-27 @ 07:00  --------------------------------------------------------  IN: 2934.2 mL / OUT: 2875 mL / NET: 59.2 mL      PHYSICAL EXAM  General: NAD, lying in bed  CNS: alert, answers questions appropriately  HEENT: NCAT  Resp: clear breath sounds b/l  CVS: regular rate and rhythm, +s1s2, no murmurs, rubs, or gallops   Abd: soft, nontender, nondistended, x4,  no guarding, incision clean appearing with minimal staining of dressing  Ext: no clubbing, cyanosis or edema, +2 pedal pulses  Skin: warm and dry    MEDICATIONS  piperacillin/tazobactam IVPB.. IV Intermittent          HYDROmorphone  Injectable IV Push PRN  HYDROmorphone  Injectable IV Push PRN            lactated ringers. IV Continuous      chlorhexidine 2% Cloths Topical                            10.3   14.93 )-----------( 169      ( 27 Feb 2022 00:23 )             29.7       02-26    141  |  110<H>  |  8   ----------------------------<  122<H>  3.7   |  26  |  0.67    Ca    7.3<L>      26 Feb 2022 08:52  Phos  4.0     02-25  Mg     1.4     02-25    TPro  5.0<L>  /  Alb  2.6<L>  /  TBili  0.7  /  DBili  x   /  AST  45<H>  /  ALT  21  /  AlkPhos  40  02-26    Lactate 1.0           02-26 @ 08:52          PT/INR - ( 26 Feb 2022 08:52 )   PT: 15.6 sec;   INR: 1.33 ratio         PTT - ( 26 Feb 2022 08:52 )  PTT:27.2 sec    .Blood Blood   No growth to date. -- 02-25 @ 18:34          SANDI: RAJANI                       DATE INSERTED:2/25              REMOVE: Y      GLOBAL ISSUE/BEST PRACTICE  Analgesia: Y  Sedation: N  CAM-ICU: neg  HOB elevation: yes  Stress ulcer prophylaxis: yes  VTE prophylaxis: neg  Glycemic control: neg  Nutrition: NPO    CODE STATUS: FULL CODE

## 2022-02-27 NOTE — PROGRESS NOTE ADULT - ASSESSMENT
31 y/ F w. pmh of anxiety, ovarian cysts, presented to [facility] on 02/25/2022 for scheduled elective D&C, hysteroscopy and laparoscopic R. ovarian cystectomy, OR courser c/b vascular injury to the aortic bifucation w/ 2L of BL repaired in the OR by vascular surgery, RP hematoma, intra OP pt was given 2U of PRBC and 4L of IVF, pt was left intubated and transfer to the MICU for HD monitoring, MICU courser c/b hypotension requiring an MTA activation pt was given additional 3U of PRBC, 1U of plts, 1FFP and emergent CTA done of chest/abd/pelvis w/ no active extravasation.       -Neuro: no acute issues, cont Dilaudid for pain control  -Cardiac: Hemorrhagic shock now resolved maintain MAP >65  -Resp: no acute issues, o2 stable on RA  -GI: NPO, NGT to LIWS, GI ppx w/ protonix, monitor bladder pressures of abdominal compartment syndrome, Post-op care per surgical/GYN/vascular team  -Renal: Renal function stable cont to trend and monitor lytes  -ID: cont emerpric courser of IV Zosyn  -Heme: ABLA s/p code MTP now s/p 5U of PRBC, 1U of plts, 1FFP CBC holding steady cont to trend, DVT ppx w/ SCD  -Endo: no acute issues, monitor FS q8h  -Dispo: Pt remains in MICU for close observation, pt is full code, dispo pending hospital course

## 2022-02-27 NOTE — PROGRESS NOTE ADULT - SUBJECTIVE AND OBJECTIVE BOX
24 hour events:     Review of Systems:  Constitutional: No fever, chills, fatigue  Neuro: No headache, numbness, weakness  Resp: No cough, wheezing, shortness of breath  CVS: No chest pain, palpitations, leg swelling  GI: No abdominal pain, nausea, vomiting, diarrhea   : No dysuria, frequency, incontinence  Skin: No itching, burning, rashes, or lesions   Msk: No joint pain or swelling  Psych: No depression, anxiety, mood swings    T(F): 99.3 (02-26-22 @ 23:33), Max: 100.2 (02-26-22 @ 04:00)  HR: 82 (02-27-22 @ 00:00) (75 - 106)  BP: 103/56 (02-27-22 @ 00:00) (90/51 - 116/74)  RR: 16 (02-27-22 @ 00:00) (15 - 28)  SpO2: 97% (02-27-22 @ 00:00) (89% - 100%)  Wt(kg): --    Mode: CPAP with PS, FiO2: 30, PEEP: 5, PS: 5  02-25-22 @ 07:01  -  02-26-22 @ 07:00  --------------------------------------------------------  IN: 4094.2 mL / OUT: 1765 mL / NET: 2329.2 mL    02-26-22 @ 07:01  -  02-27-22 @ 00:38  --------------------------------------------------------  IN: 2134.2 mL / OUT: 1675 mL / NET: 459.2 mL        CAPILLARY BLOOD GLUCOSE          I&O's Summary    25 Feb 2022 07:01  -  26 Feb 2022 07:00  --------------------------------------------------------  IN: 4094.2 mL / OUT: 1765 mL / NET: 2329.2 mL    26 Feb 2022 07:01  -  27 Feb 2022 00:38  --------------------------------------------------------  IN: 2134.2 mL / OUT: 1675 mL / NET: 459.2 mL        Physical Exam:   Gen:  Neuro:  HEENT:  Resp:  CVS:  Abd:  Ext:  Skin:    Meds:  piperacillin/tazobactam IVPB.. IV Intermittent          HYDROmorphone  Injectable IV Push PRN  HYDROmorphone  Injectable IV Push PRN            lactated ringers. IV Continuous      chlorhexidine 2% Cloths Topical                              10.3   14.93 )-----------( 169      ( 27 Feb 2022 00:23 )             29.7       02-26    141  |  110<H>  |  8   ----------------------------<  122<H>  3.7   |  26  |  0.67    Ca    7.3<L>      26 Feb 2022 08:52  Phos  4.0     02-25  Mg     1.4     02-25    TPro  5.0<L>  /  Alb  2.6<L>  /  TBili  0.7  /  DBili  x   /  AST  45<H>  /  ALT  21  /  AlkPhos  40  02-26    Lactate 1.0           02-26 @ 08:52    Lactate 1.1           02-26 @ 05:35    Lactate 1.4           02-26 @ 00:55          PT/INR - ( 26 Feb 2022 08:52 )   PT: 15.6 sec;   INR: 1.33 ratio         PTT - ( 26 Feb 2022 08:52 )  PTT:27.2 sec    .Blood Blood   No growth to date. -- 02-25 @ 18:34        Radiology: ***  Bedside ultrasound: ***    CENTRAL LINE: N/Y          DATE INSERTED:              REMOVE: Y/N  JUNIOR: N/Y                       DATE INSERTED:              REMOVE: Y/N  A-LINE: N/Y                       DATE INSERTED:              REMOVE: Y/N    GLOBAL ISSUE/BEST PRACTICE:  Analgesia:  Sedation:  CAM-ICU:   HOB elevation: yes  Stress ulcer prophylaxis:  VTE prophylaxis:  Glycemic control:  Nutrition:    CODE STATUS: ***    CRITICAL CARE TIME SPENT:  (Assessing presenting problems of acute illness, which pose high probability of life threatening deterioration or end organ damage/dysfunction, as well as medical decision making including initiating plan of care, reviewing data, reviewing radiologic exams, discussing with multidisciplinary team,  discussing goals of care with patient/family, and writing this note.  Non-inclusive of procedures performed)     HPI: 31 y/ F w. pmh of anxiety, ovarian cysts, presented to [facility] on [facility]/25/[facility]22 for scheduled elective D&C, hysteroscopy and laparoscopic R. ovarian cystectomy, OR courser c/b vascular injury to the aortic bifucation w/ 2L of BL repaired in the OR by vascular surgery, RP hematoma, intra OP pt was given 2U of PRBC and 4L of IVF, pt was left intubated and transfer to the MICU for HD monitoring, MICU courser c/b hypotension requiring an MTA activation pt was given additional 3U of PRBC, 1U of plts, 1FFP and emergent CTA done of chest/abd/pelvis w/ no active extravasation.       24 hour events: today pt was extubated and remains stable, repeat H/H tonight 10.6/31.1---->31.1/29.7, pt comfortable in bed w/out any complains at this time.    Review of Systems:  Constitutional: No fever, chills, fatigue  Neuro: No headache, numbness, weakness  Resp: No cough, wheezing, shortness of breath  CVS: No chest pain, palpitations, leg swelling  GI: No abdominal pain, nausea, vomiting, diarrhea   : No dysuria, frequency, incontinence  Skin: No itching, burning, rashes, or lesions   Msk: No joint pain or swelling  Psych: No depression, anxiety, mood swings    T(F): 99.3 (02-26-22 @ 23:33), Max: 100.2 (02-26-22 @ 04:00)  HR: 82 (02-27-22 @ 00:00) (75 - 106)  BP: 103/56 (02-27-22 @ 00:00) (90/51 - 116/74)  RR: 16 (02-27-22 @ 00:00) (15 - 28)  SpO2: 97% (02-27-22 @ 00:00) (89% - 100%)  Wt(kg): --    Mode: CPAP with PS, FiO2: 30, PEEP: 5, PS: 5  02-25-22 @ 07:01  -  02-26-22 @ 07:00  --------------------------------------------------------  IN: 4094.2 mL / OUT: 1765 mL / NET: 2329.2 mL    02-26-22 @ 07:01  -  02-27-22 @ 00:38  --------------------------------------------------------  IN: 2134.2 mL / OUT: 1675 mL / NET: 459.2 mL        CAPILLARY BLOOD GLUCOSE          I&O's Summary    25 Feb 2022 07:01  -  26 Feb 2022 07:00  --------------------------------------------------------  IN: 4094.2 mL / OUT: 1765 mL / NET: 2329.2 mL    26 Feb 2022 07:01  -  27 Feb 2022 00:38  --------------------------------------------------------  IN: 2134.2 mL / OUT: 1675 mL / NET: 459.2 mL        Physical Exam:   Gen: Comfortable in bed in NAD  Neuro: AAOx3  HEENT: PERRL  Resp: CTA b/l  CVS: +RRR  Abd: soft, ND, incision site c/d/i  Ext: no edema   Skin: warm/dry    Meds:  piperacillin/tazobactam IVPB.. IV Intermittent      HYDROmorphone  Injectable IV Push PRN  HYDROmorphone  Injectable IV Push PRN        lactated ringers. IV Continuous      chlorhexidine 2% Cloths Topical                              10.3   14.93 )-----------( 169      ( 27 Feb 2022 00:23 )             29.7       02-26    141  |  110<H>  |  8   ----------------------------<  122<H>  3.7   |  26  |  0.67    Ca    7.3<L>      26 Feb 2022 08:52  Phos  4.0     02-25  Mg     1.4     02-25    TPro  5.0<L>  /  Alb  2.6<L>  /  TBili  0.7  /  DBili  x   /  AST  45<H>  /  ALT  21  /  AlkPhos  40  02-26    Lactate 1.0           02-26 @ 08:52    Lactate 1.1           02-26 @ 05:35    Lactate 1.4           02-26 @ 00:55          PT/INR - ( 26 Feb 2022 08:52 )   PT: 15.6 sec;   INR: 1.33 ratio         PTT - ( 26 Feb 2022 08:52 )  PTT:27.2 sec    .Blood Blood   No growth to date. -- 02-25 @ 18:34        Radiology:     < from: CT Angio Abdomen and Pelvis w/ IV Cont (02.25.22 @ 15:28) >    ACC: [id number] EXAM:  CT ANGIO ABD PELV (W)AW IC                        ACC: [id number] EXAM:  CT ANGIO CHEST PULM ART [facility]                          PROCEDURE DATE:  02/25/2022          INTERPRETATION:  CLINICAL INFORMATION: Intraoperative aortic injury with   primary repair following right ovarian cystectomy.    COMPARISON: None.    CONTRAST/COMPLICATIONS:  IV Contrast: Omnipaque 350 (accession [id number]), IV contrast documented   in associated exam (accession [id number])  90 cc administered (accession   [id number]), 0 cc administered (accession [id number])   10 cc discarded   (accession [id number]), 0 cc discarded (accession [id number])  Oral Contrast: NONE  Complications: None reported at time of study completion    PROCEDURE:  CT Angiography of the chest and abdomen was performed followed by portal   venous phase imaging of the Abdomen and Pelvis.  Sagittal and coronal reformats were performed as well as 3D (MIP)   reconstructions.    FINDINGS:    Metallic streak artifact related to patient's spinal fusion hardware   degrades image quality limiting evaluation.    CHEST:    LUNGS AND LARGE AIRWAYS: PLEURA:  Endotracheal tube, tip above the maritza.  The central airways are patent.    Mild dependent bibasilar atelectasis.    VESSELS: No acute pulmonary embolism noted in the main, central right or   left pulmonary arteries.    HEART: Heart size is normal. No pericardial effusion.    MEDIASTINUM AND HERO: No lymphadenopathy.    CHEST WALL AND LOWER NECK: Within normal limits.    ABDOMEN AND PELVIS:    LIVER: Within normal limits.  BILE DUCTS: Normal caliber.  GALLBLADDER: Within normal limits.  SPLEEN: Within normal limits.  PANCREAS: Within normal limits.  ADRENALS: Within normal limits.  KIDNEYS/URETERS:  Left perinephric hemorrhage andstranding.  Left kidney is displaced anteriorly by large left retroperitoneal   hematoma.  No hydronephrosis.    BLADDER: Patel catheter in decompressed bladder.  Air within the perivesical, extraperitoneal soft tissues.  REPRODUCTIVE ORGANS:  Low-density thickening of the endometrium.  Small amount of complex free fluid within the pelvis.    BOWEL: Nasogastric tube within the stomach which is mildly distended.  Colonic fecal retention.  No bowel obstruction.   Appendix normal.  PERITONEUM: Smallamount of complex free fluid/hemorrhage.  Small amount of free intraperitoneal air.    VESSELS:  Surgical clips are present at the infrarenal abdominal aorta.  The aorta is normal in caliber.  No extraluminal extravasation of contrast is noted.  The superior and inferior mesenteric arteries are patent.  The bilateral renal arteries are patent.  The bilateral common iliac arteries are patent.    RETROPERITONEUM/LYMPH NODES:  Large left retroperitoneal hematoma measuring approximately  9 x 7.5 x 15  cm. This displaces the left kidney anteriorly laterally and extends into   the pararenal space superiorly and left psoas musculature inferiorly.    ABDOMINAL WALL: Air within the subcutaneous soft tissues.    BONES:  Posterior spinal fusion T4-L1 with bilateral spinal sergio and pedicle screw   instrumentation.    IMPRESSION:    Large left retroperitoneal hematoma, which is displacing the kidney   anterolaterally.  No extravasation of contrast to suggest active bleeding.    Findings discussed withDr. Valiente at the time of interpretation on   2/25/2022.    Other findings as discussed above.    --- End of Report ---      KATIA ROTH MD; Attending Radiologist  This document has been electronically signed. Feb 25 2022  4:03PM    < end of copied text >      GLOBAL ISSUE/BEST PRACTICE:  Analgesia: Y  Sedation: N  CAM-ICU: n/a  HOB elevation: Y  Stress ulcer prophylaxis: Y  VTE prophylaxis: N  Glycemic control: Y  Nutrition: N    CODE STATUS: FULL CODE

## 2022-02-27 NOTE — PROGRESS NOTE ADULT - ATTENDING COMMENTS
31F h/o anxiety, ovarian cyst POD#2 s/p laparoscopic ovarian cystectomy c/b aortic injury with L RP hematoma s/p MTP with total 5unit pRBC, 1plt, 1FFP, extubated yesterday and overall much improved.      Neuro: awake and alert; pain control with dilaudid as needed   CV: shock state resolved 2/2 MTP/resuscitation, no acute issues; can dc IVF once tolerating PO  Pulm: extubated successfully yesterday, stable on minimal NC - wean as able  - encourage incentive spirometry  GI: dc NGT and start CLD, advance as tolerates  Renal: maintaining adequate UOP, can dc evans today   - monitor strict I/Os, electrolytes  ID: continue zosyn (#3/7) for RP hematoma; BCx NGTD    Heme: acute blood loss anemia s/p MTP, received total 5unit pRBC, 1 plt, 1 FFP  - holding chemical DVT ppx, venodynes in place - trend CBC q6hr, if remains stable can start HSQ   OOBTC    D/w Dr Valiente, pt and .

## 2022-02-27 NOTE — PROGRESS NOTE ADULT - ASSESSMENT
Patient is a 30 yo female with a pmh of missed  and ovarian cyst who is being admitted to ICU post operatively from a D&C hysteroscopy and laparoscopic right ovarian cystectomy that was complicated by injury to the aorta resulting in open approach repair. Patient being admitted to ICU with hypovolemic shock, anemia with acute blood loss, retroperitoneal bleed and leukocytosis. Patient became hypotensive given hemorrhagic shock, MTP initiated, 24 hr total 5 prbc, 1 ffp, 1 platelet.  Plan:  Neuro:   - off sedation  - not paralyzed  - awake and intubated with plans to extubate  CV  - HD stable off pressure support   - lactate 1.1  - appears hypovolemic shock resolved  Respiratory  - on full vent support with plans to extubate today  - Actively titrating fio2 to maintain spo2 >92%  - AC /  GI  - open approach for primary repair, abdomen open for substantial period of time, concern for ileus  - NPO, NGT  - monitor for abd compartment syndrome  - bladder pressure 13 mm hg this AM  - CT abd: RP bleed appears to be contained, will order repeat CT for today  - GYN following case    - evans   - monitor electrolytes  - good urine output  - decrease LR to 100 cc/hr  - low phos this AM, repleated  Heme  - h/h appears to be trending down slowly  - f/u repeat h/h 1 pm  - Transfuse if hgb <7 or if symptomatic.  - no DVT prophylaxis  ID  - D/t retroperitoneal bleed and extensive operation  - empiric abx coverage per GYN’s recommendation  - started on zosyn  - BC pending  Lines  - A line and evans catheter inserted      Patient is a 30 yo female with a pmh of missed  and ovarian cyst who is being admitted to ICU post operatively from a D&C hysteroscopy and laparoscopic right ovarian cystectomy that was complicated by injury to the aorta resulting in open approach repair. Patient being admitted to ICU with hypovolemic shock, anemia with acute blood loss, retroperitoneal bleed and leukocytosis. Patient became hypotensive given hemorrhagic shock, MTP initiated, 24 hr total 5 prbc, 1 ffp, 1 platelet.  Plan:  Neuro:   - off fentanyl and precedex  - s/p extubation    - Dilauded and IV tylenol for pain control   CV  - HD stable off pressure support   - lactate 1.0  - appears hypovolemic shock resolved  - 100  Respiratory  - on full vent support with plans to extubate today  - Actively titrating fio2 to maintain spo2 >92%  - AC /  GI  - open approach for primary repair, abdomen open for substantial period of time, concern for ileus  - NPO, NGT  - monitor for abd compartment syndrome  - bladder pressure 13 mm hg this AM  - CT abd: RP bleed appears to be contained, will order repeat CT for today  - GYN following case    - evans   - monitor electrolytes  - good urine output  - decrease LR to 100 cc/hr  - low phos this AM, repleated  Heme  - h/h appears to be trending down slowly  - f/u repeat h/h 1 pm  - Transfuse if hgb <7 or if symptomatic.  - no DVT prophylaxis  ID  - D/t retroperitoneal bleed and extensive operation  - empiric abx coverage per GYN’s recommendation  - started on zosyn  - BC pending  Lines  - A line and evans catheter inserted      Patient is a 32 yo female with a pmh of missed  and ovarian cyst who is being admitted to ICU post operatively from a D&C hysteroscopy and laparoscopic right ovarian cystectomy that was complicated by injury to the aorta resulting in open approach repair. Patient being admitted to ICU with hypovolemic shock, anemia with acute blood loss, retroperitoneal bleed and leukocytosis. Patient became hypotensive given hemorrhagic shock, MTP initiated, 24 hr total 5 prbc, 1 ffp, 1 platelet.  Plan:  Neuro:   - off fentanyl and precedex  - s/p extubation    - Dilauded and IV tylenol for pain control   CV  - HD stable off pressure support   - lactate 1.0  - appears hypovolemic shock resolved  - 100 cc LR  Respiratory  - s/p extubation yd  - use supplemental O2 too maintain sp O2 >92%  GI  - open approach for primary repair, abdomen open for substantial period of time, concern for ileus  - remove NGT today  - start clear liquid diet, advance as tolerated  - monitor for abd compartment syndrome  - bladder pressure 5 mm hg this AM  - CT abd: RP bleed appears to be contained, will order repeat CT for today  - GYN following case    - TOV today  - monitor electrolytes  - good urine output  - decrease LR to 100 cc/hr  - low phos this AM, repleated  Heme  - h/h appears to be trending down slowly  - f/u repeat h/h 4 pm  - Transfuse if hgb <7 or if symptomatic.  - SCD for DVT prophylaxis  ID  - D/t retroperitoneal bleed and extensive operation  - empiric abx coverage per GYN’s recommendation  - started on zosyn  - BC NGTD  Lines  - no tubes no lines

## 2022-02-27 NOTE — PROGRESS NOTE ADULT - SUBJECTIVE AND OBJECTIVE BOX
INTERVAL HPI/OVERNIGHT EVENTS: Pt seen and examined at bedside.  Pt complains of abdominal pain this morning around 4:30 which improved with pain medication  She denies further episodes of nausea, emesis, severe abdominal pain     MEDICATIONS  (STANDING):  chlorhexidine 2% Cloths 1 Application(s) Topical <User Schedule>  lactated ringers. 1000 milliLiter(s) (100 mL/Hr) IV Continuous <Continuous>  piperacillin/tazobactam IVPB.. 3.375 Gram(s) IV Intermittent every 8 hours    MEDICATIONS  (PRN):  HYDROmorphone  Injectable 0.5 milliGRAM(s) IV Push every 4 hours PRN Moderate Pain (4 - 6)  HYDROmorphone  Injectable 1 milliGRAM(s) IV Push every 6 hours PRN Severe Pain (7 - 10)      Vital Signs Last 24 Hrs  T(C): 37.4 (27 Feb 2022 06:00), Max: 37.7 (26 Feb 2022 10:00)  T(F): 99.3 (27 Feb 2022 06:00), Max: 99.8 (26 Feb 2022 10:00)  HR: 85 (27 Feb 2022 06:00) (75 - 100)  BP: 102/58 (27 Feb 2022 06:00) (90/51 - 113/58)  BP(mean): 75 (27 Feb 2022 06:00) (65 - 83)  RR: 18 (27 Feb 2022 06:00) (15 - 28)  SpO2: 97% (27 Feb 2022 06:00) (89% - 100%)    PHYSICAL EXAM:    GA: NAD, A+0 x 3  Abd: ( + ) BS, soft, nontender, nondistended, no rebound or guarding,   Incision: clean, dry and intact; steri-strips in place  : no vaginal bleeding   Patel: clear adequate urine in place      LABS:                        10.3   14.93 )-----------( 169      ( 27 Feb 2022 00:23 )             29.7     02-26    141  |  110<H>  |  8   ----------------------------<  122<H>  3.7   |  26  |  0.67    Ca    7.3<L>      26 Feb 2022 08:52  Phos  4.0     02-25  Mg     1.4     02-25    TPro  5.0<L>  /  Alb  2.6<L>  /  TBili  0.7  /  DBili  x   /  AST  45<H>  /  ALT  21  /  AlkPhos  40  02-26    PT/INR - ( 26 Feb 2022 08:52 )   PT: 15.6 sec;   INR: 1.33 ratio         PTT - ( 26 Feb 2022 08:52 )  PTT:27.2 sec      RADIOLOGY & ADDITIONAL TESTS:

## 2022-02-28 LAB
ALBUMIN SERPL ELPH-MCNC: 2.6 G/DL — LOW (ref 3.3–5)
ALP SERPL-CCNC: 49 U/L — SIGNIFICANT CHANGE UP (ref 40–120)
ALT FLD-CCNC: 20 U/L — SIGNIFICANT CHANGE UP (ref 12–78)
ANION GAP SERPL CALC-SCNC: 5 MMOL/L — SIGNIFICANT CHANGE UP (ref 5–17)
AST SERPL-CCNC: 46 U/L — HIGH (ref 15–37)
BASOPHILS # BLD AUTO: 0.02 K/UL — SIGNIFICANT CHANGE UP (ref 0–0.2)
BASOPHILS # BLD AUTO: 0.03 K/UL — SIGNIFICANT CHANGE UP (ref 0–0.2)
BASOPHILS NFR BLD AUTO: 0.2 % — SIGNIFICANT CHANGE UP (ref 0–2)
BASOPHILS NFR BLD AUTO: 0.3 % — SIGNIFICANT CHANGE UP (ref 0–2)
BILIRUB SERPL-MCNC: 0.5 MG/DL — SIGNIFICANT CHANGE UP (ref 0.2–1.2)
BUN SERPL-MCNC: 7 MG/DL — SIGNIFICANT CHANGE UP (ref 7–23)
CALCIUM SERPL-MCNC: 7.9 MG/DL — LOW (ref 8.5–10.1)
CHLORIDE SERPL-SCNC: 109 MMOL/L — HIGH (ref 96–108)
CO2 SERPL-SCNC: 26 MMOL/L — SIGNIFICANT CHANGE UP (ref 22–31)
CREAT SERPL-MCNC: 0.6 MG/DL — SIGNIFICANT CHANGE UP (ref 0.5–1.3)
EOSINOPHIL # BLD AUTO: 0.06 K/UL — SIGNIFICANT CHANGE UP (ref 0–0.5)
EOSINOPHIL # BLD AUTO: 0.06 K/UL — SIGNIFICANT CHANGE UP (ref 0–0.5)
EOSINOPHIL NFR BLD AUTO: 0.6 % — SIGNIFICANT CHANGE UP (ref 0–6)
EOSINOPHIL NFR BLD AUTO: 0.7 % — SIGNIFICANT CHANGE UP (ref 0–6)
GLUCOSE SERPL-MCNC: 89 MG/DL — SIGNIFICANT CHANGE UP (ref 70–99)
HCT VFR BLD CALC: 28.7 % — LOW (ref 34.5–45)
HCT VFR BLD CALC: 29.5 % — LOW (ref 34.5–45)
HGB BLD-MCNC: 10 G/DL — LOW (ref 11.5–15.5)
HGB BLD-MCNC: 9.6 G/DL — LOW (ref 11.5–15.5)
IMM GRANULOCYTES NFR BLD AUTO: 0.4 % — SIGNIFICANT CHANGE UP (ref 0–1.5)
IMM GRANULOCYTES NFR BLD AUTO: 0.4 % — SIGNIFICANT CHANGE UP (ref 0–1.5)
LYMPHOCYTES # BLD AUTO: 1.58 K/UL — SIGNIFICANT CHANGE UP (ref 1–3.3)
LYMPHOCYTES # BLD AUTO: 1.65 K/UL — SIGNIFICANT CHANGE UP (ref 1–3.3)
LYMPHOCYTES # BLD AUTO: 16.5 % — SIGNIFICANT CHANGE UP (ref 13–44)
LYMPHOCYTES # BLD AUTO: 19 % — SIGNIFICANT CHANGE UP (ref 13–44)
MAGNESIUM SERPL-MCNC: 2.1 MG/DL — SIGNIFICANT CHANGE UP (ref 1.6–2.6)
MCHC RBC-ENTMCNC: 29.7 PG — SIGNIFICANT CHANGE UP (ref 27–34)
MCHC RBC-ENTMCNC: 29.9 PG — SIGNIFICANT CHANGE UP (ref 27–34)
MCHC RBC-ENTMCNC: 33.4 GM/DL — SIGNIFICANT CHANGE UP (ref 32–36)
MCHC RBC-ENTMCNC: 33.9 GM/DL — SIGNIFICANT CHANGE UP (ref 32–36)
MCV RBC AUTO: 88.1 FL — SIGNIFICANT CHANGE UP (ref 80–100)
MCV RBC AUTO: 88.9 FL — SIGNIFICANT CHANGE UP (ref 80–100)
MONOCYTES # BLD AUTO: 0.58 K/UL — SIGNIFICANT CHANGE UP (ref 0–0.9)
MONOCYTES # BLD AUTO: 0.64 K/UL — SIGNIFICANT CHANGE UP (ref 0–0.9)
MONOCYTES NFR BLD AUTO: 6.4 % — SIGNIFICANT CHANGE UP (ref 2–14)
MONOCYTES NFR BLD AUTO: 7 % — SIGNIFICANT CHANGE UP (ref 2–14)
NEUTROPHILS # BLD AUTO: 6.03 K/UL — SIGNIFICANT CHANGE UP (ref 1.8–7.4)
NEUTROPHILS # BLD AUTO: 7.55 K/UL — HIGH (ref 1.8–7.4)
NEUTROPHILS NFR BLD AUTO: 72.7 % — SIGNIFICANT CHANGE UP (ref 43–77)
NEUTROPHILS NFR BLD AUTO: 75.8 % — SIGNIFICANT CHANGE UP (ref 43–77)
NRBC # BLD: 0 /100 WBCS — SIGNIFICANT CHANGE UP (ref 0–0)
NRBC # BLD: 0 /100 WBCS — SIGNIFICANT CHANGE UP (ref 0–0)
PHOSPHATE SERPL-MCNC: 2 MG/DL — LOW (ref 2.5–4.5)
PLATELET # BLD AUTO: 182 K/UL — SIGNIFICANT CHANGE UP (ref 150–400)
PLATELET # BLD AUTO: 211 K/UL — SIGNIFICANT CHANGE UP (ref 150–400)
POTASSIUM SERPL-MCNC: 3.8 MMOL/L — SIGNIFICANT CHANGE UP (ref 3.5–5.3)
POTASSIUM SERPL-SCNC: 3.8 MMOL/L — SIGNIFICANT CHANGE UP (ref 3.5–5.3)
PROT SERPL-MCNC: 5.6 G/DL — LOW (ref 6–8.3)
RBC # BLD: 3.23 M/UL — LOW (ref 3.8–5.2)
RBC # BLD: 3.35 M/UL — LOW (ref 3.8–5.2)
RBC # FLD: 13.1 % — SIGNIFICANT CHANGE UP (ref 10.3–14.5)
RBC # FLD: 13.2 % — SIGNIFICANT CHANGE UP (ref 10.3–14.5)
SODIUM SERPL-SCNC: 140 MMOL/L — SIGNIFICANT CHANGE UP (ref 135–145)
WBC # BLD: 8.3 K/UL — SIGNIFICANT CHANGE UP (ref 3.8–10.5)
WBC # BLD: 9.97 K/UL — SIGNIFICANT CHANGE UP (ref 3.8–10.5)
WBC # FLD AUTO: 8.3 K/UL — SIGNIFICANT CHANGE UP (ref 3.8–10.5)
WBC # FLD AUTO: 9.97 K/UL — SIGNIFICANT CHANGE UP (ref 3.8–10.5)

## 2022-02-28 PROCEDURE — 99233 SBSQ HOSP IP/OBS HIGH 50: CPT | Mod: GC

## 2022-02-28 RX ORDER — POTASSIUM PHOSPHATE, MONOBASIC POTASSIUM PHOSPHATE, DIBASIC 236; 224 MG/ML; MG/ML
15 INJECTION, SOLUTION INTRAVENOUS ONCE
Refills: 0 | Status: DISCONTINUED | OUTPATIENT
Start: 2022-02-28 | End: 2022-02-28

## 2022-02-28 RX ORDER — ACETAMINOPHEN 500 MG
1000 TABLET ORAL EVERY 8 HOURS
Refills: 0 | Status: DISCONTINUED | OUTPATIENT
Start: 2022-02-28 | End: 2022-03-03

## 2022-02-28 RX ORDER — SENNA PLUS 8.6 MG/1
2 TABLET ORAL AT BEDTIME
Refills: 0 | Status: DISCONTINUED | OUTPATIENT
Start: 2022-02-28 | End: 2022-03-03

## 2022-02-28 RX ORDER — HEPARIN SODIUM 5000 [USP'U]/ML
5000 INJECTION INTRAVENOUS; SUBCUTANEOUS EVERY 8 HOURS
Refills: 0 | Status: DISCONTINUED | OUTPATIENT
Start: 2022-02-28 | End: 2022-03-03

## 2022-02-28 RX ORDER — HEPARIN SODIUM 5000 [USP'U]/ML
5000 INJECTION INTRAVENOUS; SUBCUTANEOUS EVERY 12 HOURS
Refills: 0 | Status: DISCONTINUED | OUTPATIENT
Start: 2022-02-28 | End: 2022-02-28

## 2022-02-28 RX ORDER — IBUPROFEN 200 MG
400 TABLET ORAL EVERY 6 HOURS
Refills: 0 | Status: DISCONTINUED | OUTPATIENT
Start: 2022-02-28 | End: 2022-03-01

## 2022-02-28 RX ORDER — ACETAMINOPHEN 500 MG
650 TABLET ORAL EVERY 6 HOURS
Refills: 0 | Status: DISCONTINUED | OUTPATIENT
Start: 2022-02-28 | End: 2022-02-28

## 2022-02-28 RX ORDER — POTASSIUM PHOSPHATE, MONOBASIC POTASSIUM PHOSPHATE, DIBASIC 236; 224 MG/ML; MG/ML
30 INJECTION, SOLUTION INTRAVENOUS ONCE
Refills: 0 | Status: COMPLETED | OUTPATIENT
Start: 2022-02-28 | End: 2022-02-28

## 2022-02-28 RX ORDER — POLYETHYLENE GLYCOL 3350 17 G/17G
17 POWDER, FOR SOLUTION ORAL DAILY
Refills: 0 | Status: DISCONTINUED | OUTPATIENT
Start: 2022-02-28 | End: 2022-03-03

## 2022-02-28 RX ORDER — HYDROMORPHONE HYDROCHLORIDE 2 MG/ML
1 INJECTION INTRAMUSCULAR; INTRAVENOUS; SUBCUTANEOUS EVERY 4 HOURS
Refills: 0 | Status: DISCONTINUED | OUTPATIENT
Start: 2022-02-28 | End: 2022-03-02

## 2022-02-28 RX ADMIN — PIPERACILLIN AND TAZOBACTAM 25 GRAM(S): 4; .5 INJECTION, POWDER, LYOPHILIZED, FOR SOLUTION INTRAVENOUS at 13:30

## 2022-02-28 RX ADMIN — Medication 400 MILLIGRAM(S): at 17:46

## 2022-02-28 RX ADMIN — Medication 400 MILLIGRAM(S): at 18:16

## 2022-02-28 RX ADMIN — PIPERACILLIN AND TAZOBACTAM 25 GRAM(S): 4; .5 INJECTION, POWDER, LYOPHILIZED, FOR SOLUTION INTRAVENOUS at 20:56

## 2022-02-28 RX ADMIN — HYDROMORPHONE HYDROCHLORIDE 1 MILLIGRAM(S): 2 INJECTION INTRAMUSCULAR; INTRAVENOUS; SUBCUTANEOUS at 01:56

## 2022-02-28 RX ADMIN — Medication 650 MILLIGRAM(S): at 08:08

## 2022-02-28 RX ADMIN — HYDROMORPHONE HYDROCHLORIDE 1 MILLIGRAM(S): 2 INJECTION INTRAMUSCULAR; INTRAVENOUS; SUBCUTANEOUS at 20:56

## 2022-02-28 RX ADMIN — HYDROMORPHONE HYDROCHLORIDE 0.5 MILLIGRAM(S): 2 INJECTION INTRAMUSCULAR; INTRAVENOUS; SUBCUTANEOUS at 00:00

## 2022-02-28 RX ADMIN — CHLORHEXIDINE GLUCONATE 1 APPLICATION(S): 213 SOLUTION TOPICAL at 05:03

## 2022-02-28 RX ADMIN — POLYETHYLENE GLYCOL 3350 17 GRAM(S): 17 POWDER, FOR SOLUTION ORAL at 11:17

## 2022-02-28 RX ADMIN — HYDROMORPHONE HYDROCHLORIDE 0.5 MILLIGRAM(S): 2 INJECTION INTRAMUSCULAR; INTRAVENOUS; SUBCUTANEOUS at 07:15

## 2022-02-28 RX ADMIN — HYDROMORPHONE HYDROCHLORIDE 0.5 MILLIGRAM(S): 2 INJECTION INTRAMUSCULAR; INTRAVENOUS; SUBCUTANEOUS at 06:27

## 2022-02-28 RX ADMIN — HYDROMORPHONE HYDROCHLORIDE 1 MILLIGRAM(S): 2 INJECTION INTRAMUSCULAR; INTRAVENOUS; SUBCUTANEOUS at 10:01

## 2022-02-28 RX ADMIN — Medication 1000 MILLIGRAM(S): at 14:55

## 2022-02-28 RX ADMIN — Medication 400 MILLIGRAM(S): at 11:47

## 2022-02-28 RX ADMIN — HYDROMORPHONE HYDROCHLORIDE 1 MILLIGRAM(S): 2 INJECTION INTRAMUSCULAR; INTRAVENOUS; SUBCUTANEOUS at 10:31

## 2022-02-28 RX ADMIN — Medication 650 MILLIGRAM(S): at 08:38

## 2022-02-28 RX ADMIN — Medication 400 MILLIGRAM(S): at 11:17

## 2022-02-28 RX ADMIN — HYDROMORPHONE HYDROCHLORIDE 1 MILLIGRAM(S): 2 INJECTION INTRAMUSCULAR; INTRAVENOUS; SUBCUTANEOUS at 02:30

## 2022-02-28 RX ADMIN — HEPARIN SODIUM 5000 UNIT(S): 5000 INJECTION INTRAVENOUS; SUBCUTANEOUS at 21:19

## 2022-02-28 RX ADMIN — SENNA PLUS 2 TABLET(S): 8.6 TABLET ORAL at 21:19

## 2022-02-28 RX ADMIN — HYDROMORPHONE HYDROCHLORIDE 1 MILLIGRAM(S): 2 INJECTION INTRAMUSCULAR; INTRAVENOUS; SUBCUTANEOUS at 21:26

## 2022-02-28 RX ADMIN — Medication 1000 MILLIGRAM(S): at 15:25

## 2022-02-28 RX ADMIN — PIPERACILLIN AND TAZOBACTAM 25 GRAM(S): 4; .5 INJECTION, POWDER, LYOPHILIZED, FOR SOLUTION INTRAVENOUS at 05:03

## 2022-02-28 RX ADMIN — POTASSIUM PHOSPHATE, MONOBASIC POTASSIUM PHOSPHATE, DIBASIC 83.33 MILLIMOLE(S): 236; 224 INJECTION, SOLUTION INTRAVENOUS at 11:17

## 2022-02-28 RX ADMIN — HEPARIN SODIUM 5000 UNIT(S): 5000 INJECTION INTRAVENOUS; SUBCUTANEOUS at 13:29

## 2022-02-28 NOTE — PROGRESS NOTE ADULT - SUBJECTIVE AND OBJECTIVE BOX
Patient is a 31y old  Female who presents with a chief complaint of elective surgery (2022 11:04)      BRIEF HOSPITAL COURSE: 30 y/o female with pmhx of anxiety, ovarian cyst admitted on  for elective ovarian cystectomy with operative course complicated by aortic injury with 2 liter EBL, s/p MTP in ICU with 5 prbc, 1 ffp, 1 plt.    Events last 24 hours: restarted on dvt prophylaxis, receiving motrin prn for pain. f/u cbc this afternoon stable. patient states her pain is improving. c/o increased sputum.     PAST MEDICAL & SURGICAL HISTORY:  Moderate anxiety    Missed   2021    Ectopic pregnancy  2022    History of spinal fusion for scoliosis          Review of Systems:  CONSTITUTIONAL: No fever, chills, or fatigue  EYES: No eye pain, visual disturbances, or discharge  ENMT:  No difficulty hearing, tinnitus, vertigo; No sinus or throat pain  NECK: No pain or stiffness  RESPIRATORY: No cough, wheezing, chills or hemoptysis; No shortness of breath. +increase sputum  CARDIOVASCULAR: No chest pain, palpitations, dizziness, or leg swelling  GASTROINTESTINAL: No abdominal or epigastric pain. No nausea, vomiting, or hematemesis; No diarrhea or constipation. No melena or hematochezia.  GENITOURINARY: No dysuria, frequency, hematuria, or incontinence  NEUROLOGICAL: No headaches, memory loss, loss of strength, numbness, or tremors  SKIN: No itching, burning, rashes, or lesions   MUSCULOSKELETAL: No joint pain or swelling; No muscle, back, or extremity pain  PSYCHIATRIC: No depression, anxiety, mood swings, or difficulty sleeping      Medications:  piperacillin/tazobactam IVPB.. 3.375 Gram(s) IV Intermittent every 8 hours      guaiFENesin Oral Liquid (Sugar-Free) 100 milliGRAM(s) Oral every 6 hours PRN    acetaminophen     Tablet .. 1000 milliGRAM(s) Oral every 8 hours PRN  HYDROmorphone   Tablet 1 milliGRAM(s) Oral every 4 hours PRN  HYDROmorphone  Injectable 1 milliGRAM(s) IV Push every 6 hours PRN  ibuprofen  Tablet. 400 milliGRAM(s) Oral every 6 hours      heparin   Injectable 5000 Unit(s) SubCutaneous every 8 hours    polyethylene glycol 3350 17 Gram(s) Oral daily  senna 2 Tablet(s) Oral at bedtime            chlorhexidine 2% Cloths 1 Application(s) Topical <User Schedule>            ICU Vital Signs Last 24 Hrs  T(C): 36.7 (2022 16:00), Max: 37.5 (2022 01:00)  T(F): 98.1 (2022 16:00), Max: 99.5 (2022 01:00)  HR: 85 (2022 20:00) (72 - 89)  BP: 118/72 (2022 20:00) (102/62 - 123/76)  BP(mean): 90 (2022 20:00) (76 - 95)  ABP: --  ABP(mean): --  RR: 23 (2022 20:00) (11 - 25)  SpO2: 94% (2022 20:00) (90% - 97%)          I&O's Detail    2022 07:01  -  2022 07:00  --------------------------------------------------------  IN:    IV PiggyBack: 200 mL    Lactated Ringers: 1100 mL    Oral Fluid: 1070 mL  Total IN: 2370 mL    OUT:    Indwelling Catheter - Urethral (mL): 700 mL    Voided (mL): 2 mL  Total OUT: 702 mL    Total NET: 1668 mL      2022 07:01  -  2022 20:17  --------------------------------------------------------  IN:    IV PiggyBack: 100 mL    IV PiggyBack: 499.8 mL    Oral Fluid: 720 mL  Total IN: 1319.8 mL    OUT:    Voided (mL): 1270 mL  Total OUT: 1270 mL    Total NET: 49.8 mL            LABS:                        10.0   8.30  )-----------( 211      ( 2022 16:27 )             29.5     02-28    140  |  109<H>  |  7   ----------------------------<  89  3.8   |  26  |  0.60    Ca    7.9<L>      2022 07:16  Phos  2.0       Mg     2.1         TPro  5.6<L>  /  Alb  2.6<L>  /  TBili  0.5  /  DBili  x   /  AST  46<H>  /  ALT  20  /  AlkPhos  49            CAPILLARY BLOOD GLUCOSE      POCT Blood Glucose.: 97 mg/dL (2022 06:43)    PT/INR - ( 2022 07:54 )   PT: 14.6 sec;   INR: 1.25 ratio         PTT - ( 2022 07:54 )  PTT:26.3 sec    CULTURES:  Culture Results:   No growth to date. ( @ 18:34)  Culture Results:   No growth to date. ( @ 18:34)      Physical Examination:    General: No acute distress.      HEENT: Pupils equal, reactive to light.  Symmetric.    PULM: Clear to auscultation bilaterally, no significant sputum production    NECK: Supple, no lymphadenopathy, trachea midline    CVS: Regular rate and rhythm, no murmurs, rubs, or gallops    ABD: Soft, nondistended, nontender, normoactive bowel sounds, no masses. surgical site dressed, clean and dry    EXT: No edema, nontender    SKIN: Warm and well perfused, no rashes noted.    NEURO: Alert, oriented, interactive, nonfocal    DEVICES:     RADIOLOGY:  IMPRESSION:    Large left retroperitoneal hematoma, which is displacing the kidney   anterolaterally.  No extravasation of contrast to suggest active bleeding.    Findings discussed withDr. Valiente at the time of interpretation on   2022.    Other findings as discussed above.    --- End of Report ---            KATIA ROTH MD; Attending Radiologist  This document has been electronically signed. 2022  4:03PM

## 2022-02-28 NOTE — PROGRESS NOTE ADULT - ATTENDING COMMENTS
32 yo woman with Hx anxiety, ovarian cyst POD#3 s/p laparoscopic ovarian cystectomy complicated by aortic injury with L retroperitoneal hematoma s/p MTP with total 5unit pRBC/1plt/1FFP.  Continues to do well.      --add motrin 400mg q6h standing to help decrease narcotic requirements  continue tylenol for mild pain, change to PO dilaudid for moderate and continue IV dilaudid for severe pain  --remains hemodynamically stable  --stable from respiratory standpoint  incentive spirometer  robitussin for sputum  --normal renal function  --tolerating PO diet  bowel regimen started  --continue empiric zosyn in setting of RPB  --acute blood loss anemia stabilized  pm Hb improved  start DVT ppx with heparin SC  --OOB, ambulating  --plan discussed with gyn and vascular Sx  discussed with pt

## 2022-02-28 NOTE — PHARMACOTHERAPY INTERVENTION NOTE - COMMENTS
Patient post-op with pain medication, receiving medication frequently - discussed with ICU team, will convert tylenol for mild pain but increase dose to 1g, will convert moderate pain dilaudid to oral for longer duration of action, and continue severe pain regimen. Will start around the clock ibuprofen 400mg q6 (GYN Dr Valiente aware, discussed bleed risk following surgery) - ok Patient post-op with pain medication, receiving medication frequently - discussed with ICU team, will convert tylenol for mild pain but increase dose to 1g, will convert moderate pain dilaudid to oral for longer duration of action, and continue severe pain regimen. Will start around the clock ibuprofen 400mg q6 (GYN Dr Valiente aware, discussed bleed risk following surgery) - ok    Patient last bowel movement 2/24, added senna and miralax

## 2022-02-28 NOTE — PROGRESS NOTE ADULT - SUBJECTIVE AND OBJECTIVE BOX
INTERVAL HPI/OVERNIGHT EVENTS: Pt seen and examined at bedside.  Pt complains of bloating  She denies nausea, vomiting, severe abdominal pain     MEDICATIONS  (STANDING):  chlorhexidine 2% Cloths 1 Application(s) Topical <User Schedule>  ibuprofen  Tablet. 400 milliGRAM(s) Oral every 6 hours  piperacillin/tazobactam IVPB.. 3.375 Gram(s) IV Intermittent every 8 hours  polyethylene glycol 3350 17 Gram(s) Oral daily  potassium phosphate IVPB 30 milliMole(s) IV Intermittent once  senna 2 Tablet(s) Oral at bedtime    MEDICATIONS  (PRN):  acetaminophen     Tablet .. 1000 milliGRAM(s) Oral every 8 hours PRN Mild Pain (1 - 3)  guaiFENesin Oral Liquid (Sugar-Free) 100 milliGRAM(s) Oral every 6 hours PRN secretion  HYDROmorphone   Tablet 1 milliGRAM(s) Oral every 4 hours PRN Moderate Pain (4 - 6)  HYDROmorphone  Injectable 1 milliGRAM(s) IV Push every 6 hours PRN Severe Pain (7 - 10)      Vital Signs Last 24 Hrs  T(C): 36.9 (28 Feb 2022 08:29), Max: 37.5 (28 Feb 2022 01:00)  T(F): 98.4 (28 Feb 2022 08:29), Max: 99.5 (28 Feb 2022 01:00)  HR: 84 (28 Feb 2022 09:00) (72 - 111)  BP: 121/75 (28 Feb 2022 09:00) (100/54 - 123/55)  BP(mean): 92 (28 Feb 2022 09:00) (73 - 93)  RR: 22 (28 Feb 2022 09:00) (11 - 32)  SpO2: 93% (28 Feb 2022 09:00) (86% - 97%)    PHYSICAL EXAM:    GA: NAD, A+0 x 3  Abd: ( + ) BS, soft, nontender, nondistended, no rebound or guarding,   Incision: clean, dry and intact; steri-strips in place  :  no bleeding         LABS:                        9.6    9.97  )-----------( 182      ( 28 Feb 2022 07:16 )             28.7     02-28    140  |  109<H>  |  7   ----------------------------<  89  3.8   |  26  |  0.60    Ca    7.9<L>      28 Feb 2022 07:16  Phos  2.0     02-28  Mg     2.1     02-28    TPro  5.6<L>  /  Alb  2.6<L>  /  TBili  0.5  /  DBili  x   /  AST  46<H>  /  ALT  20  /  AlkPhos  49  02-28    PT/INR - ( 27 Feb 2022 07:54 )   PT: 14.6 sec;   INR: 1.25 ratio         PTT - ( 27 Feb 2022 07:54 )  PTT:26.3 sec      RADIOLOGY & ADDITIONAL TESTS:

## 2022-02-28 NOTE — PROGRESS NOTE ADULT - ASSESSMENT
30 y/o female with pmhx of anxiety s/p ovarian cystectomy c/b ABLA and hemorrhagic shock due to left RP hematoma due to aortic injury.    - cbc stable. f/u in am unless clinical condition warrants  - analgesia prn with motrin, tylenol and dilaudid for severe pain  - encouraged incentive spirometry. ambulate as tolerated and transitioned to chair. able to expectorate on her own  - heparin for dvt prophylaxis

## 2022-02-28 NOTE — PROGRESS NOTE ADULT - SUBJECTIVE AND OBJECTIVE BOX
Patient is a 31y old  Female who presents with a chief complaint of elective surgery (26 Feb 2022 11:04)    24 hour events: ***    REVIEW OF SYSTEMS  Constitutional: No fever, chills, fatigue  Neuro: No headache, numbness, weakness  Resp: No cough, wheezing, shortness of breath  CVS: No chest pain, palpitations, leg swelling  GI: No abdominal pain, nausea, vomiting, diarrhea   : No dysuria, frequency, incontinence  Skin: No itching, burning, rashes, or lesions   Msk: No joint pain or swelling  Psych: No depression, anxiety, mood swings  Heme: No bleeding    T(F): 98.4 (02-28-22 @ 08:29), Max: 99.5 (02-28-22 @ 01:00)  HR: 75 (02-28-22 @ 07:00) (72 - 111)  BP: 107/68 (02-28-22 @ 07:00) (98/56 - 123/55)  RR: 15 (02-28-22 @ 07:00) (11 - 32)  SpO2: 95% (02-28-22 @ 07:00) (86% - 98%)  Wt(kg): --            I&O's Summary    02-27 @ 07:01  -  02-28 @ 07:00  --------------------------------------------------------  IN: 2370 mL / OUT: 702 mL / NET: 1668 mL      PHYSICAL EXAM  General:   CNS:   HEENT:   Resp:   CVS:   Abd:   Ext:   Skin:     MEDICATIONS  piperacillin/tazobactam IVPB.. IV Intermittent          acetaminophen     Tablet .. Oral PRN  HYDROmorphone  Injectable IV Push PRN  HYDROmorphone  Injectable IV Push PRN        polyethylene glycol 3350 Oral  senna Oral          chlorhexidine 2% Cloths Topical                            9.6    9.97  )-----------( 182      ( 28 Feb 2022 07:16 )             28.7       02-28    140  |  109<H>  |  7   ----------------------------<  89  3.8   |  26  |  0.60    Ca    7.9<L>      28 Feb 2022 07:16  Phos  2.0     02-28  Mg     2.1     02-28    TPro  5.6<L>  /  Alb  2.6<L>  /  TBili  0.5  /  DBili  x   /  AST  46<H>  /  ALT  20  /  AlkPhos  49  02-28          PT/INR - ( 27 Feb 2022 07:54 )   PT: 14.6 sec;   INR: 1.25 ratio         PTT - ( 27 Feb 2022 07:54 )  PTT:26.3 sec    .Blood Blood   No growth to date. -- 02-25 @ 18:34        Radiology: ***  Bedside lung ultrasound: ***  Bedside ECHO: ***    CENTRAL LINE: Y/N          DATE INSERTED:              REMOVE: Y/N  JUNIOR: Y/N                        DATE INSERTED:              REMOVE: Y/N  A-LINE: Y/N                       DATE INSERTED:              REMOVE: Y/N    GLOBAL ISSUE/BEST PRACTICE  Analgesia:   Sedation:   CAM-ICU:   HOB elevation: yes  Stress ulcer prophylaxis:   VTE prophylaxis:   Glycemic control:   Nutrition:     CODE STATUS: ***  GO discussion: Y       Patient is a 31y old  Female who presents with a chief complaint of elective surgery (26 Feb 2022 11:04)    24 hour events: no acute events    REVIEW OF SYSTEMS  Constitutional: No fever, chills, fatigue  Neuro: No headache, numbness, weakness  Resp: No wheezing, shortness of breath. +cough  CVS: No chest pain, palpitations, leg swelling  GI: No nausea, vomiting, diarrhea +abdominal pain, constipation  : No dysuria, frequency, incontinence  Skin: No itching, burning, rashes, or lesions   Msk: No joint pain or swelling  Psych: No depression, anxiety, mood swings  Heme: No bleeding    T(F): 98.4 (02-28-22 @ 08:29), Max: 99.5 (02-28-22 @ 01:00)  HR: 75 (02-28-22 @ 07:00) (72 - 111)  BP: 107/68 (02-28-22 @ 07:00) (98/56 - 123/55)  RR: 15 (02-28-22 @ 07:00) (11 - 32)  SpO2: 95% (02-28-22 @ 07:00) (86% - 98%)  Wt(kg): --            I&O's Summary    02-27 @ 07:01  -  02-28 @ 07:00  --------------------------------------------------------  IN: 2370 mL / OUT: 702 mL / NET: 1668 mL      PHYSICAL EXAM  General: NAD, lying in bed  CNS: alert, answers questions appropriately  HEENT: NCAT  Resp: clear breath sounds b/l  CVS: regular rate and rhythm, +s1s2, no murmurs, rubs, or gallops   Abd: soft, nontender, nondistended, x4,  no guarding, incision clean appearing with minimal staining of dressing  Ext: no clubbing, cyanosis or edema, +2 pedal pulses  Skin: warm and dry    MEDICATIONS  piperacillin/tazobactam IVPB.. IV Intermittent          acetaminophen     Tablet .. Oral PRN  HYDROmorphone  Injectable IV Push PRN  HYDROmorphone  Injectable IV Push PRN        polyethylene glycol 3350 Oral  senna Oral          chlorhexidine 2% Cloths Topical                            9.6    9.97  )-----------( 182      ( 28 Feb 2022 07:16 )             28.7       02-28    140  |  109<H>  |  7   ----------------------------<  89  3.8   |  26  |  0.60    Ca    7.9<L>      28 Feb 2022 07:16  Phos  2.0     02-28  Mg     2.1     02-28    TPro  5.6<L>  /  Alb  2.6<L>  /  TBili  0.5  /  DBili  x   /  AST  46<H>  /  ALT  20  /  AlkPhos  49  02-28          PT/INR - ( 27 Feb 2022 07:54 )   PT: 14.6 sec;   INR: 1.25 ratio         PTT - ( 27 Feb 2022 07:54 )  PTT:26.3 sec    .Blood Blood   No growth to date. -- 02-25 @ 18:34          CENTRAL LINE: N  JUNIOR: N          A-LINE: N       GLOBAL ISSUE/BEST PRACTICE  Analgesia: Y  Sedation: N  CAM-ICU: neg  HOB elevation: yes  Stress ulcer prophylaxis: yes  VTE prophylaxis: Y  Glycemic control: neg  Nutrition: NPO    CODE STATUS: FULL CODE       Patient is a 31y old  Female who presents with a chief complaint of elective surgery (26 Feb 2022 11:04)    24 hour events: no acute events  +flatus, no BM    REVIEW OF SYSTEMS  Constitutional: No fever, chills, fatigue  Neuro: No headache, numbness, weakness  Resp: No wheezing, shortness of breath. +cough  CVS: No chest pain, palpitations, leg swelling  GI: No nausea, vomiting, diarrhea +abdominal pain, constipation  : No dysuria, frequency, incontinence  Heme: No bleeding    T(F): 98.4 (02-28-22 @ 08:29), Max: 99.5 (02-28-22 @ 01:00)  HR: 75 (02-28-22 @ 07:00) (72 - 111)  BP: 107/68 (02-28-22 @ 07:00) (98/56 - 123/55)  RR: 15 (02-28-22 @ 07:00) (11 - 32)  SpO2: 95% (02-28-22 @ 07:00) (86% - 98%)  Wt(kg): --            I&O's Summary    02-27 @ 07:01  -  02-28 @ 07:00  --------------------------------------------------------  IN: 2370 mL / OUT: 702 mL / NET: 1668 mL      PHYSICAL EXAM  General: NAD, lying in bed  CNS: alert, answers questions appropriately  HEENT: NCAT  Resp: clear breath sounds b/l  CVS: regular rate and rhythm, +s1s2, no murmurs, rubs, or gallops   Abd: soft, nontender, nondistended, x4,  no guarding, incision clean appearing with minimal staining of dressing  Ext: no clubbing, cyanosis or edema, +2 pedal pulses  Skin: warm and dry    MEDICATIONS  piperacillin/tazobactam IVPB.. IV Intermittent          acetaminophen     Tablet .. Oral PRN  HYDROmorphone  Injectable IV Push PRN  HYDROmorphone  Injectable IV Push PRN        polyethylene glycol 3350 Oral  senna Oral          chlorhexidine 2% Cloths Topical                            9.6    9.97  )-----------( 182      ( 28 Feb 2022 07:16 )             28.7       02-28    140  |  109<H>  |  7   ----------------------------<  89  3.8   |  26  |  0.60    Ca    7.9<L>      28 Feb 2022 07:16  Phos  2.0     02-28  Mg     2.1     02-28    TPro  5.6<L>  /  Alb  2.6<L>  /  TBili  0.5  /  DBili  x   /  AST  46<H>  /  ALT  20  /  AlkPhos  49  02-28          PT/INR - ( 27 Feb 2022 07:54 )   PT: 14.6 sec;   INR: 1.25 ratio         PTT - ( 27 Feb 2022 07:54 )  PTT:26.3 sec    .Blood Blood   No growth to date. -- 02-25 @ 18:34          CENTRAL LINE: N  JUNIOR: N          A-LINE: N       GLOBAL ISSUE/BEST PRACTICE  Analgesia: Y  Sedation: N  CAM-ICU: neg  HOB elevation: yes  Stress ulcer prophylaxis: yes  VTE prophylaxis: Y  Glycemic control: neg  Nutrition: NPO    CODE STATUS: FULL CODE

## 2022-02-28 NOTE — PROGRESS NOTE ADULT - ASSESSMENT
32 yo POD #3 s/p D & C hysteroscopy, R ovarian Cystectomy, chromotubation, laparotomy for aortic repair and MTP s/p 5 prbcs  Patient doing well this morning   Vital signs stable - afebrile, normotensive   Patient tolerated regular diet   Voiding   Passing Flatus   Discussed with ICU team to transition to PO pain medication   Repeat CBC in the afternoon   SCDs in place and heparin to be started today   Encourage ambulation   Incentive spirometry at bedside

## 2022-02-28 NOTE — PROGRESS NOTE ADULT - ASSESSMENT
Patient is a 30 yo female with a pmh of missed  and ovarian cyst who is being admitted to ICU post operatively from a D&C hysteroscopy and laparoscopic right ovarian cystectomy that was complicated by injury to the aorta resulting in open approach repair. Patient being admitted to ICU with hypovolemic shock, anemia with acute blood loss, retroperitoneal bleed and leukocytosis. Patient became hypotensive given hemorrhagic shock, MTP initiated, 24 hr total 5 prbc, 1 ffp, 1 platelet.  Plan:  Neuro:   - off fentanyl and precedex  - s/p extubation    - Dilauded and IV tylenol for pain control   CV  - HD stable off pressure support   - lactate 1.0  - appears hypovolemic shock resolved  - 100 cc LR  Respiratory  - s/p extubation yd  - use supplemental O2 too maintain sp O2 >92%  GI  - open approach for primary repair, abdomen open for substantial period of time, concern for ileus  - remove NGT today  - start clear liquid diet, advance as tolerated  - monitor for abd compartment syndrome  - bladder pressure 5 mm hg this AM  - CT abd: RP bleed appears to be contained, will order repeat CT for today  - GYN following case    - TOV today  - monitor electrolytes  - good urine output  - decrease LR to 100 cc/hr  - low phos this AM, repleated  Heme  - h/h appears to be trending down slowly  - f/u repeat h/h 4 pm  - Transfuse if hgb <7 or if symptomatic.  - SCD for DVT prophylaxis  ID  - D/t retroperitoneal bleed and extensive operation  - empiric abx coverage per GYN’s recommendation  - started on zosyn  - BC NGTD  Lines  - no tubes no lines     Patient is a 30 yo female with a pmh of missed  and ovarian cyst who is being admitted to ICU post operatively from a D&C hysteroscopy and laparoscopic right ovarian cystectomy that was complicated by injury to the aorta resulting in open approach repair. Patient being admitted to ICU with hypovolemic shock, anemia with acute blood loss, retroperitoneal bleed and leukocytosis. Patient became hypotensive given hemorrhagic shock, MTP initiated, 24 hr total 5 prbc, 1 ffp, 1 platelet.  Plan:  Neuro  - 1 g tylenol q8h and 400 mg motrin q6h for mild pain  - 1g PO dilaudid for moderate pain  - 1g IV dilauded for severe pain  CV  - HD stable off pressure support, hypovolemic shock resolved   - stop IVF  Respiratory  - s/p extubation   - PRN Robitussin for cough and mucus   GI  - open approach for primary repair, abdomen open for substantial period of time, concern for ileus  - Regular diet  - LBM , start senna and miralax  - CT abd: RP bleed appears to be contained, will order repeat CT for today  - GYN following case    - monitor electrolytes  - good urine output, evans removed  - low phos this AM, repleated  Heme  - h/h appears to be equalizing   - f/u repeat h/h 4 pm  - Transfuse if hgb <7 or if symptomatic.  - start heparin q8h for DVT ppx  ID  - D/t retroperitoneal bleed and extensive operation  - empiric abx coverage per GYN’s recommendation  - started on zosyn  - BC NGTD  Lines  - no tubes no lines

## 2022-03-01 DIAGNOSIS — K66.1 HEMOPERITONEUM: ICD-10-CM

## 2022-03-01 DIAGNOSIS — Z98.890 OTHER SPECIFIED POSTPROCEDURAL STATES: ICD-10-CM

## 2022-03-01 DIAGNOSIS — F41.9 ANXIETY DISORDER, UNSPECIFIED: ICD-10-CM

## 2022-03-01 DIAGNOSIS — Z29.9 ENCOUNTER FOR PROPHYLACTIC MEASURES, UNSPECIFIED: ICD-10-CM

## 2022-03-01 DIAGNOSIS — D62 ACUTE POSTHEMORRHAGIC ANEMIA: ICD-10-CM

## 2022-03-01 LAB
ALBUMIN SERPL ELPH-MCNC: 2.5 G/DL — LOW (ref 3.3–5)
ALP SERPL-CCNC: 48 U/L — SIGNIFICANT CHANGE UP (ref 40–120)
ALT FLD-CCNC: 20 U/L — SIGNIFICANT CHANGE UP (ref 12–78)
ANION GAP SERPL CALC-SCNC: 5 MMOL/L — SIGNIFICANT CHANGE UP (ref 5–17)
AST SERPL-CCNC: 34 U/L — SIGNIFICANT CHANGE UP (ref 15–37)
BASOPHILS # BLD AUTO: 0.03 K/UL — SIGNIFICANT CHANGE UP (ref 0–0.2)
BASOPHILS NFR BLD AUTO: 0.4 % — SIGNIFICANT CHANGE UP (ref 0–2)
BILIRUB SERPL-MCNC: 0.4 MG/DL — SIGNIFICANT CHANGE UP (ref 0.2–1.2)
BUN SERPL-MCNC: 9 MG/DL — SIGNIFICANT CHANGE UP (ref 7–23)
CALCIUM SERPL-MCNC: 7.9 MG/DL — LOW (ref 8.5–10.1)
CHLORIDE SERPL-SCNC: 110 MMOL/L — HIGH (ref 96–108)
CO2 SERPL-SCNC: 26 MMOL/L — SIGNIFICANT CHANGE UP (ref 22–31)
CREAT SERPL-MCNC: 0.57 MG/DL — SIGNIFICANT CHANGE UP (ref 0.5–1.3)
EGFR: 124 ML/MIN/1.73M2 — SIGNIFICANT CHANGE UP
EOSINOPHIL # BLD AUTO: 0.16 K/UL — SIGNIFICANT CHANGE UP (ref 0–0.5)
EOSINOPHIL NFR BLD AUTO: 2 % — SIGNIFICANT CHANGE UP (ref 0–6)
GLUCOSE SERPL-MCNC: 94 MG/DL — SIGNIFICANT CHANGE UP (ref 70–99)
HCT VFR BLD CALC: 27.5 % — LOW (ref 34.5–45)
HGB BLD-MCNC: 9.2 G/DL — LOW (ref 11.5–15.5)
IMM GRANULOCYTES NFR BLD AUTO: 0.3 % — SIGNIFICANT CHANGE UP (ref 0–1.5)
LYMPHOCYTES # BLD AUTO: 1.67 K/UL — SIGNIFICANT CHANGE UP (ref 1–3.3)
LYMPHOCYTES # BLD AUTO: 21.2 % — SIGNIFICANT CHANGE UP (ref 13–44)
MAGNESIUM SERPL-MCNC: 2.1 MG/DL — SIGNIFICANT CHANGE UP (ref 1.6–2.6)
MCHC RBC-ENTMCNC: 29.5 PG — SIGNIFICANT CHANGE UP (ref 27–34)
MCHC RBC-ENTMCNC: 33.5 GM/DL — SIGNIFICANT CHANGE UP (ref 32–36)
MCV RBC AUTO: 88.1 FL — SIGNIFICANT CHANGE UP (ref 80–100)
MONOCYTES # BLD AUTO: 0.56 K/UL — SIGNIFICANT CHANGE UP (ref 0–0.9)
MONOCYTES NFR BLD AUTO: 7.1 % — SIGNIFICANT CHANGE UP (ref 2–14)
NEUTROPHILS # BLD AUTO: 5.42 K/UL — SIGNIFICANT CHANGE UP (ref 1.8–7.4)
NEUTROPHILS NFR BLD AUTO: 69 % — SIGNIFICANT CHANGE UP (ref 43–77)
NRBC # BLD: 0 /100 WBCS — SIGNIFICANT CHANGE UP (ref 0–0)
PHOSPHATE SERPL-MCNC: 3.1 MG/DL — SIGNIFICANT CHANGE UP (ref 2.5–4.5)
PLATELET # BLD AUTO: 223 K/UL — SIGNIFICANT CHANGE UP (ref 150–400)
POTASSIUM SERPL-MCNC: 3.7 MMOL/L — SIGNIFICANT CHANGE UP (ref 3.5–5.3)
POTASSIUM SERPL-SCNC: 3.7 MMOL/L — SIGNIFICANT CHANGE UP (ref 3.5–5.3)
PROT SERPL-MCNC: 5.6 G/DL — LOW (ref 6–8.3)
RBC # BLD: 3.12 M/UL — LOW (ref 3.8–5.2)
RBC # FLD: 13 % — SIGNIFICANT CHANGE UP (ref 10.3–14.5)
SODIUM SERPL-SCNC: 141 MMOL/L — SIGNIFICANT CHANGE UP (ref 135–145)
WBC # BLD: 7.86 K/UL — SIGNIFICANT CHANGE UP (ref 3.8–10.5)
WBC # FLD AUTO: 7.86 K/UL — SIGNIFICANT CHANGE UP (ref 3.8–10.5)

## 2022-03-01 PROCEDURE — 99223 1ST HOSP IP/OBS HIGH 75: CPT | Mod: GC

## 2022-03-01 PROCEDURE — 99233 SBSQ HOSP IP/OBS HIGH 50: CPT | Mod: GC

## 2022-03-01 RX ORDER — IBUPROFEN 200 MG
400 TABLET ORAL EVERY 6 HOURS
Refills: 0 | Status: DISCONTINUED | OUTPATIENT
Start: 2022-03-01 | End: 2022-03-02

## 2022-03-01 RX ADMIN — POLYETHYLENE GLYCOL 3350 17 GRAM(S): 17 POWDER, FOR SOLUTION ORAL at 11:42

## 2022-03-01 RX ADMIN — CHLORHEXIDINE GLUCONATE 1 APPLICATION(S): 213 SOLUTION TOPICAL at 05:13

## 2022-03-01 RX ADMIN — Medication 1000 MILLIGRAM(S): at 13:39

## 2022-03-01 RX ADMIN — PIPERACILLIN AND TAZOBACTAM 25 GRAM(S): 4; .5 INJECTION, POWDER, LYOPHILIZED, FOR SOLUTION INTRAVENOUS at 05:12

## 2022-03-01 RX ADMIN — Medication 400 MILLIGRAM(S): at 06:11

## 2022-03-01 RX ADMIN — Medication 1000 MILLIGRAM(S): at 14:09

## 2022-03-01 RX ADMIN — PIPERACILLIN AND TAZOBACTAM 25 GRAM(S): 4; .5 INJECTION, POWDER, LYOPHILIZED, FOR SOLUTION INTRAVENOUS at 13:40

## 2022-03-01 RX ADMIN — PIPERACILLIN AND TAZOBACTAM 25 GRAM(S): 4; .5 INJECTION, POWDER, LYOPHILIZED, FOR SOLUTION INTRAVENOUS at 21:46

## 2022-03-01 RX ADMIN — Medication 400 MILLIGRAM(S): at 19:45

## 2022-03-01 RX ADMIN — HEPARIN SODIUM 5000 UNIT(S): 5000 INJECTION INTRAVENOUS; SUBCUTANEOUS at 21:45

## 2022-03-01 RX ADMIN — HEPARIN SODIUM 5000 UNIT(S): 5000 INJECTION INTRAVENOUS; SUBCUTANEOUS at 13:40

## 2022-03-01 RX ADMIN — Medication 400 MILLIGRAM(S): at 23:54

## 2022-03-01 RX ADMIN — HEPARIN SODIUM 5000 UNIT(S): 5000 INJECTION INTRAVENOUS; SUBCUTANEOUS at 05:13

## 2022-03-01 RX ADMIN — Medication 400 MILLIGRAM(S): at 02:08

## 2022-03-01 RX ADMIN — Medication 400 MILLIGRAM(S): at 02:38

## 2022-03-01 RX ADMIN — Medication 400 MILLIGRAM(S): at 11:42

## 2022-03-01 RX ADMIN — Medication 400 MILLIGRAM(S): at 18:03

## 2022-03-01 RX ADMIN — Medication 400 MILLIGRAM(S): at 12:12

## 2022-03-01 NOTE — PROGRESS NOTE ADULT - PROBLEM SELECTOR PLAN 1
Continue monitoring H/H.  Mildly downtrended today to 9.2 from 10 yesterday  Will start on xanax prn  Continue current diet  Monitor GI function  Ambulation  If continues to improve, may be able to downgrade to the floor today or tomorrow  Continue VTE prophylaxis,   Continue ABX  Discussed with Dr. Valiente.

## 2022-03-01 NOTE — PROGRESS NOTE ADULT - ASSESSMENT
Patient is a 32 yo female with a pmh of missed  and ovarian cyst who is being admitted to ICU post operatively from a D&C hysteroscopy and laparoscopic right ovarian cystectomy that was complicated by injury to the aorta resulting in open approach repair. Patient being admitted to ICU with hypovolemic shock, anemia with acute blood loss, retroperitoneal bleed and leukocytosis. Patient became hypotensive given hemorrhagic shock, MTP initiated, 24 hr total 5 prbc, 1 ffp, 1 platelet.  Plan:  Neuro  - c/w standing motrin for 24 hrs then reassess  - 1 g tylenol q8h for mild pain  - 1g PO dilaudid for moderate pain  - 1g IV dilauded for severe pain  CV  - HD stable off pressure support, hypovolemic shock resolved   - stop IVF  Respiratory  - s/p extubation   - PRN Robitussin for cough and mucus   GI  - open approach for primary repair, abdomen open for substantial period of time, concern for ileus  - Regular diet  - c/w senna and miralax until bm normal  - CT abd: RP bleed appears to be contained, will order repeat CT for today  - GYN following case    - monitor electrolytes  - good urine output, evans removed  Heme  - h/h appears to be equalizing, trend cbc qdaily  - Transfuse if hgb <7 or if symptomatic.  - start heparin q8h for DVT ppx  ID  - D/t retroperitoneal bleed and extensive operation  - empiric abx coverage per GYN’s recommendation  - started on zosyn  - BC NGTD  Lines  - no tubes no lines     Patient is a 30 yo female with a pmh of missed  and ovarian cyst who is being admitted to ICU post operatively from a D&C hysteroscopy and laparoscopic right ovarian cystectomy that was complicated by injury to the aorta resulting in open approach repair. Patient being admitted to ICU with hypovolemic shock, anemia with acute blood loss, retroperitoneal bleed and leukocytosis. Patient became hypotensive given hemorrhagic shock, MTP initiated, 24 hr total 5 prbc, 1 ffp, 1 platelet.  Plan:  Neuro  - c/w standing motrin for 24 hrs then reassess  - 1 g tylenol q8h for mild pain  - 1g PO dilaudid for moderate pain  - 1g IV dilauded for severe pain  CV  - HD stable off pressure support, hypovolemic shock resolved   Respiratory  - s/p extubation   - PRN Robitussin for cough and mucus   GI  - open approach for primary repair, abdomen open for substantial period of time, concern for ileus  - Regular diet  - c/w senna and miralax until bm normal  - CT abd: RP bleed appears to be contained  - GYN following case    - no active issues  Heme  - h/h equalizing, trend cbc qdaily  - Transfuse if hgb <7 or if symptomatic.  - c/w heparin q8h for DVT ppx  ID  - D/t retroperitoneal bleed and extensive operation  - empiric zosyn coverage per GYN’s recommendation  - BC NGTD  Lines  - no tubes no lines

## 2022-03-01 NOTE — PROGRESS NOTE ADULT - SUBJECTIVE AND OBJECTIVE BOX
Patient is a 31y old  Female who presents with a chief complaint of elective surgery (26 Feb 2022 11:04)    24 hour events: ***    REVIEW OF SYSTEMS  Constitutional: No fever, chills, fatigue  Neuro: No headache, numbness, weakness  Resp: No cough, wheezing, shortness of breath  CVS: No chest pain, palpitations, leg swelling  GI: No abdominal pain, nausea, vomiting, diarrhea   : No dysuria, frequency, incontinence  Skin: No itching, burning, rashes, or lesions   Msk: No joint pain or swelling  Psych: No depression, anxiety, mood swings  Heme: No bleeding    T(F): 98 (03-01-22 @ 09:00), Max: 98.4 (02-28-22 @ 20:00)  HR: 79 (03-01-22 @ 11:00) (68 - 89)  BP: 113/68 (03-01-22 @ 11:00) (92/65 - 123/76)  RR: 20 (03-01-22 @ 11:00) (13 - 36)  SpO2: 93% (03-01-22 @ 10:00) (92% - 99%)  Wt(kg): --            I&O's Summary    02-28 @ 07:01  -  03-01 @ 07:00  --------------------------------------------------------  IN: 1519.8 mL / OUT: 1270 mL / NET: 249.8 mL    03-01 @ 07:01  -  03-01 @ 11:30  --------------------------------------------------------  IN: 360 mL / OUT: 0 mL / NET: 360 mL      PHYSICAL EXAM  General:   CNS:   HEENT:   Resp:   CVS:   Abd:   Ext:   Skin:     MEDICATIONS  piperacillin/tazobactam IVPB.. IV Intermittent        guaiFENesin Oral Liquid (Sugar-Free) Oral PRN    acetaminophen     Tablet .. Oral PRN  HYDROmorphone   Tablet Oral PRN  HYDROmorphone  Injectable IV Push PRN  ibuprofen  Tablet. Oral      heparin   Injectable SubCutaneous    polyethylene glycol 3350 Oral  senna Oral          chlorhexidine 2% Cloths Topical                            9.2    7.86  )-----------( 223      ( 01 Mar 2022 05:22 )             27.5       03-01    141  |  110<H>  |  9   ----------------------------<  94  3.7   |  26  |  0.57    Ca    7.9<L>      01 Mar 2022 05:22  Phos  3.1     03-01  Mg     2.1     03-01    TPro  5.6<L>  /  Alb  2.5<L>  /  TBili  0.4  /  DBili  x   /  AST  34  /  ALT  20  /  AlkPhos  48  03-01              .Blood Blood   No growth to date. -- 02-25 @ 18:34        Radiology: ***  Bedside lung ultrasound: ***  Bedside ECHO: ***    CENTRAL LINE: Y/N          DATE INSERTED:              REMOVE: Y/N  JUNIOR: Y/N                        DATE INSERTED:              REMOVE: Y/N  A-LINE: Y/N                       DATE INSERTED:              REMOVE: Y/N    GLOBAL ISSUE/BEST PRACTICE  Analgesia:   Sedation:   CAM-ICU:   HOB elevation: yes  Stress ulcer prophylaxis:   VTE prophylaxis:   Glycemic control:   Nutrition:     CODE STATUS: ***  [facility] discussion: Y       Procedures   Obstetrical ultrasound completed today.  See report in imaging section of [facility].     Patient is a 31y old  Female who presents with a chief complaint of elective surgery (26 Feb 2022 11:04)    24 hour events: 2 doses of dilaudid in 24 hours    REVIEW OF SYSTEMS  Constitutional: No fever, chills, fatigue  Neuro: No headache, numbness, weakness  Resp: No cough, wheezing, shortness of breath  CVS: No chest pain, palpitations, leg swelling  GI: No abdominal pain, nausea, vomiting, diarrhea   : No dysuria, frequency, incontinence  Skin: No itching, burning, rashes, or lesions   Msk: No joint pain or swelling  Psych: No depression, anxiety, mood swings  Heme: No bleeding    T(F): 98 (03-01-22 @ 09:00), Max: 98.4 (02-28-22 @ 20:00)  HR: 79 (03-01-22 @ 11:00) (68 - 89)  BP: 113/68 (03-01-22 @ 11:00) (92/65 - 123/76)  RR: 20 (03-01-22 @ 11:00) (13 - 36)  SpO2: 93% (03-01-22 @ 10:00) (92% - 99%)  Wt(kg): --            I&O's Summary    02-28 @ 07:01  -  03-01 @ 07:00  --------------------------------------------------------  IN: 1519.8 mL / OUT: 1270 mL / NET: 249.8 mL    03-01 @ 07:01  -  03-01 @ 11:30  --------------------------------------------------------  IN: 360 mL / OUT: 0 mL / NET: 360 mL      PHYSICAL EXAM  General: NAD, lying in bed  CNS: alert, answers questions appropriately  HEENT: NCAT  Resp: clear breath sounds b/l  CVS: regular rate and rhythm, +s1s2, no murmurs, rubs, or gallops   Abd: soft, nontender, nondistended, x4,  no guarding, incision clean appearing with minimal staining of dressing  Ext: no clubbing, cyanosis or edema, +2 pedal pulses  Skin: warm and dry    MEDICATIONS  piperacillin/tazobactam IVPB.. IV Intermittent        guaiFENesin Oral Liquid (Sugar-Free) Oral PRN    acetaminophen     Tablet .. Oral PRN  HYDROmorphone   Tablet Oral PRN  HYDROmorphone  Injectable IV Push PRN  ibuprofen  Tablet. Oral      heparin   Injectable SubCutaneous    polyethylene glycol 3350 Oral  senna Oral          chlorhexidine 2% Cloths Topical                            9.2    7.86  )-----------( 223      ( 01 Mar 2022 05:22 )             27.5       03-01    141  |  110<H>  |  9   ----------------------------<  94  3.7   |  26  |  0.57    Ca    7.9<L>      01 Mar 2022 05:22  Phos  3.1     03-01  Mg     2.1     03-01    TPro  5.6<L>  /  Alb  2.5<L>  /  TBili  0.4  /  DBili  x   /  AST  34  /  ALT  20  /  AlkPhos  48  03-01              .Blood Blood   No growth to date. -- 02-25 @ 18:34        CENTRAL LINE: N  JUNIOR: N          A-LINE: N       GLOBAL ISSUE/BEST PRACTICE  Analgesia: Y  Sedation: N  CAM-ICU: neg  HOB elevation: yes  Stress ulcer prophylaxis: yes  VTE prophylaxis: Y  Glycemic control: neg  Nutrition: NPO    CODE STATUS: FULL CODE       Patient is a 31y old  Female who presents with a chief complaint of elective surgery (26 Feb 2022 11:04)    24 hour events: 2 doses of dilaudid in 24 hours    REVIEW OF SYSTEMS  Neuro: No weakness  Resp: No shortness of breath  CVS: No chest pain leg swelling  GI: Mild abdominal pain, nausea, vomiting, diarrhea, constipation  Msk: No joint pain or swelling    T(F): 98 (03-01-22 @ 09:00), Max: 98.4 (02-28-22 @ 20:00)  HR: 79 (03-01-22 @ 11:00) (68 - 89)  BP: 113/68 (03-01-22 @ 11:00) (92/65 - 123/76)  RR: 20 (03-01-22 @ 11:00) (13 - 36)  SpO2: 93% (03-01-22 @ 10:00) (92% - 99%)  Wt(kg): --            I&O's Summary    02-28 @ 07:01  -  03-01 @ 07:00  --------------------------------------------------------  IN: 1519.8 mL / OUT: 1270 mL / NET: 249.8 mL    03-01 @ 07:01  -  03-01 @ 11:30  --------------------------------------------------------  IN: 360 mL / OUT: 0 mL / NET: 360 mL      PHYSICAL EXAM  General: NAD, lying in bed  CNS: alert, answers questions appropriately  HEENT: NCAT  Resp: clear breath sounds b/l  CVS: regular rate and rhythm, +s1s2, no murmurs, rubs, or gallops   Abd: soft, nontender, nondistended, x4,  no guarding, incision clean appearing with minimal staining of dressing  Ext: no clubbing, cyanosis or edema, +2 pedal pulses  Skin: warm and dry    MEDICATIONS  piperacillin/tazobactam IVPB.. IV Intermittent        guaiFENesin Oral Liquid (Sugar-Free) Oral PRN    acetaminophen     Tablet .. Oral PRN  HYDROmorphone   Tablet Oral PRN  HYDROmorphone  Injectable IV Push PRN  ibuprofen  Tablet. Oral      heparin   Injectable SubCutaneous    polyethylene glycol 3350 Oral  senna Oral          chlorhexidine 2% Cloths Topical                            9.2    7.86  )-----------( 223      ( 01 Mar 2022 05:22 )             27.5       03-01    141  |  110<H>  |  9   ----------------------------<  94  3.7   |  26  |  0.57    Ca    7.9<L>      01 Mar 2022 05:22  Phos  3.1     03-01  Mg     2.1     03-01    TPro  5.6<L>  /  Alb  2.5<L>  /  TBili  0.4  /  DBili  x   /  AST  34  /  ALT  20  /  AlkPhos  48  03-01              .Blood Blood   No growth to date. -- 02-25 @ 18:34        CENTRAL LINE: N  JUNIOR: N          A-LINE: N       GLOBAL ISSUE/BEST PRACTICE  Analgesia: Y  Sedation: N  CAM-ICU: neg  HOB elevation: yes  Stress ulcer prophylaxis: yes  VTE prophylaxis: Y  Glycemic control: neg  Nutrition: NPO    CODE STATUS: FULL CODE       Patient is a 31y old  Female who presents with a chief complaint of elective surgery (26 Feb 2022 11:04)    24 hour events: 2 doses of dilaudid in 24 hours, pain reasonably well controlled, BM this am    REVIEW OF SYSTEMS  Neuro: No weakness  Resp: No shortness of breath  CVS: No chest pain leg swelling  GI: Mild abdominal pain, nausea, vomiting, diarrhea, constipation  Msk: No joint pain or swelling    T(F): 98 (03-01-22 @ 09:00), Max: 98.4 (02-28-22 @ 20:00)  HR: 79 (03-01-22 @ 11:00) (68 - 89)  BP: 113/68 (03-01-22 @ 11:00) (92/65 - 123/76)  RR: 20 (03-01-22 @ 11:00) (13 - 36)  SpO2: 93% (03-01-22 @ 10:00) (92% - 99%)  Wt(kg): --            I&O's Summary    02-28 @ 07:01  -  03-01 @ 07:00  --------------------------------------------------------  IN: 1519.8 mL / OUT: 1270 mL / NET: 249.8 mL    03-01 @ 07:01  -  03-01 @ 11:30  --------------------------------------------------------  IN: 360 mL / OUT: 0 mL / NET: 360 mL      PHYSICAL EXAM  General: NAD, lying in bed  CNS: alert, answers questions appropriately  HEENT: NCAT  Resp: clear breath sounds b/l  CVS: regular rate and rhythm, +s1s2, no murmurs, rubs, or gallops   Abd: soft, nontender, nondistended, x4,  no guarding, incision clean appearing with minimal staining of dressing  Ext: no clubbing, cyanosis or edema, +2 pedal pulses  Skin: warm and dry    MEDICATIONS  piperacillin/tazobactam IVPB.. IV Intermittent        guaiFENesin Oral Liquid (Sugar-Free) Oral PRN    acetaminophen     Tablet .. Oral PRN  HYDROmorphone   Tablet Oral PRN  HYDROmorphone  Injectable IV Push PRN  ibuprofen  Tablet. Oral      heparin   Injectable SubCutaneous    polyethylene glycol 3350 Oral  senna Oral          chlorhexidine 2% Cloths Topical                            9.2    7.86  )-----------( 223      ( 01 Mar 2022 05:22 )             27.5       03-01    141  |  110<H>  |  9   ----------------------------<  94  3.7   |  26  |  0.57    Ca    7.9<L>      01 Mar 2022 05:22  Phos  3.1     03-01  Mg     2.1     03-01    TPro  5.6<L>  /  Alb  2.5<L>  /  TBili  0.4  /  DBili  x   /  AST  34  /  ALT  20  /  AlkPhos  48  03-01              .Blood Blood   No growth to date. -- 02-25 @ 18:34        CENTRAL LINE: N  JUNIOR: N          A-LINE: N       GLOBAL ISSUE/BEST PRACTICE  Analgesia: Y  Sedation: N  CAM-ICU: neg  HOB elevation: yes  Stress ulcer prophylaxis: yes  VTE prophylaxis: Y  Glycemic control: neg  Nutrition: NPO    CODE STATUS: FULL CODE

## 2022-03-01 NOTE — PROGRESS NOTE ADULT - ATTENDING COMMENTS
Patient seen at bedside with partner   Patient s/p BM. Patient reports some minor pain prior to BM but well formed and no difficulty with passage   Reports some left sided abdominal pain   Discussed again with patient repair of aorta on left side and hematoma present on left side so may feel more pain on that side   Abdomen continues to be soft , non tender, no rebound or guarding   Incision, clean , dry and intact   Discussed with patient, partner and ICU team plan to downgrade tonight or tomorrow morning based on bed availability   Will continue to encourage po intake   Incentive spirometry encouraged   optimize Pain control   Daily AM CBC as long as remains stable   Ambulation as tolerated   Xanax ordered PRN and patient reports feeling less anxious than last night   Discussed recommendation for SW   Patient aware that as long as remains stable after downgrade at least two days on regular floor to continue to monitor progress

## 2022-03-01 NOTE — PROGRESS NOTE ADULT - SUBJECTIVE AND OBJECTIVE BOX
Postoperative Day #: 6    31y Female admitted with Abdominal pain    Cyst of ovary  S/P Lap converted to open ovarian cystectomy, chromotubation of fallopian tubes and repair of aortic injury      Patient seen and examined bedside resting comfortably.  States had some issues sleeping yesterday.  Woke up panicked and SOB.  Currently much more comfortable.  Is tolerating diet.  Ambulating, voiding, passing flatus.  no BM as of yet.       T(F): 98 (03-01-22 @ 09:00), Max: 98.4 (02-28-22 @ 20:00)  HR: 81 (03-01-22 @ 10:00) (68 - 89)  BP: 92/65 (03-01-22 @ 10:00) (92/65 - 123/76)  RR: 25 (03-01-22 @ 10:00) (13 - 36)  SpO2: 93% (03-01-22 @ 10:00) (91% - 99%)  Wt(kg): --  CAPILLARY BLOOD GLUCOSE          PHYSICAL EXAM:  General: NAD  Neuro:  Alert & oriented x 3  CV: +S1+S2 regular rate and rhythm  Lung: clear to ausculation bilaterally. Decreased breath sounds bilaterally in the lower lung fields.   Abdomen: Incision covered in steri strips and ABD pads.  ABdominal binder moved for examination.  BS+   Extremities: no pedal edema or calf tenderness noted     LABS:                        9.2    7.86  )-----------( 223      ( 01 Mar 2022 05:22 )             27.5     03-01    141  |  110<H>  |  9   ----------------------------<  94  3.7   |  26  |  0.57    Ca    7.9<L>      01 Mar 2022 05:22  Phos  3.1     03-01  Mg     2.1     03-01    TPro  5.6<L>  /  Alb  2.5<L>  /  TBili  0.4  /  DBili  x   /  AST  34  /  ALT  20  /  AlkPhos  48  03-01      I&O's Detail    28 Feb 2022 07:01  -  01 Mar 2022 07:00  --------------------------------------------------------  IN:    IV PiggyBack: 499.8 mL    IV PiggyBack: 300 mL    Oral Fluid: 720 mL  Total IN: 1519.8 mL    OUT:    Voided (mL): 1270 mL  Total OUT: 1270 mL    Total NET: 249.8 mL            RADIOLOGY:

## 2022-03-01 NOTE — CONSULT NOTE ADULT - PROBLEM SELECTOR RECOMMENDATION 9
Likely 2/2 intra-op complication of aortic injury with L retroperitoneal hematoma   - S/p MTP with total 5unit pRBC/1plt/1FFP   - H/H plateauing  - Continue to trend H/H with daily CBC, transfuse hgb <7 Secondary to intraoperative aortic injury  - S/p MTP with total 5unit pRBC/1plt/1FFP   - H/H stable, pt hemodynamically stable   - Continue to trend H/H with daily CBC, transfuse hgb <7  - Monitor for s/s of active bleeding   - Continue empiric Zosyn as per GYN recs   - Blood cultures - NGTD Secondary to intraoperative aortic injury  - S/p MTP with total 5unit pRBC/1plt/1FFP   - H/H stable, pt hemodynamically stable   - Continue to trend H/H with daily CBC, transfuse hgb <7  - Monitor for s/s of active bleeding   - Continue empiric Zosyn as per GYN recs   - Blood cultures - NGTD  -vascular surgery recommendations appreciated

## 2022-03-01 NOTE — CHART NOTE - NSCHARTNOTEFT_GEN_A_CORE
Assessment/Follow up: Pt A+Ox4 during visit. Diet advanced 2/28. Pt reports tolerating well. No report difficulty chewing/swallowing. No N/V. Tender abdomen +bloating/discomfort. +flatus. No BM since 2/24. Bowel regimen rx. Encourage po fluid/dietary fiber as tolerated. Food preferences/meal alternatives obtained. Encourage consumption of HBV protein sources. Current stated weight per pt 180lbs. Prior to hospitalization pt "has been trying to work out a little more and eat a little better." Will remain available if pt interested in diet education prior to discharge.    Factors impacting intake: [x ] none [ ] nausea  [ ] vomiting [ ] diarrhea [ ] constipation  [ ]chewing problems [ ] swallowing issues  [x ] other:     Diet Presciption: Diet, Regular:   Low Fat (LOWFAT) (02-28-22 @ 08:36)    Intake: Fair    Current Weight: Weight (kg): 92 (02-25 @ 12:45), gen 1+edema      Pertinent Medications: MEDICATIONS  (STANDING):  chlorhexidine 2% Cloths 1 Application(s) Topical <User Schedule>  heparin   Injectable 5000 Unit(s) SubCutaneous every 8 hours  ibuprofen  Tablet. 400 milliGRAM(s) Oral every 6 hours  piperacillin/tazobactam IVPB.. 3.375 Gram(s) IV Intermittent every 8 hours  polyethylene glycol 3350 17 Gram(s) Oral daily  senna 2 Tablet(s) Oral at bedtime    MEDICATIONS  (PRN):  acetaminophen     Tablet .. 1000 milliGRAM(s) Oral every 8 hours PRN Mild Pain (1 - 3)  guaiFENesin Oral Liquid (Sugar-Free) 100 milliGRAM(s) Oral every 6 hours PRN secretion  HYDROmorphone   Tablet 1 milliGRAM(s) Oral every 4 hours PRN Moderate Pain (4 - 6)  HYDROmorphone  Injectable 1 milliGRAM(s) IV Push every 6 hours PRN Severe Pain (7 - 10)    Pertinent Labs: 03-01 Na141 mmol/L Glu 94 mg/dL K+ 3.7 mmol/L Cr  0.57 mg/dL BUN 9 mg/dL 03-01 Phos 3.1 mg/dL 03-01 Alb 2.5 g/dL<L>     CAPILLARY BLOOD GLUCOSE        Skin: abdomen surgical incision. Mitch 20.     Estimated Needs:   [x ] no change since previous assessment  [ ] recalculated:     Previous Nutrition Diagnosis:   [ ] Inadequate Energy Intake [ ]Inadequate Oral Intake [ ] Excessive Energy Intake   [ ] Underweight [ ] Increased Nutrient Needs [ ] Overweight/Obesity   [x ] Altered GI Function [ ] Unintended Weight Loss [ ] Food & Nutrition Related Knowledge Deficit [ ] Malnutrition     Nutrition Diagnosis is [x ] ongoing-po diet initiated, bowel regimen rx  [ ] resolved [ ] not applicable     New Nutrition Diagnosis: [x ] not applicable       Interventions:   Recommend  [ ] Change Diet To:  [ ] Nutrition Supplement  [ ] Nutrition Support  [x ] Other: Encourage po intake with emphasis on HBV protein sources. Food preferences/meal alternatives obtained/honored.     Monitoring and Evaluation:   [x ] PO intake [ x ] Tolerance to diet prescription [ x ] weights [ x ] labs[ x ] follow up per protocol  [ ] other:
Patient seen at bedside s/p extubation   Partner at bedside   Vital signs remain stable   Repeat cbc stable from this morning   Patient reports some nausea but pain is well controlled   Gen: NAD, laying in bed   Abdomen: soft, non tender. no rebound or guarding   Neuro: A & O x 3   Discussed again with patient and partner intraop hemorrhage and need for aortic repair by vascular surgery  All questions answered   Patient understanding that recovery will be longer and will need additional time off from work   Discussed slow transitioning from npo to full diet   pain control discussed   Discussed patient will remain in ICU until stable for transfer to floor
Patient seen at bedside with    Patient currently intubated   Patient informed of hemorrhage during laparoscopic surgery requiring laparotomy and transfusions.  Patient informed that D&C hysteroscopy, right ovarian cystectomy and chromotubation was performed   Patient notified that no injury to uterus, fallopian tubes or ovaries occurred and uterus is intact and vaginal delivery is not contraindicated   Continued need for close ICU monitoring at this time discussed due to blood loss , respiratory and pressure support  Possible need for additional transfusions discussed   Care as per ICU team until transfer to floor

## 2022-03-01 NOTE — CONSULT NOTE ADULT - ASSESSMENT
**CHARTING IN PROGRESS  Patient is a 30 yo female with a pmh of anxiety, missed  and ovarian cyst POD#4 s/p laparoscopic ovarian cystectomy complicated by aortic injury with L retroperitoneal hematoma s/p MTP with total 5unit pRBC/1plt/1FFP.   Patient is a 32 yo female with a pmh of anxiety, missed  and ovarian cyst POD#4 s/p laparoscopic ovarian cystectomy complicated by aortic injury with L retroperitoneal hematoma s/p MTP with total 5unit pRBC/1plt/1FFP.   Patient is a 30 yo female with a pmh of anxiety, missed  and ovarian cyst POD#4 s/p laparoscopic ovarian cystectomy complicated by aortic injury with L retroperitoneal hematoma s/p MTP with total 5unit pRBC/1plt/1FFP. Medicine consulted for medical management of acute blood loss anemia 2/2 L retroperitoneal hematoma.

## 2022-03-01 NOTE — CONSULT NOTE ADULT - ATTENDING COMMENTS
Patient is a 32 yo female with a pmh of anxiety, missed  and ovarian cyst POD#4 s/p laparoscopic ovarian cystectomy complicated by aortic injury with L retroperitoneal hematoma s/p MTP with total 5unit pRBC/1plt/1FFP. Medicine consulted for medical management of acute blood loss anemia 2/2 L retroperitoneal hematoma.     patient seen and examined at bedside. She reports abdominal soreness b/l lower quadrants but improved. Soreness is worse with coughing. Denies and bloody drainage from incision sites. Denies fevers, chills, headaches, nausea, vomiting, chest pain, SOB, palpitations, constipation, diarrhea, melena, hematochezia, dysuria. Had a well formed bowel movement this morning. She is producing urine and denies decreased urine output.     T(C): 36.8 (22 @ 15:11), Max: 36.9 (22 @ 20:00)  HR: 81 (22 @ 18:00) (68 - 89)  BP: 118/75 (22 @ 18:00) (92/65 - 122/76)  RR: 20 (22 @ 18:00) (13 - 36)  SpO2: 98% (22 @ 18:00) (93% - 99%)  Wt(kg): --    Physical Exam:   GENERAL: well-groomed, well-developed, NAD  HEENT: head NC/AT; conjunctiva & sclera clear; hearing grossly intact, moist mucous membranes  NECK: supple, no JVD  RESPIRATORY: CTA B/L, no wheezing, rales, rhonchi or rubs  CARDIOVASCULAR: S1&S2, RRR, no murmurs or gallops  ABDOMEN: soft, sore at incisional site, non-distended, + Bowel sounds x4 quadrants, no guarding, rebound or rigidity. Adbominal binder in place, no drainage noted through abdoninal dressing  MUSCULOSKELETAL:  no clubbing, cyanosis or edema of all 4 extremities  LYMPH: no cervical lymphadenopathy  VASCULAR: no varicose veins   SKIN: warm and dry, color normal  NEUROLOGIC: AA&O X3, CN2-12 grossly intact w/ no focal deficits, no sensory loss, moving all extremities  Psych: Normal mood and affect, normal behavior    Plan:   Acute blood loss anemia: s/p massive transfusion protocol with FFP and 5 units of PRBC transfused.   Continue to monitor Hgb  -she is hemodynamically stable. Extubated and saturating well on room air.   -can de-escalate and/or discontinue abx as I do not suspect any underlying infectious etiology at this time.   -she remains afebrile and has a normal WBC count and blood culture showing NGTD  -GYN, vascular and ICU recommendations appreciated.

## 2022-03-01 NOTE — CONSULT NOTE ADULT - CONSULT REASON
post-op aortic injury
intraoperative complication leading to hemorrhagic shock requiring MTP
Hypovolemic Shock

## 2022-03-01 NOTE — PROGRESS NOTE ADULT - ASSESSMENT
32 yo female here for laparoscopic converted to open ovarian cystectomy with chromotubation of fallopian tubes and repair of aortic injury.

## 2022-03-01 NOTE — CONSULT NOTE ADULT - SUBJECTIVE AND OBJECTIVE BOX
**CHARTING IN PROGRESS**    Patient is a 31y old  Female who presents with a chief complaint of elective surgery (2022 11:04)      FROM ICU ADMISSION H&P  Patient is a 30 yo female with a pmh of anxiety, missed  and ovarian cyst who presented to [facility] for an elective D&C hysteroscopy and laparoscopic right ovarian cystectomy. Patient in OR with injury to aorta resulting in increased blood loss, primary repair of injury and open approach to cyst removal. Prior to OR patient with hgb of 13.7, repeat in OR 9.3, WBC from 6.56 to 26.24. ABG obtained showing 7.33/co2 39/po2 351/hco3 21. ICU consulted for post-operative care as patient requiring multiple PRBC transfusions, hemodynamic instability and post-operative retroperitoneal bleed. In OR patient received x2 PRBC, and 3.5L IV crystalloid with 325 of UO. Decision was made collaboratively to leave patient intubated in the post operative stage.   Upon arrival of to ICU, patient is intubated with abdominal binder in place. Patient escorted by anesthesia and GYN attending. Patient is awakening to stimulation and following commands. L arterial line in place. Patient tachycardic and normotensive on ECG monitor. Subsequent hgb 10.9 with wbc of 35.58.  Patient to be admitted to ICU with plans of extubation once optimized.   Addendum: Patient with acute hypotension, worsening hemorrhagic shock, MTP initiated. CT with contrast obtained. See plan and assessment below.   ----  INTERVAL HPI/OVERNIGHT EVENTS: Patient extubated on 28. Patient is s/p MTP in ICU with 5 prbc, 1 ffp, 1 plt. H/H stabilized, dvt ppx restarted last night. Pt seen and evaluated at the bedside. No acute overnight events occurred.     ----  PAST MEDICAL & SURGICAL HISTORY:  Moderate anxiety    Missed   2021    Ectopic pregnancy  2022    History of spinal fusion for scoliosis          ----  Home medications:    ----  FAMILY HISTORY:  No pertinent family history in first degree relatives        ----  Allergies    No Known Allergies    Intolerances        ----  Social History:  - etoh:  - tobacco:  - recreational drug use:  - occupation:  - living circumstances:  - ambulation status:    ----  REVIEW OF SYSTEMS:  CONSTITUTIONAL: denies fever, chills, fatigue, weakness  HEENT: denies blurred vision, sore throat  SKIN: denies new lesions, rash  CARDIOVASCULAR: denies chest pain, chest pressure, palpitations  RESPIRATORY: denies shortness of breath, sputum production  GASTROINTESTINAL: denies nausea, vomiting, diarrhea, abdominal pain  GENITOURINARY: denies dysuria, discharge  NEUROLOGICAL: denies numbness, headache, focal weakness  MUSCULOSKELETAL: denies new joint pain, muscle aches  HEMATOLOGIC: denies gross bleeding, bruising  LYMPHATICS: denies enlarged lymph nodes, extremity swelling  PSYCHIATRIC: denies recent changes in anxiety, depression  ENDOCRINOLOGIC: denies sweating, cold or heat intolerance    ----  PHYSICAL EXAM:  GENERAL: patient appears well, no acute distress, appropriately interactive  EYES: sclera clear, no exudates  ENMT: oropharynx clear without erythema, moist mucous membranes  NECK: supple, soft, no thyromegaly noted  LUNGS: good air entry bilaterally, clear to auscultation, symmetric breath sounds, no wheezing or rhonchi appreciated  HEART: soft S1/S2, regular rate and rhythm, no murmurs noted, no noted edema to b/l LE  GASTROINTESTINAL: abdomen is soft, nontender, nondistended, normoactive bowel sounds, no palpable masses  INTEGUMENT: good skin turgor, appropriate for ethnicity, appears well perfused, no jaundice noted  MUSCULOSKELETAL: no clubbing or cyanosis, no obvious deformity  NEUROLOGIC: awake, alert, oriented x3, good muscle tone in 4 extremities, no obvious sensory deficits  PSYCHIATRIC: mood is good, affect is congruent with mood, linear and logical thought process  HEME/LYMPH: no palpable supraclavicular nodules, no obvious ecchymosis     T(C): 35.6 (22 @ 12:00), Max: 36.9 (22 @ 20:00)  HR: 80 (22 @ 15:00) (68 - 89)  BP: 109/72 (22 @ 15:00) (92/65 - 123/76)  RR: 25 (22 @ 15:00) (13 - 36)  SpO2: 96% (22 @ 15:00) (92% - 99%)  Wt(kg): --    ----  I&O's Summary    2022 07:01  -  01 Mar 2022 07:00  --------------------------------------------------------  IN: 1519.8 mL / OUT: 1270 mL / NET: 249.8 mL    01 Mar 2022 07:01  -  01 Mar 2022 15:33  --------------------------------------------------------  IN: 360 mL / OUT: 0 mL / NET: 360 mL        LABS:                        9.2    7.86  )-----------( 223      ( 01 Mar 2022 05:22 )             27.5     03-01    141  |  110<H>  |  9   ----------------------------<  94  3.7   |  26  |  0.57    Ca    7.9<L>      01 Mar 2022 05:22  Phos  3.1     03-01  Mg     2.1     -    TPro  5.6<L>  /  Alb  2.5<L>  /  TBili  0.4  /  DBili  x   /  AST  34  /  ALT  20  /  AlkPhos  48  03-        CAPILLARY BLOOD GLUCOSE          02-25 @ 18:34   No growth to date.  --  --            ----  Personally reviewed:  Vital sign trends: [ x ] yes    [  ] no     [  ] n/a  Laboratory results: [ x ] yes    [  ] no     [  ] n/a  Radiology results: [ x ] yes    [  ] no     [  ] n/a  Culture results: [ x ] yes    [  ] no     [  ] n/a  Consultant recommendations: [ x ] yes    [  ] no     [  ] n/a         Patient is a 31y old  Female who presents with a chief complaint of elective surgery (2022 11:04)    FROM ICU ADMISSION H&P  Patient is a 30 yo female with a pmh of anxiety, missed  and ovarian cyst who presented to [facility] for an elective D&C hysteroscopy and laparoscopic right ovarian cystectomy. Patient in OR with injury to aorta resulting in increased blood loss, primary repair of injury and open approach to cyst removal. Prior to OR patient with hgb of 13.7, repeat in OR 9.3, WBC from 6.56 to 26.24. ABG obtained showing 7.33/co2 39/po2 351/hco3 21. ICU consulted for post-operative care as patient requiring multiple PRBC transfusions, hemodynamic instability and post-operative retroperitoneal bleed. In OR patient received x2 PRBC, and 3.5L IV crystalloid with 325 of UO. Decision was made collaboratively to leave patient intubated in the post operative stage.   Upon arrival of to ICU, patient is intubated with abdominal binder in place. Patient escorted by anesthesia and GYN attending. Patient is awakening to stimulation and following commands. L arterial line in place. Patient tachycardic and normotensive on ECG monitor. Subsequent hgb 10.9 with wbc of 35.58.  Patient to be admitted to ICU with plans of extubation once optimized.   Addendum: Patient with acute hypotension, worsening hemorrhagic shock, MTP initiated. CT with contrast obtained. See plan and assessment below.   ----  INTERVAL HPI/OVERNIGHT EVENTS: Patient extubated on 28. Patient is s/p MTP in ICU with 5 prbc, 1 ffp, 1 plt. H/H stabilized, dvt ppx restarted last night. Pt seen and evaluated at the bedside. Patient with lower abdominal pain, worse on the left but states that pain is well controlled on pain regimen. +BMs, passing flatus, urinating without any pain or difficulty. Endorses increased mucus production related to ET tube. Also reports occasional feelings of anxiety and SOB. No acute overnight events occurred.     ----  PAST MEDICAL & SURGICAL HISTORY:  Moderate anxiety    Missed   2021    Ectopic pregnancy  2022    History of spinal fusion for scoliosis          ----  Home medications:    ----  FAMILY HISTORY:  No pertinent family history in first degree relatives        ----  Allergies    No Known Allergies    Intolerances        ----  Social History:  - etoh: occasional   - tobacco: denies   - recreational drug use: occasional   - lives at home    - ADLs and ambulates: independently     ----  REVIEW OF SYSTEMS:  CONSTITUTIONAL: denies fever, chills, fatigue, weakness  HEENT: denies blurred vision, sore throat  CARDIOVASCULAR: denies chest pain, chest pressure, palpitations  RESPIRATORY: denies shortness of breath, sputum production  GASTROINTESTINAL: +lower abdominal pain; denies nausea, vomiting, diarrhea   GENITOURINARY: denies dysuria, discharge  NEUROLOGICAL: denies numbness, headache, focal weakness  MUSCULOSKELETAL: denies new joint pain, muscle aches  HEMATOLOGIC: denies gross bleeding, bruising  LYMPHATICS: denies enlarged lymph nodes, extremity swelling    ----  T(C): 35.6 (22 @ 12:00), Max: 36.9 (22 @ 20:00)  HR: 80 (22 @ 15:00) (68 - 89)  BP: 109/72 (22 @ 15:00) (92/65 - 123/76)  RR: 25 (22 @ 15:00) (13 - 36)  SpO2: 96% (22 @ 15:00) (92% - 99%)  Wt(kg): --    PHYSICAL EXAM:  GENERAL: patient appears well, no acute distress, appropriately interactive  HEENT: NCAT, EOMI, MMM, conjunctive clear, neck supple   LUNGS: good air entry bilaterally, clear to auscultation, symmetric breath sounds, no wheezing or rhonchi appreciated  HEART: soft S1/S2, regular rate and rhythm, no murmurs noted, no noted edema to b/l LE  GASTROINTESTINAL: abdomen is soft, mild TTP lower quadrants, nondistended, dressings c/d/i with overlying abdominal binder   MUSCULOSKELETAL: no clubbing or cyanosis, no obvious deformity  NEUROLOGIC: awake, alert, oriented x3, non-focal   INTEGUMENT: good skin turgor, appropriate for ethnicity, appears well perfused, no jaundice noted  ----  I&O's Summary    2022 07:01  -  01 Mar 2022 07:00  --------------------------------------------------------  IN: 1519.8 mL / OUT: 1270 mL / NET: 249.8 mL    01 Mar 2022 07:01  -  01 Mar 2022 15:33  --------------------------------------------------------  IN: 360 mL / OUT: 0 mL / NET: 360 mL        LABS:                        9.2    7.86  )-----------( 223      ( 01 Mar 2022 05:22 )             27.5     03-    141  |  110<H>  |  9   ----------------------------<  94  3.7   |  26  |  0.57    Ca    7.9<L>      01 Mar 2022 05:22  Phos  3.1     03-  Mg     2.1     -    TPro  5.6<L>  /  Alb  2.5<L>  /  TBili  0.4  /  DBili  x   /  AST  34  /  ALT  20  /  AlkPhos  48  -        CAPILLARY BLOOD GLUCOSE          -25 @ 18:34   No growth to date.  --  --            ----  Personally reviewed:  Vital sign trends: [ x ] yes    [  ] no     [  ] n/a  Laboratory results: [ x ] yes    [  ] no     [  ] n/a  Radiology results: [ x ] yes    [  ] no     [  ] n/a  Culture results: [ x ] yes    [  ] no     [  ] n/a  Consultant recommendations: [ x ] yes    [  ] no     [  ] n/a         Patient is a 31y old  Female who presents with a chief complaint of elective surgery (2022 11:04)    FROM ICU ADMISSION H&P  Patient is a 30 yo female with a pmh of anxiety, missed  and ovarian cyst who presented to [facility] for an elective D&C hysteroscopy and laparoscopic right ovarian cystectomy. Patient in OR with injury to aorta resulting in increased blood loss, primary repair of injury and open approach to cyst removal. Prior to OR patient with hgb of 13.7, repeat in OR 9.3, WBC from 6.56 to 26.24. ABG obtained showing 7.33/co2 39/po2 351/hco3 21. ICU consulted for post-operative care as patient requiring multiple PRBC transfusions, hemodynamic instability and post-operative retroperitoneal bleed. In OR patient received x2 PRBC, and 3.5L IV crystalloid with 325 of UO. Decision was made collaboratively to leave patient intubated in the post operative stage.   Upon arrival of to ICU, patient is intubated with abdominal binder in place. Patient escorted by anesthesia and GYN attending. Patient is awakening to stimulation and following commands. L arterial line in place. Patient tachycardic and normotensive on ECG monitor. Subsequent hgb 10.9 with wbc of 35.58.  Patient to be admitted to ICU with plans of extubation once optimized.   Addendum: Patient with acute hypotension, worsening hemorrhagic shock, MTP initiated. CT with contrast obtained. See plan and assessment below.   ----  INTERVAL HPI/OVERNIGHT EVENTS: Patient extubated on 28. Patient is s/p MTP in ICU with 5 prbc, 1 ffp, 1 plt. H/H stabilized, dvt ppx restarted last night. Pt seen and evaluated at the bedside. Patient with lower abdominal pain, worse on the left but states that pain is well controlled on pain regimen. +BMs, passing flatus, urinating without any pain or difficulty. Endorses increased mucus production related to ET tube. Also reports occasional feelings of anxiety and SOB. No acute overnight events occurred.     ----  PAST MEDICAL & SURGICAL HISTORY:  Moderate anxiety    Missed   2021    Ectopic pregnancy  2022    History of spinal fusion for scoliosis          ----  Home medications:    ----  FAMILY HISTORY:  No pertinent family history in first degree relatives        ----  Allergies    No Known Allergies    Intolerances        ----  Social History:  - etoh: occasional   - tobacco: denies   - recreational drug use: occasional   - lives at home    - ADLs and ambulates: independently     ----  REVIEW OF SYSTEMS:  CONSTITUTIONAL: denies fever, chills, fatigue, weakness  HEENT: denies blurred vision, sore throat  CARDIOVASCULAR: denies chest pain, chest pressure, palpitations  RESPIRATORY: + cough, sputum production, intermittent shortness of breath  GASTROINTESTINAL: +lower abdominal pain; denies nausea, vomiting, diarrhea   GENITOURINARY: denies dysuria, discharge  NEUROLOGICAL: denies numbness, headache, focal weakness  MUSCULOSKELETAL: denies new joint pain, muscle aches  HEMATOLOGIC: denies gross bleeding, bruising  LYMPHATICS: denies enlarged lymph nodes, extremity swelling    ----  T(C): 35.6 (22 @ 12:00), Max: 36.9 (22 @ 20:00)  HR: 80 (22 @ 15:00) (68 - 89)  BP: 109/72 (22 @ 15:00) (92/65 - 123/76)  RR: 25 (22 @ 15:00) (13 - 36)  SpO2: 96% (22 @ 15:00) (92% - 99%)  Wt(kg): --    PHYSICAL EXAM:  GENERAL: patient appears well, no acute distress, appropriately interactive  HEENT: NCAT, EOMI, MMM, conjunctive clear, neck supple   LUNGS: good air entry bilaterally, clear to auscultation, symmetric breath sounds, no wheezing or rhonchi appreciated  HEART: soft S1/S2, regular rate and rhythm, no murmurs noted, no noted edema to b/l LE  GASTROINTESTINAL: abdomen is soft, mild TTP lower quadrants, nondistended, dressings c/d/i with overlying abdominal binder   MUSCULOSKELETAL: no clubbing or cyanosis, no obvious deformity  NEUROLOGIC: awake, alert, oriented x3, non-focal   INTEGUMENT: good skin turgor, appropriate for ethnicity, appears well perfused, no jaundice noted  ----  I&O's Summary    2022 07:01  -  01 Mar 2022 07:00  --------------------------------------------------------  IN: 1519.8 mL / OUT: 1270 mL / NET: 249.8 mL    01 Mar 2022 07:01  -  01 Mar 2022 15:33  --------------------------------------------------------  IN: 360 mL / OUT: 0 mL / NET: 360 mL        LABS:                        9.2    7.86  )-----------( 223      ( 01 Mar 2022 05:22 )             27.5     03-    141  |  110<H>  |  9   ----------------------------<  94  3.7   |  26  |  0.57    Ca    7.9<L>      01 Mar 2022 05:22  Phos  3.1     03-  Mg     2.1     -    TPro  5.6<L>  /  Alb  2.5<L>  /  TBili  0.4  /  DBili  x   /  AST  34  /  ALT  20  /  AlkPhos  48  03-        CAPILLARY BLOOD GLUCOSE          02-25 @ 18:34   No growth to date.  --  --            ----  Personally reviewed:  Vital sign trends: [ x ] yes    [  ] no     [  ] n/a  Laboratory results: [ x ] yes    [  ] no     [  ] n/a  Radiology results: [ x ] yes    [  ] no     [  ] n/a  Culture results: [ x ] yes    [  ] no     [  ] n/a  Consultant recommendations: [ x ] yes    [  ] no     [  ] n/a         Patient is a 31y old  Female who presents with a chief complaint of elective surgery (2022 11:04)    FROM ICU ADMISSION H&P  Patient is a 30 yo female with a pmh of anxiety, missed  and ovarian cyst who presented to [facility] for an elective D&C hysteroscopy and laparoscopic right ovarian cystectomy. Patient in OR with injury to aorta resulting in increased blood loss, primary repair of injury and open approach to cyst removal. Prior to OR patient with hgb of 13.7, repeat in OR 9.3, WBC from 6.56 to 26.24. ABG obtained showing 7.33/co2 39/po2 351/hco3 21. ICU consulted for post-operative care as patient requiring multiple PRBC transfusions, hemodynamic instability and post-operative retroperitoneal bleed. In OR patient received x2 PRBC, and 3.5L IV crystalloid with 325 of UO. Decision was made collaboratively to leave patient intubated in the post operative stage.   Upon arrival of to ICU, patient is intubated with abdominal binder in place. Patient escorted by anesthesia and GYN attending. Patient is awakening to stimulation and following commands. L arterial line in place. Patient tachycardic and normotensive on ECG monitor. Subsequent hgb 10.9 with wbc of 35.58.  Patient to be admitted to ICU with plans of extubation once optimized.   Addendum: Patient with acute hypotension, worsening hemorrhagic shock, MTP initiated. CT with contrast obtained. See plan and assessment below.   ----  INTERVAL HPI/OVERNIGHT EVENTS: Patient extubated on 28. Patient is s/p massive transfusion protocol in ICU with 5 prbc, 1 ffp, 1 plt. H/H stabilized, dvt ppx restarted last night. Pt seen and evaluated at the bedside. Patient with lower abdominal pain, worse on the left but states that pain is well controlled on pain regimen. +BMs, passing flatus, urinating without any pain or difficulty. Endorses increased mucus production related to ET tube. Also reports occasional feelings of anxiety and SOB. No acute overnight events occurred.     ----  PAST MEDICAL & SURGICAL HISTORY:  Moderate anxiety    Missed   2021    Ectopic pregnancy  2022    History of spinal fusion for scoliosis          ----  Home medications:  occasional Xanax PRN  -stopped taking paxil    ----  FAMILY HISTORY:  No pertinent family history in first degree relatives        ----  Allergies    No Known Allergies    Intolerances:none        ----  Social History:  - etoh: occasional   - tobacco: denies   - recreational drug use: occasional   - lives at home    - ADLs and ambulates: independently     ----  REVIEW OF SYSTEMS:  CONSTITUTIONAL: denies fever, chills, fatigue, weakness  HEENT: denies blurred vision, sore throat  CARDIOVASCULAR: denies chest pain, chest pressure, palpitations  RESPIRATORY: + cough, sputum production, intermittent shortness of breath  GASTROINTESTINAL: +lower abdominal pain; denies nausea, vomiting, diarrhea, melena, hematochezia  GENITOURINARY: denies dysuria, discharge  NEUROLOGICAL: denies numbness, headache, focal weakness  MUSCULOSKELETAL: denies new joint pain, muscle aches  HEMATOLOGIC: denies gross bleeding, bruising  LYMPHATICS: denies enlarged lymph nodes, extremity swelling    ----  T(C): 35.6 (22 @ 12:00), Max: 36.9 (22 @ 20:00)  HR: 80 (22 @ 15:00) (68 - 89)  BP: 109/72 (22 @ 15:00) (92/65 - 123/76)  RR: 25 (22 @ 15:00) (13 - 36)  SpO2: 96% (22 @ 15:00) (92% - 99%)  Wt(kg): --    PHYSICAL EXAM:  GENERAL: patient appears well, no acute distress, appropriately interactive  HEENT: NCAT, EOMI, MMM, conjunctive clear, neck supple   LUNGS: good air entry bilaterally, clear to auscultation, symmetric breath sounds, no wheezing or rhonchi appreciated  HEART: soft S1/S2, regular rate and rhythm, no murmurs noted, no noted edema to b/l LE  GASTROINTESTINAL: abdomen is soft, mild TTP lower quadrants, nondistended, dressings c/d/i with overlying abdominal binder   MUSCULOSKELETAL: no clubbing or cyanosis, no obvious deformity  NEUROLOGIC: awake, alert, oriented x3, non-focal   INTEGUMENT: good skin turgor, appropriate for ethnicity, appears well perfused, no jaundice noted  ----  I&O's Summary    2022 07:01  -  01 Mar 2022 07:00  --------------------------------------------------------  IN: 1519.8 mL / OUT: 1270 mL / NET: 249.8 mL    01 Mar 2022 07:01  -  01 Mar 2022 15:33  --------------------------------------------------------  IN: 360 mL / OUT: 0 mL / NET: 360 mL        LABS:                        9.2    7.86  )-----------( 223      ( 01 Mar 2022 05:22 )             27.5     03-01    141  |  110<H>  |  9   ----------------------------<  94  3.7   |  26  |  0.57    Ca    7.9<L>      01 Mar 2022 05:22  Phos  3.1     03-  Mg     2.1     -    TPro  5.6<L>  /  Alb  2.5<L>  /  TBili  0.4  /  DBili  x   /  AST  34  /  ALT  20  /  AlkPhos  48  03-        CAPILLARY BLOOD GLUCOSE          02-25 @ 18:34   No growth to date.  --  --            ----  Personally reviewed:  Vital sign trends: [ x ] yes    [  ] no     [  ] n/a  Laboratory results: [ x ] yes    [  ] no     [  ] n/a  Radiology results: [ x ] yes    [  ] no     [  ] n/a  Culture results: [ x ] yes    [  ] no     [  ] n/a  Consultant recommendations: [ x ] yes    [  ] no     [  ] n/a

## 2022-03-01 NOTE — PROGRESS NOTE ADULT - ATTENDING COMMENTS
30 yo woman with Hx anxiety, ovarian cyst POD#4 s/p laparoscopic ovarian cystectomy complicated by aortic injury with L retroperitoneal hematoma s/p MTP with total 5unit pRBC/1plt/1FFP.  Continues to do well.      --reduced narcotic requirement  continue motrin 400mg q6h x 4 doses, then reassess  continue tylenol for mild pain, PO dilaudid for moderate and IV dilaudid for severe pain  --remains hemodynamically stable  --stable from respiratory standpoint  incentive spirometer  --normal renal function  --tolerating PO diet, BM today, continue bowel regimen for now but d/c if having regular BMs  --continue empiric zosyn in setting of RPB  --acute blood loss anemia stabilized  daily CBC  DVT ppx with heparin SC  --OOB, ambulating  --plan discussed with pt and with gyn   --stable for floor with remote tele

## 2022-03-01 NOTE — CONSULT NOTE ADULT - PROBLEM SELECTOR RECOMMENDATION 2
Chronic, stable POD #4 laparoscopic ovarian cystectomy   - Pain regimen as ordered   - Incentive spirometry  - PRN Robitussin for cough and increased sputum associated with ET tube  - Bowel regimen - miralax and senna daily

## 2022-03-02 ENCOUNTER — TRANSCRIPTION ENCOUNTER (OUTPATIENT)
Age: 32
End: 2022-03-02

## 2022-03-02 LAB
ALBUMIN SERPL ELPH-MCNC: 2.7 G/DL — LOW (ref 3.3–5)
ALP SERPL-CCNC: 54 U/L — SIGNIFICANT CHANGE UP (ref 40–120)
ALT FLD-CCNC: 22 U/L — SIGNIFICANT CHANGE UP (ref 12–78)
ANION GAP SERPL CALC-SCNC: 7 MMOL/L — SIGNIFICANT CHANGE UP (ref 5–17)
AST SERPL-CCNC: 29 U/L — SIGNIFICANT CHANGE UP (ref 15–37)
BASOPHILS # BLD AUTO: 0.02 K/UL — SIGNIFICANT CHANGE UP (ref 0–0.2)
BASOPHILS NFR BLD AUTO: 0.2 % — SIGNIFICANT CHANGE UP (ref 0–2)
BILIRUB SERPL-MCNC: 0.6 MG/DL — SIGNIFICANT CHANGE UP (ref 0.2–1.2)
BUN SERPL-MCNC: 7 MG/DL — SIGNIFICANT CHANGE UP (ref 7–23)
CALCIUM SERPL-MCNC: 8.3 MG/DL — LOW (ref 8.5–10.1)
CHLORIDE SERPL-SCNC: 111 MMOL/L — HIGH (ref 96–108)
CO2 SERPL-SCNC: 23 MMOL/L — SIGNIFICANT CHANGE UP (ref 22–31)
CREAT SERPL-MCNC: 0.59 MG/DL — SIGNIFICANT CHANGE UP (ref 0.5–1.3)
CULTURE RESULTS: SIGNIFICANT CHANGE UP
CULTURE RESULTS: SIGNIFICANT CHANGE UP
EGFR: 123 ML/MIN/1.73M2 — SIGNIFICANT CHANGE UP
EOSINOPHIL # BLD AUTO: 0.14 K/UL — SIGNIFICANT CHANGE UP (ref 0–0.5)
EOSINOPHIL NFR BLD AUTO: 1.5 % — SIGNIFICANT CHANGE UP (ref 0–6)
GLUCOSE SERPL-MCNC: 99 MG/DL — SIGNIFICANT CHANGE UP (ref 70–99)
HCT VFR BLD CALC: 29.7 % — LOW (ref 34.5–45)
HGB BLD-MCNC: 10.1 G/DL — LOW (ref 11.5–15.5)
IMM GRANULOCYTES NFR BLD AUTO: 0.5 % — SIGNIFICANT CHANGE UP (ref 0–1.5)
LYMPHOCYTES # BLD AUTO: 1.25 K/UL — SIGNIFICANT CHANGE UP (ref 1–3.3)
LYMPHOCYTES # BLD AUTO: 13.5 % — SIGNIFICANT CHANGE UP (ref 13–44)
MAGNESIUM SERPL-MCNC: 2.1 MG/DL — SIGNIFICANT CHANGE UP (ref 1.6–2.6)
MCHC RBC-ENTMCNC: 29.9 PG — SIGNIFICANT CHANGE UP (ref 27–34)
MCHC RBC-ENTMCNC: 34 GM/DL — SIGNIFICANT CHANGE UP (ref 32–36)
MCV RBC AUTO: 87.9 FL — SIGNIFICANT CHANGE UP (ref 80–100)
MONOCYTES # BLD AUTO: 0.88 K/UL — SIGNIFICANT CHANGE UP (ref 0–0.9)
MONOCYTES NFR BLD AUTO: 9.5 % — SIGNIFICANT CHANGE UP (ref 2–14)
NEUTROPHILS # BLD AUTO: 6.94 K/UL — SIGNIFICANT CHANGE UP (ref 1.8–7.4)
NEUTROPHILS NFR BLD AUTO: 74.8 % — SIGNIFICANT CHANGE UP (ref 43–77)
NRBC # BLD: 0 /100 WBCS — SIGNIFICANT CHANGE UP (ref 0–0)
PHOSPHATE SERPL-MCNC: 3.5 MG/DL — SIGNIFICANT CHANGE UP (ref 2.5–4.5)
PLATELET # BLD AUTO: 272 K/UL — SIGNIFICANT CHANGE UP (ref 150–400)
POTASSIUM SERPL-MCNC: 3.6 MMOL/L — SIGNIFICANT CHANGE UP (ref 3.5–5.3)
POTASSIUM SERPL-SCNC: 3.6 MMOL/L — SIGNIFICANT CHANGE UP (ref 3.5–5.3)
PROT SERPL-MCNC: 5.9 G/DL — LOW (ref 6–8.3)
RBC # BLD: 3.38 M/UL — LOW (ref 3.8–5.2)
RBC # FLD: 12.9 % — SIGNIFICANT CHANGE UP (ref 10.3–14.5)
SODIUM SERPL-SCNC: 141 MMOL/L — SIGNIFICANT CHANGE UP (ref 135–145)
SPECIMEN SOURCE: SIGNIFICANT CHANGE UP
SPECIMEN SOURCE: SIGNIFICANT CHANGE UP
WBC # BLD: 9.28 K/UL — SIGNIFICANT CHANGE UP (ref 3.8–10.5)
WBC # FLD AUTO: 9.28 K/UL — SIGNIFICANT CHANGE UP (ref 3.8–10.5)

## 2022-03-02 PROCEDURE — 99233 SBSQ HOSP IP/OBS HIGH 50: CPT | Mod: GC

## 2022-03-02 RX ORDER — OXYCODONE HYDROCHLORIDE 5 MG/1
1 TABLET ORAL
Qty: 0 | Refills: 0 | DISCHARGE
Start: 2022-03-02

## 2022-03-02 RX ORDER — ALPRAZOLAM 0.25 MG
1 TABLET ORAL
Qty: 0 | Refills: 0 | DISCHARGE

## 2022-03-02 RX ORDER — ALPRAZOLAM 0.25 MG
0.25 TABLET ORAL DAILY
Refills: 0 | Status: DISCONTINUED | OUTPATIENT
Start: 2022-03-02 | End: 2022-03-03

## 2022-03-02 RX ORDER — LANOLIN ALCOHOL/MO/W.PET/CERES
3 CREAM (GRAM) TOPICAL AT BEDTIME
Refills: 0 | Status: DISCONTINUED | OUTPATIENT
Start: 2022-03-02 | End: 2022-03-02

## 2022-03-02 RX ORDER — LANOLIN ALCOHOL/MO/W.PET/CERES
5 CREAM (GRAM) TOPICAL AT BEDTIME
Refills: 0 | Status: DISCONTINUED | OUTPATIENT
Start: 2022-03-02 | End: 2022-03-03

## 2022-03-02 RX ORDER — IBUPROFEN 200 MG
600 TABLET ORAL EVERY 6 HOURS
Refills: 0 | Status: DISCONTINUED | OUTPATIENT
Start: 2022-03-02 | End: 2022-03-03

## 2022-03-02 RX ORDER — ACETAMINOPHEN 500 MG
2 TABLET ORAL
Qty: 0 | Refills: 0 | DISCHARGE
Start: 2022-03-02

## 2022-03-02 RX ORDER — OXYCODONE HYDROCHLORIDE 5 MG/1
5 TABLET ORAL EVERY 6 HOURS
Refills: 0 | Status: DISCONTINUED | OUTPATIENT
Start: 2022-03-02 | End: 2022-03-03

## 2022-03-02 RX ORDER — IBUPROFEN 200 MG
1 TABLET ORAL
Qty: 0 | Refills: 0 | DISCHARGE
Start: 2022-03-02

## 2022-03-02 RX ADMIN — HEPARIN SODIUM 5000 UNIT(S): 5000 INJECTION INTRAVENOUS; SUBCUTANEOUS at 14:57

## 2022-03-02 RX ADMIN — Medication 600 MILLIGRAM(S): at 11:53

## 2022-03-02 RX ADMIN — HEPARIN SODIUM 5000 UNIT(S): 5000 INJECTION INTRAVENOUS; SUBCUTANEOUS at 20:47

## 2022-03-02 RX ADMIN — Medication 1000 MILLIGRAM(S): at 21:30

## 2022-03-02 RX ADMIN — Medication 0.25 MILLIGRAM(S): at 20:47

## 2022-03-02 RX ADMIN — Medication 600 MILLIGRAM(S): at 12:53

## 2022-03-02 RX ADMIN — Medication 400 MILLIGRAM(S): at 01:25

## 2022-03-02 RX ADMIN — HEPARIN SODIUM 5000 UNIT(S): 5000 INJECTION INTRAVENOUS; SUBCUTANEOUS at 06:03

## 2022-03-02 RX ADMIN — PIPERACILLIN AND TAZOBACTAM 25 GRAM(S): 4; .5 INJECTION, POWDER, LYOPHILIZED, FOR SOLUTION INTRAVENOUS at 06:03

## 2022-03-02 RX ADMIN — Medication 1000 MILLIGRAM(S): at 20:47

## 2022-03-02 RX ADMIN — Medication 3 MILLIGRAM(S): at 01:38

## 2022-03-02 RX ADMIN — Medication 400 MILLIGRAM(S): at 06:03

## 2022-03-02 NOTE — PROGRESS NOTE ADULT - PROBLEM SELECTOR PLAN 1
- S/p MTP with total 5unit pRBC/1plt/1FFP   - H/H stable, pt hemodynamically stable   - Continue to trend H/H with daily CBC, transfuse hgb <7  - Monitor for s/s of active bleeding   - Pt completing course of Zosyn this morning  - Blood cultures - NGTD  - Vascular surgery recommendations appreciated. - S/p MTP with total 5unit pRBC/1plt/1FFP   - H/H stable, pt hemodynamically stable   - Continue to trend H/H with daily CBC, transfuse hgb <7  - Monitor for s/s of active bleeding   - Pt completing course of Zosyn this morning, monitor off abx  - Blood cultures - NGTD  - Vascular surgery recommendations appreciated.

## 2022-03-02 NOTE — PROGRESS NOTE ADULT - ASSESSMENT
30 yo s/p lap converted to open R ovarian cystectomy, chromotubation, repair of aorta  Transitioned to floor yesterday.   Pain well controlled   unable to sleep last night  Had a second BM  tolerating po intake   Patient encouraged to continue po intake   Ambulation encouraged   antibiotics discontinued   continue heparin  Melatonin PRN  Xanax PRN   Will followup AM CBC

## 2022-03-02 NOTE — PROGRESS NOTE ADULT - SUBJECTIVE AND OBJECTIVE BOX
Patient is a 31y old  Female who presents with a chief complaint of elective surgery (26 Feb 2022 11:04)    INTERVAL HPI/OVERNIGHT EVENTS: No acute overnight events, pt was downgraded from the ICU to med/surg floor yesterday. Pt seen and examined at the bedside this AM. She reports feeling well, however had difficulty sleeping due to anxiety and change of room. She took melatonin but did not use prn Xanax. She denies any fevers chills, chest pain, shortness of breath, lightheadedness/dizziness. She has occasional abd pain that she thinks is due to bloating/gas. She has no other complaints or concerns at this time.     MEDICATIONS  (STANDING):  chlorhexidine 2% Cloths 1 Application(s) Topical <User Schedule>  heparin   Injectable 5000 Unit(s) SubCutaneous every 8 hours  melatonin 5 milliGRAM(s) Oral at bedtime  polyethylene glycol 3350 17 Gram(s) Oral daily  senna 2 Tablet(s) Oral at bedtime    MEDICATIONS  (PRN):  acetaminophen     Tablet .. 1000 milliGRAM(s) Oral every 8 hours PRN Mild Pain (1 - 3)  ALPRAZolam 0.25 milliGRAM(s) Oral daily PRN aniexty/restlessness  guaiFENesin Oral Liquid (Sugar-Free) 100 milliGRAM(s) Oral every 6 hours PRN secretion  ibuprofen  Tablet. 600 milliGRAM(s) Oral every 6 hours PRN Moderate Pain (4 - 6)  oxyCODONE    IR 5 milliGRAM(s) Oral every 6 hours PRN Severe Pain (7 - 10)    Allergies  No Known Allergies    Intolerances    REVIEW OF SYSTEMS:  CONSTITUTIONAL: No fever or chills; + Insomnia  HEENT:  No headache, no sore throat  RESPIRATORY: No cough, wheezing, or shortness of breath  CARDIOVASCULAR: No chest pain, palpitations  GASTROINTESTINAL: + Abd pain, bloating; No nausea, vomiting, or diarrhea  GENITOURINARY: No dysuria, frequency, or hematuria  NEUROLOGICAL: no focal weakness or dizziness  MUSCULOSKELETAL: no myalgias   PSYCH: + Anxiety    Vital Signs Last 24 Hrs  T(C): 36.7 (02 Mar 2022 04:00), Max: 37.3 (01 Mar 2022 20:06)  T(F): 98 (02 Mar 2022 04:00), Max: 99.1 (01 Mar 2022 20:06)  HR: 80 (02 Mar 2022 07:39) (75 - 87)  BP: 117/63 (02 Mar 2022 04:00) (92/65 - 135/85)  BP(mean): 91 (01 Mar 2022 18:00) (74 - 91)  RR: 17 (02 Mar 2022 04:00) (17 - 27)  SpO2: 95% (02 Mar 2022 04:00) (93% - 98%)    PHYSICAL EXAM:  GENERAL: Lying in bed in NAD  HEENT:  anicteric, moist mucous membranes  CHEST/LUNG:  CTA b/l, no rales, wheezes, or rhonchi  HEART:  RRR, S1, S2  ABDOMEN:  BS+, soft, mild tenderness in lower quadrants, nondistended; dressings with abd binder overlying, C/D/I  EXTREMITIES: no edema, cyanosis, or calf tenderness  NERVOUS SYSTEM: answers questions and follows commands appropriately    LABS:                        10.1   9.28  )-----------( 272      ( 02 Mar 2022 07:57 )             29.7     CBC Full  -  ( 02 Mar 2022 07:57 )  WBC Count : 9.28 K/uL  Hemoglobin : 10.1 g/dL  Hematocrit : 29.7 %  Platelet Count - Automated : 272 K/uL  Mean Cell Volume : 87.9 fl  Mean Cell Hemoglobin : 29.9 pg  Mean Cell Hemoglobin Concentration : 34.0 gm/dL  Auto Neutrophil # : 6.94 K/uL  Auto Lymphocyte # : 1.25 K/uL  Auto Monocyte # : 0.88 K/uL  Auto Eosinophil # : 0.14 K/uL  Auto Basophil # : 0.02 K/uL  Auto Neutrophil % : 74.8 %  Auto Lymphocyte % : 13.5 %  Auto Monocyte % : 9.5 %  Auto Eosinophil % : 1.5 %  Auto Basophil % : 0.2 %    02 Mar 2022 07:57    141    |  111    |  7      ----------------------------<  99     3.6     |  23     |  0.59     Ca    8.3        02 Mar 2022 07:57  Phos  3.5       02 Mar 2022 07:57  Mg     2.1       02 Mar 2022 07:57    TPro  5.9    /  Alb  2.7    /  TBili  0.6    /  DBili  x      /  AST  29     /  ALT  22     /  AlkPhos  54     02 Mar 2022 07:57    Culture - Blood (collected 02-25-22 @ 18:34)  Source: .Blood Blood  Preliminary Report (02-26-22 @ 19:02):    No growth to date.    Culture - Blood (collected 02-25-22 @ 18:34)  Source: .Blood Blood  Preliminary Report (02-26-22 @ 19:02):    No growth to date.    RADIOLOGY & ADDITIONAL TESTS:  Personally reviewed.     Consultant(s) Notes Reviewed:  [x] YES  [ ] NO

## 2022-03-02 NOTE — PROGRESS NOTE ADULT - SUBJECTIVE AND OBJECTIVE BOX
INTERVAL HPI/OVERNIGHT EVENTS: Pt seen and examined at bedside.  Pt complains of insomnia and restlessness  She denies nausea, vomiting, severe abdominal pain    MEDICATIONS  (STANDING):  chlorhexidine 2% Cloths 1 Application(s) Topical <User Schedule>  heparin   Injectable 5000 Unit(s) SubCutaneous every 8 hours  melatonin 3 milliGRAM(s) Oral at bedtime  polyethylene glycol 3350 17 Gram(s) Oral daily  senna 2 Tablet(s) Oral at bedtime    MEDICATIONS  (PRN):  acetaminophen     Tablet .. 1000 milliGRAM(s) Oral every 8 hours PRN Mild Pain (1 - 3)  ALPRAZolam 0.25 milliGRAM(s) Oral daily PRN aniexty/restlessness  guaiFENesin Oral Liquid (Sugar-Free) 100 milliGRAM(s) Oral every 6 hours PRN secretion  ibuprofen  Tablet. 600 milliGRAM(s) Oral every 6 hours PRN Moderate Pain (4 - 6)  oxyCODONE    IR 5 milliGRAM(s) Oral every 6 hours PRN Severe Pain (7 - 10)      Vital Signs Last 24 Hrs  T(C): 36.7 (02 Mar 2022 04:00), Max: 37.3 (01 Mar 2022 20:06)  T(F): 98 (02 Mar 2022 04:00), Max: 99.1 (01 Mar 2022 20:06)  HR: 75 (02 Mar 2022 04:00) (75 - 87)  BP: 117/63 (02 Mar 2022 04:00) (92/65 - 135/85)  BP(mean): 91 (01 Mar 2022 18:00) (74 - 91)  RR: 17 (02 Mar 2022 04:00) (17 - 27)  SpO2: 95% (02 Mar 2022 04:00) (93% - 98%)    PHYSICAL EXAM:    GA: NAD, A+0 x 3  Resp: normal effort  Abd: ( + ) BS, soft, nontender, nondistended, no rebound or guarding,   Incision: clean, dry and intact; steri-strips in place  Neuro: A & O X 3      LABS:                        9.2    7.86  )-----------( 223      ( 01 Mar 2022 05:22 )             27.5     03-01    141  |  110<H>  |  9   ----------------------------<  94  3.7   |  26  |  0.57    Ca    7.9<L>      01 Mar 2022 05:22  Phos  3.1     03-01  Mg     2.1     03-01    TPro  5.6<L>  /  Alb  2.5<L>  /  TBili  0.4  /  DBili  x   /  AST  34  /  ALT  20  /  AlkPhos  48  03-01          RADIOLOGY & ADDITIONAL TESTS:

## 2022-03-02 NOTE — DISCHARGE NOTE PROVIDER - CARE PROVIDER_API CALL
Tabby Valiente)  Obstetrics and Gynecology  [address] [place] [address]  Phone: [phone]  Fax: [phone]  Established Patient  Follow Up Time: 1-3 days

## 2022-03-02 NOTE — PROGRESS NOTE ADULT - ASSESSMENT
Patient is a 30 yo female with a pmh of anxiety, missed  and ovarian cyst POD#5 s/p laparoscopic ovarian cystectomy complicated by aortic injury with L retroperitoneal hematoma s/p MTP with total 5unit pRBC/1plt/1FFP. Medicine consulted for medical management of acute blood loss anemia 2/2 L retroperitoneal hematoma.

## 2022-03-02 NOTE — DISCHARGE NOTE PROVIDER - HOSPITAL COURSE
30 yo scheduled for laparoscopic right ovarian cystectomy, chromotubation, D& C hysteroscopy on 2/25. D & C hysteroscopy performed without complications. Upon laparoscopic entry into the abdomen brisk bleeding noted requiring conversion to laparotomy. Intraop general surgery and vascular surgery consults called. Vascular surgery performed primary repair of aortic injury .  Left sided retroperitoneal hematoma noted- non expanding. Two 2prbcs given intraop. Chromotubation performed with patency of both fallopian tubes noted. Right ovarian cystectomy performed and cyst wall sent to pathology. No injury to uterus, fallopian tubes or ovaries. Patient was transferred to the ICU intubated. Upon arrival to the ICU patient became hypotensive requiring MTP and pressure support. An additional 3 prbcs and ffp given. 30 yo scheduled for laparoscopic right ovarian cystectomy, chromotubation, D& C hysteroscopy on 2/25. D & C hysteroscopy performed without complications. Upon laparoscopic entry into the abdomen brisk bleeding noted requiring conversion to laparotomy. Intraop general surgery and vascular surgery consults called. Vascular surgery performed primary repair of aortic injury .  Left sided retroperitoneal hematoma noted- non expanding. Two 2prbcs given intraop. Chromotubation performed with patency of both fallopian tubes noted. Right ovarian cystectomy performed and cyst wall sent to pathology. No injury to uterus, fallopian tubes or ovaries. Patient was transferred to the ICU intubated. Upon arrival to the ICU patient became hypotensive requiring MTP and pressure support. An additional 3 prbcs and ffp given. CT performed showing retroperitoneal hematoma without extravasation. On POD #1 patient was extubated successfully. Patient remained hemodynamically stable non longer requiring pressure support or further transfusions. 30 yo scheduled for laparoscopic right ovarian cystectomy, chromotubation, D& C hysteroscopy on 2/25. D & C hysteroscopy performed without complications. Upon laparoscopic entry into the abdomen brisk bleeding noted requiring conversion to laparotomy. Intraop general surgery and vascular surgery consults called. Vascular surgery performed primary repair of aortic injury .  Left sided retroperitoneal hematoma noted- non expanding. Two 2prbcs given intraop. Chromotubation performed with patency of both fallopian tubes noted. Right ovarian cystectomy performed and cyst wall sent to pathology. No injury to uterus, fallopian tubes or ovaries. Patient was transferred to the ICU intubated. Upon arrival to the ICU patient became hypotensive requiring MTP and pressure support. An additional 3 prbcs and ffp given. CT performed showing retroperitoneal hematoma without extravasation. On POD #1 3/26 patient was extubated successfully. Patient remained hemodynamically stable non longer requiring pressure support or further transfusions. Patient was slowly transitioned to clear diet and tolerated, evans removed and successful trial of void. 3/1 Patient was transitioned to regular floor. Tolerating regular diet, ambulation, pain control optimized . Patient to be discharged on 3/3 with followup in the office on 3/8 32 yo scheduled for laparoscopic right ovarian cystectomy, chromotubation, D& C hysteroscopy on 2/25. D & C hysteroscopy performed without complications. Upon laparoscopic entry into the abdomen brisk bleeding noted requiring conversion to laparotomy. Intraop general surgery and vascular surgery consults called. Vascular surgery performed primary repair of aortic injury .  Left sided retroperitoneal hematoma noted- non expanding. Two 2prbcs given intraop. Chromotubation performed with patency of both fallopian tubes noted. Right ovarian cystectomy performed and cyst wall sent to pathology. No injury to uterus, fallopian tubes or ovaries. Patient was transferred to the ICU intubated. Upon arrival to the ICU patient became hypotensive requiring MTP and pressure support. An additional 3 prbcs and 1ffp  1plt given. CT performed showing retroperitoneal hematoma without extravasation. On POD #1  patient was extubated successfully. Patient remained hemodynamically stable non longer requiring pressure support or further transfusions. Patient was slowly transitioned to clear diet and tolerated, evans removed and successful trial of void. Patient was transitioned to regular floor. Tolerating regular diet, +BMs, ambulation, pain control optimized . Patient to be discharged on 3/3 with followup in the office on 3/8

## 2022-03-02 NOTE — PROGRESS NOTE ADULT - ATTENDING COMMENTS
The patient was seen and evaluated independently by the attending physician.  - I have personally reviewed the pt's labs, imaging, micro data and consultant recommendations.    The patient was seen and examined at bedside. She reports overall feeling better. She did not sleep well last night and she is hoping to discuss discharge planning as soon as possible. She spoke with GYN attending this morning and they are considering discharge in the next 24 to 48 hours. She denies chest pain or palpitations. She has had no clinical evidence of any blood loss. She still reports left thigh left hip stiffness and discomfort but she states that each day this has been slightly improved when compared to the day prior. She has had no fever or chills. Denies lightheadedness or focal weakness. She states that she has been ambulating back and forth to the restroom without any significant limitation. She does admit reduced exercise tolerance in general     vital signs were reviewed and stable the last 24 hours   generally the patient is non-toxic appearing, mild pallor noted   moist mucous membranes no oral lesions visualized   lungs are clear to auscultation bilaterally, no wheezing   S1 S2 no murmurs regular rhythm   abdomen is softly distended with minimal tenderness noted in the left lower quadrant. Hypoactive bowel sounds. Dressings with abdominal binder are in place   peripheral extremities are warm and symmetric. Cap refill on digits of both feet is less than 2 seconds. She has palpable 2 + dorsalis pedis pulse bilaterally      CBC: hemoglobin and hematocrit have been stable over the previous 72 Plus hours   CMP: essentially unremarkable   Micro: blood cultures are negative from a mission   Radiology: there has been no new Imaging since February 25th    #Acute blood loss anemia requiring massive transfusion protocol  #Status post ovarian cystectomy, post-operative day number 5  #Generalized anxiety  #Reduced exercise tolerance    - The patient remains hemodynamically stable and is appropriate to discuss dispo planning.   - I reviewed the overall plan of care with the primary team today. Planning to reassess the patient this evening and if symptoms remain well-controlled in combination with stable labs tomorrow morning we'll plan for discharge tomorrow with close outpatient follow up and close monitoring of clinical symptoms  - The patient has had an emotionally challenging few days since admission. I reviewed counseling with social work team. I also reviewed the patient's future fertility with GYN and there are no special considerations that need to be in place for the patient moving forward. The chromotubation performed during procedure was essentially unremarkable and she has patent fallopian tubes   - Emotional support and counselling provided at the bedside

## 2022-03-02 NOTE — DISCHARGE NOTE PROVIDER - NSDCCPCAREPLAN_GEN_ALL_CORE_FT
PRINCIPAL DISCHARGE DIAGNOSIS  Diagnosis: S/P ovarian cystectomy  Assessment and Plan of Treatment:       SECONDARY DISCHARGE DIAGNOSES  Diagnosis: Anemia due to acute blood loss  Assessment and Plan of Treatment:

## 2022-03-02 NOTE — DISCHARGE NOTE PROVIDER - NSDCMRMEDTOKEN_GEN_ALL_CORE_FT
acetaminophen 500 mg oral tablet: 2 tab(s) orally every 8 hours, As needed, Mild Pain (1 - 3)  ibuprofen 600 mg oral tablet: 1 tab(s) orally every 6 hours, As needed, Moderate Pain (4 - 6)  oxyCODONE 5 mg oral tablet: 1 tab(s) orally every 6 hours, As needed, Severe Pain (7 - 10)

## 2022-03-03 ENCOUNTER — TRANSCRIPTION ENCOUNTER (OUTPATIENT)
Age: 32
End: 2022-03-03

## 2022-03-03 VITALS
SYSTOLIC BLOOD PRESSURE: 105 MMHG | DIASTOLIC BLOOD PRESSURE: 74 MMHG | HEART RATE: 81 BPM | TEMPERATURE: 98 F | OXYGEN SATURATION: 97 % | RESPIRATION RATE: 15 BRPM

## 2022-03-03 LAB
ALBUMIN SERPL ELPH-MCNC: 2.7 G/DL — LOW (ref 3.3–5)
ALP SERPL-CCNC: 57 U/L — SIGNIFICANT CHANGE UP (ref 40–120)
ALT FLD-CCNC: 23 U/L — SIGNIFICANT CHANGE UP (ref 12–78)
ANION GAP SERPL CALC-SCNC: 8 MMOL/L — SIGNIFICANT CHANGE UP (ref 5–17)
AST SERPL-CCNC: 24 U/L — SIGNIFICANT CHANGE UP (ref 15–37)
BILIRUB SERPL-MCNC: 0.6 MG/DL — SIGNIFICANT CHANGE UP (ref 0.2–1.2)
BUN SERPL-MCNC: 7 MG/DL — SIGNIFICANT CHANGE UP (ref 7–23)
CALCIUM SERPL-MCNC: 8.3 MG/DL — LOW (ref 8.5–10.1)
CHLORIDE SERPL-SCNC: 107 MMOL/L — SIGNIFICANT CHANGE UP (ref 96–108)
CO2 SERPL-SCNC: 23 MMOL/L — SIGNIFICANT CHANGE UP (ref 22–31)
CREAT SERPL-MCNC: 0.61 MG/DL — SIGNIFICANT CHANGE UP (ref 0.5–1.3)
EGFR: 122 ML/MIN/1.73M2 — SIGNIFICANT CHANGE UP
GLUCOSE SERPL-MCNC: 92 MG/DL — SIGNIFICANT CHANGE UP (ref 70–99)
HCT VFR BLD CALC: 30.4 % — LOW (ref 34.5–45)
HGB BLD-MCNC: 10.4 G/DL — LOW (ref 11.5–15.5)
MCHC RBC-ENTMCNC: 30.2 PG — SIGNIFICANT CHANGE UP (ref 27–34)
MCHC RBC-ENTMCNC: 34.2 GM/DL — SIGNIFICANT CHANGE UP (ref 32–36)
MCV RBC AUTO: 88.4 FL — SIGNIFICANT CHANGE UP (ref 80–100)
NRBC # BLD: 0 /100 WBCS — SIGNIFICANT CHANGE UP (ref 0–0)
PLATELET # BLD AUTO: 318 K/UL — SIGNIFICANT CHANGE UP (ref 150–400)
POTASSIUM SERPL-MCNC: 3.8 MMOL/L — SIGNIFICANT CHANGE UP (ref 3.5–5.3)
POTASSIUM SERPL-SCNC: 3.8 MMOL/L — SIGNIFICANT CHANGE UP (ref 3.5–5.3)
PROT SERPL-MCNC: 6.3 G/DL — SIGNIFICANT CHANGE UP (ref 6–8.3)
RBC # BLD: 3.44 M/UL — LOW (ref 3.8–5.2)
RBC # FLD: 13.1 % — SIGNIFICANT CHANGE UP (ref 10.3–14.5)
SODIUM SERPL-SCNC: 138 MMOL/L — SIGNIFICANT CHANGE UP (ref 135–145)
WBC # BLD: 11.71 K/UL — HIGH (ref 3.8–10.5)
WBC # FLD AUTO: 11.71 K/UL — HIGH (ref 3.8–10.5)

## 2022-03-03 PROCEDURE — 36430 TRANSFUSION BLD/BLD COMPNT: CPT

## 2022-03-03 PROCEDURE — 99233 SBSQ HOSP IP/OBS HIGH 50: CPT | Mod: GC

## 2022-03-03 PROCEDURE — C1889: CPT

## 2022-03-03 PROCEDURE — 84100 ASSAY OF PHOSPHORUS: CPT

## 2022-03-03 PROCEDURE — 74174 CTA ABD&PLVS W/CONTRAST: CPT

## 2022-03-03 PROCEDURE — 86850 RBC ANTIBODY SCREEN: CPT

## 2022-03-03 PROCEDURE — 85018 HEMOGLOBIN: CPT

## 2022-03-03 PROCEDURE — 83605 ASSAY OF LACTIC ACID: CPT

## 2022-03-03 PROCEDURE — P9016: CPT

## 2022-03-03 PROCEDURE — 84145 PROCALCITONIN (PCT): CPT

## 2022-03-03 PROCEDURE — 82803 BLOOD GASES ANY COMBINATION: CPT

## 2022-03-03 PROCEDURE — 83735 ASSAY OF MAGNESIUM: CPT

## 2022-03-03 PROCEDURE — 31720 CLEARANCE OF AIRWAYS: CPT

## 2022-03-03 PROCEDURE — 85014 HEMATOCRIT: CPT

## 2022-03-03 PROCEDURE — 85025 COMPLETE CBC W/AUTO DIFF WBC: CPT

## 2022-03-03 PROCEDURE — 71045 X-RAY EXAM CHEST 1 VIEW: CPT

## 2022-03-03 PROCEDURE — P9059: CPT

## 2022-03-03 PROCEDURE — 80053 COMPREHEN METABOLIC PANEL: CPT

## 2022-03-03 PROCEDURE — 87040 BLOOD CULTURE FOR BACTERIA: CPT

## 2022-03-03 PROCEDURE — 86923 COMPATIBILITY TEST ELECTRIC: CPT

## 2022-03-03 PROCEDURE — 36415 COLL VENOUS BLD VENIPUNCTURE: CPT

## 2022-03-03 PROCEDURE — 85730 THROMBOPLASTIN TIME PARTIAL: CPT

## 2022-03-03 PROCEDURE — 86901 BLOOD TYPING SEROLOGIC RH(D): CPT

## 2022-03-03 PROCEDURE — P9037: CPT

## 2022-03-03 PROCEDURE — 71275 CT ANGIOGRAPHY CHEST: CPT

## 2022-03-03 PROCEDURE — 88305 TISSUE EXAM BY PATHOLOGIST: CPT

## 2022-03-03 PROCEDURE — 86900 BLOOD TYPING SEROLOGIC ABO: CPT

## 2022-03-03 PROCEDURE — 82962 GLUCOSE BLOOD TEST: CPT

## 2022-03-03 PROCEDURE — 85027 COMPLETE CBC AUTOMATED: CPT

## 2022-03-03 PROCEDURE — 85610 PROTHROMBIN TIME: CPT

## 2022-03-03 PROCEDURE — P9100: CPT

## 2022-03-03 PROCEDURE — 94003 VENT MGMT INPAT SUBQ DAY: CPT

## 2022-03-03 RX ADMIN — HEPARIN SODIUM 5000 UNIT(S): 5000 INJECTION INTRAVENOUS; SUBCUTANEOUS at 13:43

## 2022-03-03 RX ADMIN — HEPARIN SODIUM 5000 UNIT(S): 5000 INJECTION INTRAVENOUS; SUBCUTANEOUS at 05:29

## 2022-03-03 RX ADMIN — Medication 1000 MILLIGRAM(S): at 05:29

## 2022-03-03 RX ADMIN — Medication 600 MILLIGRAM(S): at 11:23

## 2022-03-03 RX ADMIN — Medication 600 MILLIGRAM(S): at 10:23

## 2022-03-03 RX ADMIN — CHLORHEXIDINE GLUCONATE 1 APPLICATION(S): 213 SOLUTION TOPICAL at 05:29

## 2022-03-03 RX ADMIN — Medication 1000 MILLIGRAM(S): at 15:31

## 2022-03-03 NOTE — PROGRESS NOTE ADULT - SUBJECTIVE AND OBJECTIVE BOX
Patient is a 31y old  Female who presents with a chief complaint of Ovarian cystectomy (02 Mar 2022 09:09)    INTERVAL HPI/OVERNIGHT EVENTS: No acute overnight events. Pt seen and examined at the bedside this AM. Pt has some pain around her surgical wounds described as a soreness, which is improved with Tylenol and not requiring oxycodone. He also feels a "lump" under her R arm where her subcutaneous heparin was injected, and another lump around her L wrist, where no heparin was injected. Pt has no other complaints or concerns at this time- denies fevers, chills, chest pain, dysuria, LE swelling or pain.    MEDICATIONS  (STANDING):  chlorhexidine 2% Cloths 1 Application(s) Topical <User Schedule>  heparin   Injectable 5000 Unit(s) SubCutaneous every 8 hours  melatonin 5 milliGRAM(s) Oral at bedtime  polyethylene glycol 3350 17 Gram(s) Oral daily  senna 2 Tablet(s) Oral at bedtime    MEDICATIONS  (PRN):  acetaminophen     Tablet .. 1000 milliGRAM(s) Oral every 8 hours PRN Mild Pain (1 - 3)  ALPRAZolam 0.25 milliGRAM(s) Oral daily PRN aniexty/restlessness  guaiFENesin Oral Liquid (Sugar-Free) 100 milliGRAM(s) Oral every 6 hours PRN secretion  ibuprofen  Tablet. 600 milliGRAM(s) Oral every 6 hours PRN Moderate Pain (4 - 6)  oxyCODONE    IR 5 milliGRAM(s) Oral every 6 hours PRN Severe Pain (7 - 10)    Allergies  No Known Allergies    Intolerances    REVIEW OF SYSTEMS:  CONSTITUTIONAL: No fever or chills  HEENT:  No headache, no sore throat  RESPIRATORY: No cough, wheezing, or shortness of breath  CARDIOVASCULAR: No chest pain, palpitations  GASTROINTESTINAL: + Abd pain around surgical site; No nausea, vomiting, or diarrhea  GENITOURINARY: No dysuria, frequency, or hematuria  NEUROLOGICAL: no focal weakness or dizziness  MUSCULOSKELETAL: no myalgias; + Localized swelling Right arm and L forearm/wrist with no associated bruising     Vital Signs Last 24 Hrs  T(C): 36.9 (03 Mar 2022 05:28), Max: 36.9 (03 Mar 2022 05:28)  T(F): 98.5 (03 Mar 2022 05:28), Max: 98.5 (03 Mar 2022 05:28)  HR: 91 (03 Mar 2022 05:28) (87 - 91)  BP: 124/81 (03 Mar 2022 05:28) (116/73 - 130/85)  RR: 18 (03 Mar 2022 05:28) (18 - 18)  SpO2: 93% (03 Mar 2022 05:28) (93% - 99%)    PHYSICAL EXAM:  GENERAL: Lying in bed in NAD  HEENT:  anicteric, moist mucous membranes  CHEST/LUNG:  CTA b/l, no rales, wheezes, or rhonchi  HEART:  RRR, S1, S2  ABDOMEN:  BS+, soft, mild tenderness in lower quadrants, nondistended; dressings with abd binder overlying, C/D/I, no erythema or drainage surrounding incision  EXTREMITIES: no edema, cyanosis, or calf tenderness; + Some mild localized swelling in R arm and L wrist, with mild tenderness and no ecchymosis noted  NERVOUS SYSTEM: answers questions and follows commands appropriately    LABS:                        10.4   11.71 )-----------( 318      ( 03 Mar 2022 08:06 )             30.4     CBC Full  -  ( 03 Mar 2022 08:06 )  WBC Count : 11.71 K/uL  Hemoglobin : 10.4 g/dL  Hematocrit : 30.4 %  Platelet Count - Automated : 318 K/uL  Mean Cell Volume : 88.4 fl  Mean Cell Hemoglobin : 30.2 pg  Mean Cell Hemoglobin Concentration : 34.2 gm/dL    03 Mar 2022 08:06    138    |  107    |  7      ----------------------------<  92     3.8     |  23     |  0.61     Ca    8.3        03 Mar 2022 08:06    TPro  6.3    /  Alb  2.7    /  TBili  0.6    /  DBili  x      /  AST  24     /  ALT  23     /  AlkPhos  57     03 Mar 2022 08:06    CAPILLARY BLOOD GLUCOSE    Culture - Blood (collected 02-25-22 @ 18:34)  Source: .Blood Blood  Final Report (03-02-22 @ 19:00):    No Growth Final    Culture - Blood (collected 02-25-22 @ 18:34)  Source: .Blood Blood  Final Report (03-02-22 @ 19:00):    No Growth Final    RADIOLOGY & ADDITIONAL TESTS:  Personally reviewed.     Consultant(s) Notes Reviewed:  [x] YES  [ ] NO Patient is a 31y old  Female who presents with a chief complaint of Ovarian cystectomy (02 Mar 2022 09:09)    INTERVAL HPI/OVERNIGHT EVENTS: No acute overnight events. Pt seen and examined at the bedside this AM. Pt has some pain around her surgical wounds described as a soreness, which is improved with Tylenol and not requiring oxycodone. He also feels a "lump" under her R arm where her subcutaneous heparin was injected, and another lump around her L wrist, where no heparin was injected. Pt has no other complaints or concerns at this time- denies fevers, chills, chest pain, dysuria, LE swelling or pain. She also had to urinate 4 times last night but denies any dysuria, cloudy or foul-smelling urine, or any blood in her urine. She has been drinking water throughout the day over the past few days.    MEDICATIONS  (STANDING):  chlorhexidine 2% Cloths 1 Application(s) Topical <User Schedule>  heparin   Injectable 5000 Unit(s) SubCutaneous every 8 hours  melatonin 5 milliGRAM(s) Oral at bedtime  polyethylene glycol 3350 17 Gram(s) Oral daily  senna 2 Tablet(s) Oral at bedtime    MEDICATIONS  (PRN):  acetaminophen     Tablet .. 1000 milliGRAM(s) Oral every 8 hours PRN Mild Pain (1 - 3)  ALPRAZolam 0.25 milliGRAM(s) Oral daily PRN aniexty/restlessness  guaiFENesin Oral Liquid (Sugar-Free) 100 milliGRAM(s) Oral every 6 hours PRN secretion  ibuprofen  Tablet. 600 milliGRAM(s) Oral every 6 hours PRN Moderate Pain (4 - 6)  oxyCODONE    IR 5 milliGRAM(s) Oral every 6 hours PRN Severe Pain (7 - 10)    Allergies  No Known Allergies    Intolerances    REVIEW OF SYSTEMS:  CONSTITUTIONAL: No fever or chills  HEENT:  No headache, no sore throat  RESPIRATORY: No cough, wheezing, or shortness of breath  CARDIOVASCULAR: No chest pain, palpitations  GASTROINTESTINAL: + Abd pain around surgical site; No nausea, vomiting, or diarrhea  GENITOURINARY: No dysuria, frequency, or hematuria  NEUROLOGICAL: no focal weakness or dizziness  MUSCULOSKELETAL: no myalgias; + Localized swelling Right arm and L forearm/wrist with no associated bruising     Vital Signs Last 24 Hrs  T(C): 36.9 (03 Mar 2022 05:28), Max: 36.9 (03 Mar 2022 05:28)  T(F): 98.5 (03 Mar 2022 05:28), Max: 98.5 (03 Mar 2022 05:28)  HR: 91 (03 Mar 2022 05:28) (87 - 91)  BP: 124/81 (03 Mar 2022 05:28) (116/73 - 130/85)  RR: 18 (03 Mar 2022 05:28) (18 - 18)  SpO2: 93% (03 Mar 2022 05:28) (93% - 99%)    PHYSICAL EXAM:  GENERAL: Lying in bed in NAD  HEENT:  anicteric, moist mucous membranes  CHEST/LUNG:  CTA b/l, no rales, wheezes, or rhonchi  HEART:  RRR, S1, S2  ABDOMEN:  BS+, soft, mild tenderness in lower quadrants, nondistended; dressings with abd binder overlying, C/D/I, no erythema or drainage surrounding incision  EXTREMITIES: no edema, cyanosis, or calf tenderness; + Some mild localized swelling in R arm and L wrist, with mild tenderness and no ecchymosis noted  NERVOUS SYSTEM: answers questions and follows commands appropriately    LABS:                        10.4   11.71 )-----------( 318      ( 03 Mar 2022 08:06 )             30.4     CBC Full  -  ( 03 Mar 2022 08:06 )  WBC Count : 11.71 K/uL  Hemoglobin : 10.4 g/dL  Hematocrit : 30.4 %  Platelet Count - Automated : 318 K/uL  Mean Cell Volume : 88.4 fl  Mean Cell Hemoglobin : 30.2 pg  Mean Cell Hemoglobin Concentration : 34.2 gm/dL    03 Mar 2022 08:06    138    |  107    |  7      ----------------------------<  92     3.8     |  23     |  0.61     Ca    8.3        03 Mar 2022 08:06    TPro  6.3    /  Alb  2.7    /  TBili  0.6    /  DBili  x      /  AST  24     /  ALT  23     /  AlkPhos  57     03 Mar 2022 08:06    CAPILLARY BLOOD GLUCOSE    Culture - Blood (collected 02-25-22 @ 18:34)  Source: .Blood Blood  Final Report (03-02-22 @ 19:00):    No Growth Final    Culture - Blood (collected 02-25-22 @ 18:34)  Source: .Blood Blood  Final Report (03-02-22 @ 19:00):    No Growth Final    RADIOLOGY & ADDITIONAL TESTS:  Personally reviewed.     Consultant(s) Notes Reviewed:  [x] YES  [ ] NO

## 2022-03-03 NOTE — PROGRESS NOTE ADULT - PROBLEM SELECTOR PROBLEM 1
Retroperitoneal hematoma
S/P ovarian cystectomy
Status post ovarian cystectomy
Retroperitoneal hematoma

## 2022-03-03 NOTE — DISCHARGE NOTE NURSING/CASE MANAGEMENT/SOCIAL WORK - NSDCPEFALRISK_GEN_ALL_CORE
For information on Fall & Injury Prevention, visit: [email or web address] OR  [email or web address] OR  https://www.cdc.gov/steadi/patient.html

## 2022-03-03 NOTE — PROGRESS NOTE ADULT - PROBLEM SELECTOR PLAN 4
DVT ppx: C/w heparin subq 5000 mg q8h.    Dispo: Likely discharge to home later this afternoon, dispo planning as per primary team DVT ppx: C/w heparin subq 5000 mg q8h.    Dispo: Likely discharge to home later this afternoon with close outpatient follow-up and repeat labs within 1 week, dispo planning as per primary team. No medical contraindications to discharge at this time.

## 2022-03-03 NOTE — PROGRESS NOTE ADULT - ASSESSMENT
Patient is a 32 yo female with a pmh of anxiety, missed  and ovarian cyst POD#5 s/p laparoscopic ovarian cystectomy complicated by aortic injury with L retroperitoneal hematoma s/p MTP with total 5unit pRBC/1plt/1FFP. Medicine consulted for medical management of acute blood loss anemia 2/2 L retroperitoneal hematoma.

## 2022-03-03 NOTE — PROGRESS NOTE ADULT - ATTENDING COMMENTS
The patient was seen and evaluated independently by the attending physician.  - I have personally reviewed the pt's labs, imaging, micro data and consultant recommendations.    Thank you for allowing us to participate in the care of this patient. Our team will sign off. No medical contraindications to discharge planning.   Provided extensive counseling regarding the importance of close outpatient follow up. All questions/concerns addressed to the best of my ability. Rec labs within one week which can be coordinated w/ PCP +/- GYN.

## 2022-03-03 NOTE — PROGRESS NOTE ADULT - PROBLEM SELECTOR PLAN 2
POD #5 laparoscopic ovarian cystectomy   - Pain regimen as ordered   - Incentive spirometry  - PRN Robitussin for cough and increased sputum associated with ET tube  - Bowel regimen - miralax and senna daily.
POD #5 laparoscopic ovarian cystectomy   - Pain regimen as ordered   - Incentive spirometry  - PRN Robitussin for cough and increased sputum associated with ET tube  - Bowel regimen - miralax and senna daily

## 2022-03-03 NOTE — PROGRESS NOTE ADULT - SUBJECTIVE AND OBJECTIVE BOX
Postoperative Day #: 8    31y Female admitted with Abdominal pain    Cyst of ovary    S/P Laparoscopic converted to open ovarian cystectomy with chromotubation of fallopian tubes and repair of aortic injury.      Patient seen and examined bedside resting comfortably.  Tolerating diet.  Ambulating.  Passing flatus, having BM.  Voiding.  States has some lower back discomfort from the bed.  No N/V, fevers, chills.     T(F): 98.5 (03-03-22 @ 05:28), Max: 98.5 (03-03-22 @ 05:28)  HR: 91 (03-03-22 @ 05:28) (87 - 91)  BP: 124/81 (03-03-22 @ 05:28) (116/73 - 130/85)  RR: 18 (03-03-22 @ 05:28) (18 - 18)  SpO2: 93% (03-03-22 @ 05:28) (93% - 99%)  Wt(kg): --  CAPILLARY BLOOD GLUCOSE          PHYSICAL EXAM:  General: NAD  Neuro:  Alert & oriented x 3  CV: +S1+S2 regular rate and rhythm  Lung: clear to ausculation bilaterally  Abdomen: BS+ Soft. ND. Mild tenderness.  Incision clean dry intact with steri strips in place.  Abdominal binder on.  Extremities: no pedal edema or calf tenderness noted       LABS:                        10.4   11.71 )-----------( 318      ( 03 Mar 2022 08:06 )             30.4     03-03    138  |  107  |  7   ----------------------------<  92  3.8   |  23  |  0.61    Ca    8.3<L>      03 Mar 2022 08:06  Phos  3.5     03-02  Mg     2.1     03-02    TPro  6.3  /  Alb  2.7<L>  /  TBili  0.6  /  DBili  x   /  AST  24  /  ALT  23  /  AlkPhos  57  03-03      I&O's Detail        RADIOLOGY:     Postoperative Day #: 6    31y Female admitted with Abdominal pain    Cyst of ovary    S/P Laparoscopic converted to open ovarian cystectomy with chromotubation of fallopian tubes and repair of aortic injury.      Patient seen and examined bedside resting comfortably.  Tolerating diet.  Ambulating.  Passing flatus, having BM.  Voiding.  States has some lower back discomfort from the bed.  No N/V, fevers, chills.     T(F): 98.5 (03-03-22 @ 05:28), Max: 98.5 (03-03-22 @ 05:28)  HR: 91 (03-03-22 @ 05:28) (87 - 91)  BP: 124/81 (03-03-22 @ 05:28) (116/73 - 130/85)  RR: 18 (03-03-22 @ 05:28) (18 - 18)  SpO2: 93% (03-03-22 @ 05:28) (93% - 99%)  Wt(kg): --  CAPILLARY BLOOD GLUCOSE          PHYSICAL EXAM:  General: NAD  Neuro:  Alert & oriented x 3  CV: +S1+S2 regular rate and rhythm  Lung: clear to ausculation bilaterally  Abdomen: BS+ Soft. ND. Mild tenderness.  Incision clean dry intact with steri strips in place.  Abdominal binder on.  Extremities: no pedal edema or calf tenderness noted       LABS:                        10.4   11.71 )-----------( 318      ( 03 Mar 2022 08:06 )             30.4     03-03    138  |  107  |  7   ----------------------------<  92  3.8   |  23  |  0.61    Ca    8.3<L>      03 Mar 2022 08:06  Phos  3.5     03-02  Mg     2.1     03-02    TPro  6.3  /  Alb  2.7<L>  /  TBili  0.6  /  DBili  x   /  AST  24  /  ALT  23  /  AlkPhos  57  03-03      I&O's Detail        RADIOLOGY:

## 2022-03-03 NOTE — PROGRESS NOTE ADULT - ATTENDING COMMENTS
Patient seen at bedside. Patient reports increased soreness   Denies severe abdominal pain   tolerating po intake   h& h stable   increase in leukocytosis   afebrile   discharge home with f/u in office on 3/8

## 2022-03-03 NOTE — PROGRESS NOTE ADULT - PROBLEM SELECTOR PLAN 3
Chronic, exacerbated by hospital course  - Reports poor sleep only slightly improved by melatonin  - Continue melatonin and PRN Xanax as per primary team
Chronic, exacerbated by hospital course  - Reports poor sleep only slightly improved by melatonin  - Continue melatonin and PRN Xanax as per primary team

## 2022-03-03 NOTE — PROGRESS NOTE ADULT - PROBLEM SELECTOR PLAN 1
Ambulation, incentive spirometer  Regular diet  Analagesia prn  Discharge planning for this afternoon  Discussed with Dr. Valiente who will see patient prior to discharge.

## 2022-03-03 NOTE — PROGRESS NOTE ADULT - PROVIDER SPECIALTY LIST ADULT
Anesthesia
Critical Care
Critical Care
GYN
Vascular Surgery
Critical Care
GYN
Hospitalist
Hospitalist

## 2022-03-03 NOTE — PROGRESS NOTE ADULT - ASSESSMENT
32 yo female S/P Laparoscopic converted to open ovarian cystectomy with chromotubation of fallopian tubes and repair of aortic injury, POD 8. Pt is voiding, passing flatus, having BM.  Pt h.h is stable.  White count bumped up from 9.28->11.7.  30 yo female S/P Laparoscopic converted to open ovarian cystectomy with chromotubation of fallopian tubes and repair of aortic injury, POD6. Pt is voiding, passing flatus, having BM.  Pt h.h is stable.  White count bumped up from 9.28->11.7.

## 2022-03-03 NOTE — PROGRESS NOTE ADULT - PROBLEM SELECTOR PLAN 1
- S/p MTP with total 5unit pRBC/1plt/1FFP   - H/H stable, pt hemodynamically stable   - Continue to trend H/H with daily CBC, transfuse hgb <7  - Monitor for s/s of active bleeding   - Pt completing course of Zosyn this morning, monitor off abx  - Blood cultures - NGTD  - Vascular surgery recommendations appreciated.

## 2022-03-03 NOTE — DISCHARGE NOTE NURSING/CASE MANAGEMENT/SOCIAL WORK - PATIENT PORTAL LINK FT
You can access the FollowMyHealth Patient Portal offered by [facility] by registering at the following website: [email or web address]. By joining [facility]’s FollowMyHealth portal, you will also be able to view your health information using other applications (apps) compatible with our system.

## 2022-04-19 PROBLEM — O02.1 MISSED ABORTION: Chronic | Status: ACTIVE | Noted: 2022-02-18

## 2022-04-19 PROBLEM — F41.9 ANXIETY DISORDER, UNSPECIFIED: Chronic | Status: ACTIVE | Noted: 2022-02-18

## 2022-04-19 PROBLEM — O00.90 UNSPECIFIED ECTOPIC PREGNANCY WITHOUT INTRAUTERINE PREGNANCY: Chronic | Status: ACTIVE | Noted: 2022-02-18

## 2022-06-29 ENCOUNTER — RESULT REVIEW (OUTPATIENT)
Age: 32
End: 2022-06-29

## 2022-07-14 ENCOUNTER — APPOINTMENT (OUTPATIENT)
Dept: HUMAN REPRODUCTION | Facility: CLINIC | Age: 32
End: 2022-07-14

## 2022-07-14 ENCOUNTER — TRANSCRIPTION ENCOUNTER (OUTPATIENT)
Age: 32
End: 2022-07-14

## 2022-07-14 PROCEDURE — 36415 COLL VENOUS BLD VENIPUNCTURE: CPT

## 2022-07-14 PROCEDURE — 99204 OFFICE O/P NEW MOD 45 MIN: CPT | Mod: 25

## 2022-07-14 PROCEDURE — 76830 TRANSVAGINAL US NON-OB: CPT

## 2022-08-17 ENCOUNTER — APPOINTMENT (OUTPATIENT)
Dept: HUMAN REPRODUCTION | Facility: CLINIC | Age: 32
End: 2022-08-17

## 2022-08-17 PROCEDURE — 99214 OFFICE O/P EST MOD 30 MIN: CPT | Mod: 95

## 2022-12-28 ENCOUNTER — APPOINTMENT (OUTPATIENT)
Dept: HUMAN REPRODUCTION | Facility: CLINIC | Age: 32
End: 2022-12-28
Payer: COMMERCIAL

## 2022-12-28 PROCEDURE — 99213 OFFICE O/P EST LOW 20 MIN: CPT | Mod: 25

## 2022-12-28 PROCEDURE — 36415 COLL VENOUS BLD VENIPUNCTURE: CPT

## 2022-12-28 PROCEDURE — 76817 TRANSVAGINAL US OBSTETRIC: CPT

## 2022-12-29 ENCOUNTER — APPOINTMENT (OUTPATIENT)
Dept: HUMAN REPRODUCTION | Facility: CLINIC | Age: 32
End: 2022-12-29

## 2023-01-05 ENCOUNTER — APPOINTMENT (OUTPATIENT)
Dept: HUMAN REPRODUCTION | Facility: CLINIC | Age: 33
End: 2023-01-05
Payer: COMMERCIAL

## 2023-01-05 PROCEDURE — 36415 COLL VENOUS BLD VENIPUNCTURE: CPT

## 2023-01-11 ENCOUNTER — APPOINTMENT (OUTPATIENT)
Dept: HUMAN REPRODUCTION | Facility: CLINIC | Age: 33
End: 2023-01-11
Payer: COMMERCIAL

## 2023-01-11 PROCEDURE — 36415 COLL VENOUS BLD VENIPUNCTURE: CPT

## 2023-03-01 ENCOUNTER — APPOINTMENT (OUTPATIENT)
Dept: HUMAN REPRODUCTION | Facility: CLINIC | Age: 33
End: 2023-03-01
Payer: COMMERCIAL

## 2023-03-01 PROBLEM — Z00.00 ENCOUNTER FOR PREVENTIVE HEALTH EXAMINATION: Status: ACTIVE | Noted: 2023-03-01

## 2023-03-01 PROCEDURE — 99214 OFFICE O/P EST MOD 30 MIN: CPT

## 2023-03-22 ENCOUNTER — APPOINTMENT (OUTPATIENT)
Dept: HUMAN REPRODUCTION | Facility: CLINIC | Age: 33
End: 2023-03-22
Payer: COMMERCIAL

## 2023-03-22 PROCEDURE — 58340 CATHETER FOR HYSTEROGRAPHY: CPT

## 2023-03-22 PROCEDURE — 76831 ECHO EXAM UTERUS: CPT

## 2023-04-10 ENCOUNTER — APPOINTMENT (OUTPATIENT)
Dept: HUMAN REPRODUCTION | Facility: CLINIC | Age: 33
End: 2023-04-10
Payer: COMMERCIAL

## 2023-04-10 PROCEDURE — 99213 OFFICE O/P EST LOW 20 MIN: CPT | Mod: 25

## 2023-04-10 PROCEDURE — 36415 COLL VENOUS BLD VENIPUNCTURE: CPT

## 2023-04-10 PROCEDURE — 76857 US EXAM PELVIC LIMITED: CPT

## 2023-04-24 ENCOUNTER — APPOINTMENT (OUTPATIENT)
Dept: HUMAN REPRODUCTION | Facility: CLINIC | Age: 33
End: 2023-04-24
Payer: COMMERCIAL

## 2023-04-24 PROCEDURE — 76830 TRANSVAGINAL US NON-OB: CPT

## 2023-04-24 PROCEDURE — 36415 COLL VENOUS BLD VENIPUNCTURE: CPT

## 2023-04-24 PROCEDURE — 99213 OFFICE O/P EST LOW 20 MIN: CPT | Mod: 25

## 2023-05-01 ENCOUNTER — APPOINTMENT (OUTPATIENT)
Dept: HUMAN REPRODUCTION | Facility: CLINIC | Age: 33
End: 2023-05-01
Payer: COMMERCIAL

## 2023-05-01 PROCEDURE — 99213 OFFICE O/P EST LOW 20 MIN: CPT | Mod: 25

## 2023-05-01 PROCEDURE — 36415 COLL VENOUS BLD VENIPUNCTURE: CPT

## 2023-05-01 PROCEDURE — 76857 US EXAM PELVIC LIMITED: CPT

## 2023-05-03 ENCOUNTER — APPOINTMENT (OUTPATIENT)
Dept: HUMAN REPRODUCTION | Facility: CLINIC | Age: 33
End: 2023-05-03
Payer: COMMERCIAL

## 2023-05-03 PROCEDURE — 99213 OFFICE O/P EST LOW 20 MIN: CPT | Mod: 25

## 2023-05-03 PROCEDURE — 76857 US EXAM PELVIC LIMITED: CPT

## 2023-05-03 PROCEDURE — 36415 COLL VENOUS BLD VENIPUNCTURE: CPT

## 2023-05-05 ENCOUNTER — APPOINTMENT (OUTPATIENT)
Dept: HUMAN REPRODUCTION | Facility: CLINIC | Age: 33
End: 2023-05-05
Payer: COMMERCIAL

## 2023-05-05 PROCEDURE — 76857 US EXAM PELVIC LIMITED: CPT

## 2023-05-05 PROCEDURE — 36415 COLL VENOUS BLD VENIPUNCTURE: CPT

## 2023-05-05 PROCEDURE — 99213 OFFICE O/P EST LOW 20 MIN: CPT | Mod: 25

## 2023-05-07 ENCOUNTER — APPOINTMENT (OUTPATIENT)
Dept: HUMAN REPRODUCTION | Facility: CLINIC | Age: 33
End: 2023-05-07
Payer: COMMERCIAL

## 2023-05-07 PROCEDURE — 76857 US EXAM PELVIC LIMITED: CPT

## 2023-05-07 PROCEDURE — 36415 COLL VENOUS BLD VENIPUNCTURE: CPT

## 2023-05-07 PROCEDURE — 99213 OFFICE O/P EST LOW 20 MIN: CPT | Mod: 25

## 2023-05-08 ENCOUNTER — APPOINTMENT (OUTPATIENT)
Dept: HUMAN REPRODUCTION | Facility: CLINIC | Age: 33
End: 2023-05-08
Payer: COMMERCIAL

## 2023-05-08 PROCEDURE — 99213 OFFICE O/P EST LOW 20 MIN: CPT | Mod: 25

## 2023-05-08 PROCEDURE — 36415 COLL VENOUS BLD VENIPUNCTURE: CPT

## 2023-05-08 PROCEDURE — 76857 US EXAM PELVIC LIMITED: CPT

## 2023-05-09 ENCOUNTER — APPOINTMENT (OUTPATIENT)
Dept: HUMAN REPRODUCTION | Facility: CLINIC | Age: 33
End: 2023-05-09
Payer: COMMERCIAL

## 2023-05-09 PROCEDURE — 99213 OFFICE O/P EST LOW 20 MIN: CPT | Mod: 25

## 2023-05-09 PROCEDURE — 76857 US EXAM PELVIC LIMITED: CPT

## 2023-05-09 PROCEDURE — 36415 COLL VENOUS BLD VENIPUNCTURE: CPT

## 2023-05-10 ENCOUNTER — APPOINTMENT (OUTPATIENT)
Dept: HUMAN REPRODUCTION | Facility: CLINIC | Age: 33
End: 2023-05-10
Payer: COMMERCIAL

## 2023-05-10 PROCEDURE — 36415 COLL VENOUS BLD VENIPUNCTURE: CPT

## 2023-05-11 ENCOUNTER — APPOINTMENT (OUTPATIENT)
Dept: HUMAN REPRODUCTION | Facility: CLINIC | Age: 33
End: 2023-05-11
Payer: COMMERCIAL

## 2023-05-11 PROCEDURE — 89250 CULTR OOCYTE/EMBRYO <4 DAYS: CPT

## 2023-05-11 PROCEDURE — 58970 RETRIEVAL OF OOCYTE: CPT

## 2023-05-11 PROCEDURE — 89254 OOCYTE IDENTIFICATION: CPT

## 2023-05-11 PROCEDURE — 89261 SPERM ISOLATION COMPLEX: CPT

## 2023-05-11 PROCEDURE — 76948 ECHO GUIDE OVA ASPIRATION: CPT

## 2023-05-11 PROCEDURE — 89268 INSEMINATION OF OOCYTES: CPT

## 2023-05-12 ENCOUNTER — APPOINTMENT (OUTPATIENT)
Dept: HUMAN REPRODUCTION | Facility: CLINIC | Age: 33
End: 2023-05-12
Payer: COMMERCIAL

## 2023-05-14 PROCEDURE — 89253 EMBRYO HATCHING: CPT

## 2023-05-16 PROCEDURE — 89272 EXTENDED CULTURE OF OOCYTES: CPT

## 2023-05-17 PROCEDURE — 89291 BIOPSY OOCYTE POLAR BODY: CPT

## 2023-05-17 PROCEDURE — 89290 BIOPSY OOCYTE POLAR BODY <=5: CPT

## 2023-05-17 PROCEDURE — 89342 STORAGE/YEAR EMBRYO(S): CPT

## 2023-05-17 PROCEDURE — 89258 CRYOPRESERVATION EMBRYO(S): CPT

## 2023-05-22 ENCOUNTER — APPOINTMENT (OUTPATIENT)
Dept: HUMAN REPRODUCTION | Facility: CLINIC | Age: 33
End: 2023-05-22
Payer: COMMERCIAL

## 2023-05-22 PROCEDURE — 99213 OFFICE O/P EST LOW 20 MIN: CPT | Mod: 25

## 2023-05-22 PROCEDURE — 36415 COLL VENOUS BLD VENIPUNCTURE: CPT

## 2023-05-22 PROCEDURE — 76857 US EXAM PELVIC LIMITED: CPT

## 2023-05-31 ENCOUNTER — APPOINTMENT (OUTPATIENT)
Dept: HUMAN REPRODUCTION | Facility: CLINIC | Age: 33
End: 2023-05-31
Payer: COMMERCIAL

## 2023-05-31 PROCEDURE — 99213 OFFICE O/P EST LOW 20 MIN: CPT | Mod: 25

## 2023-05-31 PROCEDURE — 36415 COLL VENOUS BLD VENIPUNCTURE: CPT

## 2023-05-31 PROCEDURE — 76857 US EXAM PELVIC LIMITED: CPT

## 2023-06-07 ENCOUNTER — APPOINTMENT (OUTPATIENT)
Dept: HUMAN REPRODUCTION | Facility: CLINIC | Age: 33
End: 2023-06-07
Payer: COMMERCIAL

## 2023-06-07 PROCEDURE — 76857 US EXAM PELVIC LIMITED: CPT

## 2023-06-07 PROCEDURE — 99213 OFFICE O/P EST LOW 20 MIN: CPT | Mod: 25

## 2023-06-07 PROCEDURE — 36415 COLL VENOUS BLD VENIPUNCTURE: CPT

## 2023-06-09 ENCOUNTER — APPOINTMENT (OUTPATIENT)
Dept: HUMAN REPRODUCTION | Facility: CLINIC | Age: 33
End: 2023-06-09
Payer: COMMERCIAL

## 2023-06-09 PROCEDURE — 36415 COLL VENOUS BLD VENIPUNCTURE: CPT

## 2023-06-09 PROCEDURE — 99213 OFFICE O/P EST LOW 20 MIN: CPT | Mod: 25

## 2023-06-09 PROCEDURE — 76857 US EXAM PELVIC LIMITED: CPT

## 2023-06-13 ENCOUNTER — APPOINTMENT (OUTPATIENT)
Dept: HUMAN REPRODUCTION | Facility: CLINIC | Age: 33
End: 2023-06-13
Payer: COMMERCIAL

## 2023-06-13 PROCEDURE — 76857 US EXAM PELVIC LIMITED: CPT

## 2023-06-13 PROCEDURE — 36415 COLL VENOUS BLD VENIPUNCTURE: CPT

## 2023-06-13 PROCEDURE — 99213 OFFICE O/P EST LOW 20 MIN: CPT | Mod: 25

## 2023-06-16 ENCOUNTER — APPOINTMENT (OUTPATIENT)
Dept: HUMAN REPRODUCTION | Facility: CLINIC | Age: 33
End: 2023-06-16
Payer: COMMERCIAL

## 2023-06-16 PROCEDURE — 76857 US EXAM PELVIC LIMITED: CPT

## 2023-06-16 PROCEDURE — 99213 OFFICE O/P EST LOW 20 MIN: CPT | Mod: 25

## 2023-06-16 PROCEDURE — 36415 COLL VENOUS BLD VENIPUNCTURE: CPT

## 2023-06-18 ENCOUNTER — APPOINTMENT (OUTPATIENT)
Dept: HUMAN REPRODUCTION | Facility: CLINIC | Age: 33
End: 2023-06-18
Payer: COMMERCIAL

## 2023-06-18 PROCEDURE — 36415 COLL VENOUS BLD VENIPUNCTURE: CPT

## 2023-06-18 PROCEDURE — 99213 OFFICE O/P EST LOW 20 MIN: CPT | Mod: 25

## 2023-06-18 PROCEDURE — 76857 US EXAM PELVIC LIMITED: CPT

## 2023-06-23 ENCOUNTER — APPOINTMENT (OUTPATIENT)
Dept: HUMAN REPRODUCTION | Facility: CLINIC | Age: 33
End: 2023-06-23
Payer: COMMERCIAL

## 2023-06-23 PROCEDURE — 89352 THAWING CRYOPRESRVED EMBRYO: CPT

## 2023-06-23 PROCEDURE — 89255 PREPARE EMBRYO FOR TRANSFER: CPT

## 2023-06-23 PROCEDURE — 58974 EMBRYO TRANSFER INTRAUTERINE: CPT

## 2023-06-23 PROCEDURE — 89398A: CUSTOM

## 2023-06-23 PROCEDURE — 76998 US GUIDE INTRAOP: CPT

## 2023-06-24 ENCOUNTER — APPOINTMENT (OUTPATIENT)
Dept: HUMAN REPRODUCTION | Facility: CLINIC | Age: 33
End: 2023-06-24
Payer: COMMERCIAL

## 2023-07-05 ENCOUNTER — APPOINTMENT (OUTPATIENT)
Dept: HUMAN REPRODUCTION | Facility: CLINIC | Age: 33
End: 2023-07-05
Payer: COMMERCIAL

## 2023-07-05 PROCEDURE — 36415 COLL VENOUS BLD VENIPUNCTURE: CPT

## 2023-07-07 ENCOUNTER — APPOINTMENT (OUTPATIENT)
Dept: HUMAN REPRODUCTION | Facility: CLINIC | Age: 33
End: 2023-07-07
Payer: COMMERCIAL

## 2023-07-07 PROCEDURE — 36415 COLL VENOUS BLD VENIPUNCTURE: CPT

## 2023-07-14 ENCOUNTER — APPOINTMENT (OUTPATIENT)
Dept: HUMAN REPRODUCTION | Facility: CLINIC | Age: 33
End: 2023-07-14
Payer: COMMERCIAL

## 2023-07-14 PROCEDURE — 76817 TRANSVAGINAL US OBSTETRIC: CPT

## 2023-07-14 PROCEDURE — 36415 COLL VENOUS BLD VENIPUNCTURE: CPT

## 2023-07-14 PROCEDURE — 99213 OFFICE O/P EST LOW 20 MIN: CPT | Mod: 25

## 2023-07-21 ENCOUNTER — APPOINTMENT (OUTPATIENT)
Dept: HUMAN REPRODUCTION | Facility: CLINIC | Age: 33
End: 2023-07-21
Payer: COMMERCIAL

## 2023-07-21 PROCEDURE — 99213 OFFICE O/P EST LOW 20 MIN: CPT | Mod: 25

## 2023-07-21 PROCEDURE — 36415 COLL VENOUS BLD VENIPUNCTURE: CPT

## 2023-07-21 PROCEDURE — 76817 TRANSVAGINAL US OBSTETRIC: CPT

## 2023-08-22 ENCOUNTER — APPOINTMENT (OUTPATIENT)
Dept: ANTEPARTUM | Facility: CLINIC | Age: 33
End: 2023-08-22
Payer: COMMERCIAL

## 2023-08-22 ENCOUNTER — APPOINTMENT (OUTPATIENT)
Dept: OBGYN | Facility: CLINIC | Age: 33
End: 2023-08-22
Payer: COMMERCIAL

## 2023-08-22 VITALS
SYSTOLIC BLOOD PRESSURE: 136 MMHG | BODY MASS INDEX: 35.49 KG/M2 | HEIGHT: 65 IN | WEIGHT: 213 LBS | DIASTOLIC BLOOD PRESSURE: 86 MMHG

## 2023-08-22 DIAGNOSIS — Z36.82 ENCOUNTER FOR ANTENATAL SCREENING FOR NUCHAL TRANSLUCENCY: ICD-10-CM

## 2023-08-22 PROCEDURE — 76801 OB US < 14 WKS SINGLE FETUS: CPT | Mod: 59

## 2023-08-22 PROCEDURE — 99213 OFFICE O/P EST LOW 20 MIN: CPT

## 2023-08-22 PROCEDURE — 76813 OB US NUCHAL MEAS 1 GEST: CPT

## 2023-08-22 NOTE — HISTORY OF PRESENT ILLNESS
[FreeTextEntry1] : Patient is a 32 year old P0 presenting at 11w2d seen in my office today for nuchal translucency testing. FELECIA 3/10/24. The limitations of testing were discussed with the patient and she was informed that this is a screening test. If she desires a diagnostic test like CVS or Amniocentesis, this may be performed. She reports this is an IVF pregnancy.

## 2023-08-22 NOTE — DISCUSSION/SUMMARY
[FreeTextEntry1] : The significance of nuchal translucency testing was explained to the patient and ultrasound was performed. The sensitivity of the test can be improved by combining with second trimester quad screening. This type of sequential testing minimally increases the false positive rate, but the detection rate for Down syndrome is increased. If the sequential testing is desired, a second stage quad should be drawn and sent. As an alternative, this can be done in our office. If the patient does not desire sequential testing, then a single marker for AFP may be sent to any lab after 15 completed weeks gestation.  Prenatal diagnostic testing is clinically indicated for this patient. The limitations of NIPT testing were discussed with the patient and amniocentesis was noted to remain the gold standard for prenatal diagnostic testing. The significance of NIPT testing was reviewed and offered to the patient which she thinks she has got done with . NT Blood was drawn and the results will be sent to your office in approximately 2 weeks. Sequential screening is recommended between 15-16 weeks. Anatomy scan is recommended at 19-20 weeks.

## 2023-08-27 LAB
ADDITIONAL US: NORMAL
CRL SCAN TWIN B: NORMAL
CRL SCAN: NORMAL
CROWN RUMP LENGTH TWIN B: NORMAL
CROWN RUMP LENGTH: 45.4 MM
DIA MOM: 2.32
DIA VALUE: 532.7 PG/ML
DOWN SYNDROME AGE RISK: NORMAL
DOWN SYNDROME INTERPRETATION: NORMAL
DOWN SYNDROME SCREENING RISK: NORMAL
FIRST TRIMESTER SCREEN COMMENTS: NORMAL
FIRST TRIMESTER SCREEN NOTE: NORMAL
FIRST TRIMESTER SCREEN RESULTS: NORMAL
FIRST TRIMESTER SCREEN TEST RESULTS: NORMAL
GEST. AGE ON COLLECTION DATE: 11.1 WEEKS
HCG MOM: 1.37
HCG VALUE: 136 IU/ML
MATERNAL AGE AT EDD: 34.3 YR
NT MOM TWIN B: NORMAL
NT TWIN B: NORMAL
NUCHAL TRANSLUCENCY (NT): 1.3 MM
NUCHAL TRANSLUCENCY MOM: 1.05
NUMBER OF FETUSES: 1
PAPP-A MOM: 1.09
PAPP-A VALUE: 408.9 NG/ML
RACE: NORMAL
SONOGRAPHER ID#: NORMAL
TRISOMY 18 AGE RISK: NORMAL
TRISOMY 18 INTERPRETATION: NORMAL
TRISOMY 18 SCREENING RISK: NORMAL
WEIGHT AFP: 213 LBS

## 2023-10-05 DIAGNOSIS — Z31.83 ENCOUNTER FOR ASSISTED REPRODUCTIVE FERTILITY PROCEDURE CYCLE: ICD-10-CM

## 2023-10-19 ENCOUNTER — APPOINTMENT (OUTPATIENT)
Dept: PEDIATRIC CARDIOLOGY | Facility: CLINIC | Age: 33
End: 2023-10-19
Payer: COMMERCIAL

## 2023-10-19 PROCEDURE — 76820 UMBILICAL ARTERY ECHO: CPT

## 2023-10-19 PROCEDURE — 99202 OFFICE O/P NEW SF 15 MIN: CPT

## 2023-10-19 PROCEDURE — 76821 MIDDLE CEREBRAL ARTERY ECHO: CPT

## 2023-10-19 PROCEDURE — 76827 ECHO EXAM OF FETAL HEART: CPT

## 2023-10-19 PROCEDURE — 76825 ECHO EXAM OF FETAL HEART: CPT

## 2023-10-24 ENCOUNTER — ASOB RESULT (OUTPATIENT)
Age: 33
End: 2023-10-24

## 2023-10-24 ENCOUNTER — APPOINTMENT (OUTPATIENT)
Dept: ANTEPARTUM | Facility: CLINIC | Age: 33
End: 2023-10-24
Payer: COMMERCIAL

## 2023-10-24 ENCOUNTER — APPOINTMENT (OUTPATIENT)
Dept: OBGYN | Facility: CLINIC | Age: 33
End: 2023-10-24
Payer: COMMERCIAL

## 2023-10-24 VITALS
WEIGHT: 221 LBS | DIASTOLIC BLOOD PRESSURE: 79 MMHG | BODY MASS INDEX: 36.82 KG/M2 | HEIGHT: 65 IN | SYSTOLIC BLOOD PRESSURE: 133 MMHG

## 2023-10-24 PROCEDURE — ZZZZZ: CPT

## 2023-10-24 PROCEDURE — 76811 OB US DETAILED SNGL FETUS: CPT

## 2024-01-16 ENCOUNTER — ASOB RESULT (OUTPATIENT)
Age: 34
End: 2024-01-16

## 2024-01-16 ENCOUNTER — APPOINTMENT (OUTPATIENT)
Dept: OBGYN | Facility: CLINIC | Age: 34
End: 2024-01-16
Payer: COMMERCIAL

## 2024-01-16 ENCOUNTER — APPOINTMENT (OUTPATIENT)
Dept: ANTEPARTUM | Facility: CLINIC | Age: 34
End: 2024-01-16
Payer: COMMERCIAL

## 2024-01-16 VITALS — WEIGHT: 230 LBS | SYSTOLIC BLOOD PRESSURE: 135 MMHG | BODY MASS INDEX: 38.27 KG/M2 | DIASTOLIC BLOOD PRESSURE: 82 MMHG

## 2024-01-16 PROCEDURE — ZZZZZ: CPT

## 2024-01-16 PROCEDURE — 76819 FETAL BIOPHYS PROFIL W/O NST: CPT | Mod: 59

## 2024-01-16 PROCEDURE — 76816 OB US FOLLOW-UP PER FETUS: CPT

## 2024-02-13 ENCOUNTER — APPOINTMENT (OUTPATIENT)
Dept: OBGYN | Facility: CLINIC | Age: 34
End: 2024-02-13

## 2024-02-13 ENCOUNTER — APPOINTMENT (OUTPATIENT)
Dept: ANTEPARTUM | Facility: CLINIC | Age: 34
End: 2024-02-13

## 2024-02-20 ENCOUNTER — APPOINTMENT (OUTPATIENT)
Dept: OBGYN | Facility: CLINIC | Age: 34
End: 2024-02-20
Payer: COMMERCIAL

## 2024-02-20 ENCOUNTER — ASOB RESULT (OUTPATIENT)
Age: 34
End: 2024-02-20

## 2024-02-20 ENCOUNTER — APPOINTMENT (OUTPATIENT)
Dept: ANTEPARTUM | Facility: CLINIC | Age: 34
End: 2024-02-20
Payer: COMMERCIAL

## 2024-02-20 VITALS
WEIGHT: 234 LBS | SYSTOLIC BLOOD PRESSURE: 151 MMHG | BODY MASS INDEX: 38.99 KG/M2 | DIASTOLIC BLOOD PRESSURE: 89 MMHG | HEIGHT: 65 IN

## 2024-02-20 PROCEDURE — ZZZZZ: CPT

## 2024-02-20 PROCEDURE — 76819 FETAL BIOPHYS PROFIL W/O NST: CPT

## 2024-02-20 PROCEDURE — 76816 OB US FOLLOW-UP PER FETUS: CPT | Mod: 59

## 2024-02-20 NOTE — OB SUMMARY
[Date: _____] : Date: [unfilled]  [Gestational Age: _____] : Gestational Age: [unfilled] [FreeTextEntry1] : pt presents to office for anatomy scan. anatomy scan done today. No gross abnormalities noted. Normal growth. Normal fluid. Normal movement. +FHR (see official sono report) -pt had fetal echo done  -f/u PRN  [de-identified] : dn [FreeTextEntry9] : pt presents to office for EFW. EFW done today.  LGA - Wt 5lbs 6oz(94%). Normal fluid. Normal movement. +FHR (see official sono report)  -RTO 4 weeks for EFW  [de-identified] : marvin  [de-identified] : pt presents to office for EFW. EFW done today. Normal growth - Wt XX lbs XX oz. Normal fluid. Normal movement. +FHR (see official sono report). BP elevated today in office (151/89) and repeat (136/83). At present the patient denies headache, visual changes, and RUQ pain. She has been monitoring her BP at home and all values are within normal limits per patient.  -f/u PRN  [de-identified] : marvin

## 2024-03-10 ENCOUNTER — INPATIENT (INPATIENT)
Facility: HOSPITAL | Age: 34
LOS: 3 days | Discharge: HOME | End: 2024-03-14
Attending: SPECIALIST | Admitting: SPECIALIST

## 2024-03-10 VITALS
RESPIRATION RATE: 17 BRPM | DIASTOLIC BLOOD PRESSURE: 73 MMHG | HEART RATE: 97 BPM | OXYGEN SATURATION: 100 % | SYSTOLIC BLOOD PRESSURE: 111 MMHG | TEMPERATURE: 98 F

## 2024-03-10 DIAGNOSIS — Z98.1 ARTHRODESIS STATUS: Chronic | ICD-10-CM

## 2024-03-10 LAB
BASOPHILS # BLD AUTO: 0.02 K/UL — SIGNIFICANT CHANGE UP (ref 0–0.2)
BASOPHILS NFR BLD AUTO: 0.2 % — SIGNIFICANT CHANGE UP (ref 0–2)
BLD GP AB SCN SERPL QL: NEGATIVE — SIGNIFICANT CHANGE UP
EOSINOPHIL # BLD AUTO: 0.04 K/UL — SIGNIFICANT CHANGE UP (ref 0–0.5)
EOSINOPHIL NFR BLD AUTO: 0.4 % — SIGNIFICANT CHANGE UP (ref 0–6)
HCT VFR BLD CALC: 36.3 % — SIGNIFICANT CHANGE UP (ref 34.5–45)
HGB BLD-MCNC: 12.3 G/DL — SIGNIFICANT CHANGE UP (ref 11.5–15.5)
IANC: 7.22 K/UL — SIGNIFICANT CHANGE UP (ref 1.8–7.4)
IMM GRANULOCYTES NFR BLD AUTO: 0.8 % — SIGNIFICANT CHANGE UP (ref 0–0.9)
LYMPHOCYTES # BLD AUTO: 1.73 K/UL — SIGNIFICANT CHANGE UP (ref 1–3.3)
LYMPHOCYTES # BLD AUTO: 17.9 % — SIGNIFICANT CHANGE UP (ref 13–44)
MCHC RBC-ENTMCNC: 29.3 PG — SIGNIFICANT CHANGE UP (ref 27–34)
MCHC RBC-ENTMCNC: 33.9 GM/DL — SIGNIFICANT CHANGE UP (ref 32–36)
MCV RBC AUTO: 86.4 FL — SIGNIFICANT CHANGE UP (ref 80–100)
MONOCYTES # BLD AUTO: 0.56 K/UL — SIGNIFICANT CHANGE UP (ref 0–0.9)
MONOCYTES NFR BLD AUTO: 5.8 % — SIGNIFICANT CHANGE UP (ref 2–14)
NEUTROPHILS # BLD AUTO: 7.22 K/UL — SIGNIFICANT CHANGE UP (ref 1.8–7.4)
NEUTROPHILS NFR BLD AUTO: 74.9 % — SIGNIFICANT CHANGE UP (ref 43–77)
NRBC # BLD: 0 /100 WBCS — SIGNIFICANT CHANGE UP (ref 0–0)
NRBC # FLD: 0 K/UL — SIGNIFICANT CHANGE UP (ref 0–0)
PLATELET # BLD AUTO: 189 K/UL — SIGNIFICANT CHANGE UP (ref 150–400)
RBC # BLD: 4.2 M/UL — SIGNIFICANT CHANGE UP (ref 3.8–5.2)
RBC # FLD: 13.8 % — SIGNIFICANT CHANGE UP (ref 10.3–14.5)
RH IG SCN BLD-IMP: POSITIVE — SIGNIFICANT CHANGE UP
WBC # BLD: 9.65 K/UL — SIGNIFICANT CHANGE UP (ref 3.8–10.5)
WBC # FLD AUTO: 9.65 K/UL — SIGNIFICANT CHANGE UP (ref 3.8–10.5)

## 2024-03-10 RX ORDER — CHLORHEXIDINE GLUCONATE 213 G/1000ML
1 SOLUTION TOPICAL DAILY
Refills: 0 | Status: DISCONTINUED | OUTPATIENT
Start: 2024-03-10 | End: 2024-03-13

## 2024-03-10 RX ORDER — OXYTOCIN 10 UNIT/ML
333.33 VIAL (ML) INJECTION
Qty: 20 | Refills: 0 | Status: DISCONTINUED | OUTPATIENT
Start: 2024-03-10 | End: 2024-03-13

## 2024-03-10 RX ORDER — SODIUM CHLORIDE 9 MG/ML
1000 INJECTION, SOLUTION INTRAVENOUS
Refills: 0 | Status: DISCONTINUED | OUTPATIENT
Start: 2024-03-10 | End: 2024-03-13

## 2024-03-10 RX ORDER — CITRIC ACID/SODIUM CITRATE 300-500 MG
15 SOLUTION, ORAL ORAL EVERY 6 HOURS
Refills: 0 | Status: DISCONTINUED | OUTPATIENT
Start: 2024-03-10 | End: 2024-03-13

## 2024-03-10 RX ADMIN — SODIUM CHLORIDE 125 MILLILITER(S): 9 INJECTION, SOLUTION INTRAVENOUS at 08:45

## 2024-03-10 RX ADMIN — CHLORHEXIDINE GLUCONATE 1 APPLICATION(S): 213 SOLUTION TOPICAL at 13:22

## 2024-03-10 NOTE — OB RN PATIENT PROFILE - NSSDOHUTIL_OBGYN_A_OB
Patient Name: [facility]   YOB: 1961 [address] [place] [address]   MRN: [id number]    [place] [address]       Date of Service: 7/10/2020    Subjective       Chief Complaint   Patient presents with   • Follow-up     CD MRI, pictures        Follow up visit.   BS averaging 170s-210s mostly. No lows. Taking medications as directed. No exercising as much as in past. Diet not as reliably healthy. Would like labs, otherwise utd.     Continued neck pain with some numbness in right hand. Seeing ortho. Planning for injection at [facility] Monday.     Patient's medications, allergies, past medical, surgical, social and family histories were reviewed and updated as appropriate.    Review of Systems   Constitutional: Negative for chills, fever and unexpected weight change.   HENT: Negative for ear pain and sore throat.    Eyes: Negative for pain and visual disturbance.   Respiratory: Negative for cough and shortness of breath.    Cardiovascular: Negative for chest pain and palpitations.   Gastrointestinal: Negative for abdominal pain.        No change in bowel habits   Genitourinary: Negative for difficulty urinating.   Skin: Negative for rash.       Objective     Vitals:    07/10/20 1041   BP: 130/80   Pulse: 80   Temp: 97.7 °F (36.5 °C)       Physical Exam  Constitutional:       General: He is not in acute distress.     Appearance: He is well-developed.   HENT:      Head: Normocephalic.   Eyes:      General: No scleral icterus.     Conjunctiva/sclera: Conjunctivae normal.      Pupils: Pupils are equal, round, and reactive to light.   Neck:      Musculoskeletal: Normal range of motion.      Thyroid: No thyromegaly.   Cardiovascular:      Rate and Rhythm: Normal rate and regular rhythm.      Heart sounds: Normal heart sounds.   Pulmonary:      Effort: Pulmonary effort is normal.      Breath sounds: Normal breath sounds.   Skin:     General:  Skin is warm and dry.      Findings: No rash.         Assessment and Plan       Problem List Items Addressed This Visit        Endocrine    Type 2 diabetes mellitus with hyperglycemia (CMS/HCC) - Primary    Relevant Orders    BASIC METABOLIC PANEL    GLYCOHEMOGLOBIN       Hematologic & Lymphatic    Iron deficiency anemia    Relevant Orders    CBC WITH DIFFERENTIAL      Other Visit Diagnoses     Prostate cancer screening        Relevant Orders    PSA          Follow-up: 4-6 months    rFanki Sheikh DO  7/10/2020     no

## 2024-03-10 NOTE — OB PROVIDER LABOR PROGRESS NOTE - ASSESSMENT
CB attempted and unable to be placed as patient was unable to tolerate   VE: 0.5/0/-3  Will switch to PO cytotec  continue EFM/TOCO    Susana Liz PGY1  Done with ROSEMARIE Quevedo  Discussed with Dr. Saba   CB attempted and unable to be placed as patient was unable to tolerate   VE: 0.5/0/-3  Will switch to PO cytotec  continue EFM/TOCO    Susana Liz PGY1  Done with Pauline Lang NP  Discussed with Dr. Saba

## 2024-03-10 NOTE — OB PROVIDER H&P - HISTORY OF PRESENT ILLNESS
R1 H&P    Pt is a 32y/o  at 40w admitted for eIOL.   Prenatal course: IVF pregnancy  GBSneg  EFW 4000g extrapolated from 37wk sono    OBHx: 1 SAB, 2 ectopic pregnancies s/p MTX  GynHx: +hx endometriosis s/p R ovarian cystectomy, D&C hysteroscopy converted to ex-lap for repair of aorta; denies h/o abnormal paps, STI's, fibroids  PMHx: denies  PSHx: - lsc R ovarian cystectomy, D&C hysteroscopy converted to ex-lap for aortic repair  Med: Zoloft, PNV  All: NKDA  SH: denies alcohol, tobacco, or drug use  Psych: denies h/o anxiety or depression      EFH: 130/mod variability/+accels/-decels  [place]: absent  VE: 0/0/-3  TAUS: vertex

## 2024-03-10 NOTE — OB RN PATIENT PROFILE - NS_OBGYNHISTORY_OBGYN_ALL_OB_FT
Misx1  ectopic x2  d and c hysteroscopy and ovarian cyst removal turned to xlap  due to abdominal aorta being nicked

## 2024-03-10 NOTE — OB RN PATIENT PROFILE - AS SC BRADEN MOBILITY
Assessment/Plan:    No problem-specific Assessment & Plan notes found for this encounter. Diagnoses and all orders for this visit:    Yeast dermatitis  -     clotrimazole-betamethasone (LOTRISONE) 1-0.05 % cream; Apply topically 2 (two) times a day    Cellulitis of other specified site  -     clindamycin (CLEOCIN) 300 MG capsule; Take 1 capsule (300 mg total) by mouth 4 (four) times a day for 7 days          Subjective:      Patient ID: Hortencia Shetty is a 38 y.o. female. Pt presents for a problem today  She complains of a rash under her belly x 2 days--very itchy/burny/red  +oozing out of belly button, no blood  Slightly tender s/p LAVH 7-18-23  Bowel and bladdder are ok  No fever, chills  Believes she had a reaction to something after her bariatric surgery a few months ago as well      The following portions of the patient's history were reviewed and updated as appropriate: allergies, current medications, past family history, past medical history, past social history, past surgical history and problem list.    Review of Systems   Constitutional: Negative for chills, fever and unexpected weight change. Gastrointestinal: Negative for abdominal pain, blood in stool, constipation and diarrhea. Genitourinary: Negative. Skin: Positive for rash. Objective:      /82   Ht 5' 2" (1.575 m)   Wt 84.8 kg (187 lb)   LMP 06/30/2023 (Exact Date)   BMI 34.20 kg/m²          Physical Exam  Vitals and nursing note reviewed. Constitutional:       Appearance: She is well-developed. Abdominal:          Comments: +serosanguinous, slightly malodorous discharge coming from incision at umbilicus  Some erythema    +superficial yeast under abdomean   Genitourinary:     Exam position: Supine. Labia:         Right: No rash or lesion. Left: No rash or lesion. Vagina: Normal.      Cervix: No cervical motion tenderness, discharge or friability.    Lymphadenopathy:      Lower Body: No right inguinal adenopathy. No left inguinal adenopathy. (4) no limitation

## 2024-03-10 NOTE — OB PROVIDER H&P - NSRUBEOLADATE_OBGYN_ALL_OB
Care for catheter as per unit/ICU protocols Verbal/written post procedure instructions were given to patient/caregiver/Instructed patient/caregiver to follow-up with primary care physician/Instructed patient/caregiver regarding signs and symptoms of infection/Keep the cast/splint/dressing clean and dry/Care for catheter as per unit/ICU protocols 14-Dec-2023

## 2024-03-10 NOTE — OB PROVIDER H&P - ASSESSMENT
81.6
A&P:   Induction of Labor: admit to L&D  -for buccal cytotec, for cervical balloon  -routine labs  -EFM/toco  -CLD, IV hydration  Fetal: cat 1 tracing, fetal status reassuring  GBS: neg  Analgesia: for epi PRN      Discussed with Dr. Srinivasa Steiner PGY-1

## 2024-03-11 ENCOUNTER — TRANSCRIPTION ENCOUNTER (OUTPATIENT)
Age: 34
End: 2024-03-11

## 2024-03-11 LAB — T PALLIDUM AB TITR SER: NEGATIVE — SIGNIFICANT CHANGE UP

## 2024-03-11 RX ORDER — SERTRALINE 25 MG/1
50 TABLET, FILM COATED ORAL DAILY
Refills: 0 | Status: DISCONTINUED | OUTPATIENT
Start: 2024-03-11 | End: 2024-03-14

## 2024-03-11 RX ORDER — ONDANSETRON 8 MG/1
4 TABLET, FILM COATED ORAL ONCE
Refills: 0 | Status: COMPLETED | OUTPATIENT
Start: 2024-03-11 | End: 2024-03-11

## 2024-03-11 RX ORDER — OXYTOCIN 10 UNIT/ML
2 VIAL (ML) INJECTION
Qty: 30 | Refills: 0 | Status: DISCONTINUED | OUTPATIENT
Start: 2024-03-11 | End: 2024-03-13

## 2024-03-11 RX ADMIN — ONDANSETRON 4 MILLIGRAM(S): 8 TABLET, FILM COATED ORAL at 20:00

## 2024-03-11 RX ADMIN — SODIUM CHLORIDE 125 MILLILITER(S): 9 INJECTION, SOLUTION INTRAVENOUS at 21:14

## 2024-03-11 RX ADMIN — Medication 2 MILLIUNIT(S)/MIN: at 12:54

## 2024-03-11 RX ADMIN — CHLORHEXIDINE GLUCONATE 1 APPLICATION(S): 213 SOLUTION TOPICAL at 17:00

## 2024-03-11 RX ADMIN — SODIUM CHLORIDE 125 MILLILITER(S): 9 INJECTION, SOLUTION INTRAVENOUS at 01:52

## 2024-03-11 RX ADMIN — SERTRALINE 50 MILLIGRAM(S): 25 TABLET, FILM COATED ORAL at 10:37

## 2024-03-11 RX ADMIN — SODIUM CHLORIDE 125 MILLILITER(S): 9 INJECTION, SOLUTION INTRAVENOUS at 08:03

## 2024-03-11 NOTE — OB PROVIDER LABOR PROGRESS NOTE - ASSESSMENT
Cat 1 tracing  d/c PO cytotec. Start pitocin  Cont EFM/TOCO  Anticipate     d/w Dr. Chuy Garnica, PGY-1

## 2024-03-11 NOTE — OB PROVIDER LABOR PROGRESS NOTE - ASSESSMENT
34y/o  at 40+1wks eIOL without cervical change, maternal/fetal status reassuring.     -AROM'd no fluid  -continue EFM/TOCO/IV fluids  -continue pitocin- currently @16mu/min  -repositioning    Discussed w/ Dr Srinivasa PIERCE

## 2024-03-11 NOTE — CHART NOTE - NSCHARTNOTEFT_GEN_A_CORE
Ob attending    fht 120 moderate varaibility accels no decels  toco q5 min  a/p cat 1 fht  cb/bc  continue induction    Andres THAKKAR

## 2024-03-11 NOTE — OB PROVIDER LABOR PROGRESS NOTE - ASSESSMENT
@40.1wks Elective IOL  sp buccal cytotec, cervical balloon, PO cytotec  sp AROM @430p  VE unchanged, significant tone noted with exam  IUPC placed  Decision to switch back to cytotec (buccal vs PO)- will assess contraction pattern   Cont EFM/TOCO  Will reassess PRN    dw MD Salty Lang NP

## 2024-03-12 RX ORDER — KETOROLAC TROMETHAMINE 30 MG/ML
30 SYRINGE (ML) INJECTION EVERY 6 HOURS
Refills: 0 | Status: COMPLETED | OUTPATIENT
Start: 2024-03-12 | End: 2024-03-13

## 2024-03-12 RX ORDER — DIPHENHYDRAMINE HCL 50 MG
25 CAPSULE ORAL EVERY 6 HOURS
Refills: 0 | Status: DISCONTINUED | OUTPATIENT
Start: 2024-03-12 | End: 2024-03-14

## 2024-03-12 RX ORDER — SIMETHICONE 80 MG/1
80 TABLET, CHEWABLE ORAL EVERY 4 HOURS
Refills: 0 | Status: DISCONTINUED | OUTPATIENT
Start: 2024-03-12 | End: 2024-03-14

## 2024-03-12 RX ORDER — IBUPROFEN 200 MG
600 TABLET ORAL EVERY 6 HOURS
Refills: 0 | Status: COMPLETED | OUTPATIENT
Start: 2024-03-12 | End: 2025-02-08

## 2024-03-12 RX ORDER — TETANUS TOXOID, REDUCED DIPHTHERIA TOXOID AND ACELLULAR PERTUSSIS VACCINE, ADSORBED 5; 2.5; 8; 8; 2.5 [IU]/.5ML; [IU]/.5ML; UG/.5ML; UG/.5ML; UG/.5ML
0.5 SUSPENSION INTRAMUSCULAR ONCE
Refills: 0 | Status: DISCONTINUED | OUTPATIENT
Start: 2024-03-12 | End: 2024-03-14

## 2024-03-12 RX ORDER — OXYCODONE HYDROCHLORIDE 5 MG/1
5 TABLET ORAL
Refills: 0 | Status: DISCONTINUED | OUTPATIENT
Start: 2024-03-12 | End: 2024-03-14

## 2024-03-12 RX ORDER — FAMOTIDINE 10 MG/ML
20 INJECTION INTRAVENOUS ONCE
Refills: 0 | Status: COMPLETED | OUTPATIENT
Start: 2024-03-12 | End: 2024-03-12

## 2024-03-12 RX ORDER — ONDANSETRON 8 MG/1
4 TABLET, FILM COATED ORAL ONCE
Refills: 0 | Status: COMPLETED | OUTPATIENT
Start: 2024-03-12 | End: 2024-03-12

## 2024-03-12 RX ORDER — MAGNESIUM HYDROXIDE 400 MG/1
30 TABLET, CHEWABLE ORAL
Refills: 0 | Status: DISCONTINUED | OUTPATIENT
Start: 2024-03-12 | End: 2024-03-14

## 2024-03-12 RX ORDER — NALBUPHINE HYDROCHLORIDE 10 MG/ML
2.5 INJECTION, SOLUTION INTRAMUSCULAR; INTRAVENOUS; SUBCUTANEOUS EVERY 6 HOURS
Refills: 0 | Status: DISCONTINUED | OUTPATIENT
Start: 2024-03-12 | End: 2024-03-13

## 2024-03-12 RX ORDER — CITRIC ACID/SODIUM CITRATE 300-500 MG
30 SOLUTION, ORAL ORAL ONCE
Refills: 0 | Status: COMPLETED | OUTPATIENT
Start: 2024-03-12 | End: 2024-03-12

## 2024-03-12 RX ORDER — SODIUM CHLORIDE 0.65 %
1 AEROSOL, SPRAY (ML) NASAL
Refills: 0 | Status: DISCONTINUED | OUTPATIENT
Start: 2024-03-12 | End: 2024-03-14

## 2024-03-12 RX ORDER — LANOLIN
1 OINTMENT (GRAM) TOPICAL EVERY 6 HOURS
Refills: 0 | Status: DISCONTINUED | OUTPATIENT
Start: 2024-03-12 | End: 2024-03-14

## 2024-03-12 RX ORDER — ACETAMINOPHEN 500 MG
975 TABLET ORAL
Refills: 0 | Status: DISCONTINUED | OUTPATIENT
Start: 2024-03-12 | End: 2024-03-14

## 2024-03-12 RX ORDER — ONDANSETRON 8 MG/1
4 TABLET, FILM COATED ORAL EVERY 6 HOURS
Refills: 0 | Status: DISCONTINUED | OUTPATIENT
Start: 2024-03-12 | End: 2024-03-13

## 2024-03-12 RX ORDER — OXYTOCIN 10 UNIT/ML
2 VIAL (ML) INJECTION
Qty: 30 | Refills: 0 | Status: DISCONTINUED | OUTPATIENT
Start: 2024-03-12 | End: 2024-03-13

## 2024-03-12 RX ORDER — NALOXONE HYDROCHLORIDE 4 MG/.1ML
0.1 SPRAY NASAL
Refills: 0 | Status: DISCONTINUED | OUTPATIENT
Start: 2024-03-12 | End: 2024-03-13

## 2024-03-12 RX ORDER — ONDANSETRON 8 MG/1
4 TABLET, FILM COATED ORAL ONCE
Refills: 0 | Status: DISCONTINUED | OUTPATIENT
Start: 2024-03-12 | End: 2024-03-13

## 2024-03-12 RX ORDER — HYDROMORPHONE HYDROCHLORIDE 2 MG/ML
0.5 INJECTION INTRAMUSCULAR; INTRAVENOUS; SUBCUTANEOUS
Refills: 0 | Status: DISCONTINUED | OUTPATIENT
Start: 2024-03-12 | End: 2024-03-13

## 2024-03-12 RX ORDER — SODIUM CHLORIDE 9 MG/ML
1000 INJECTION, SOLUTION INTRAVENOUS
Refills: 0 | Status: DISCONTINUED | OUTPATIENT
Start: 2024-03-12 | End: 2024-03-14

## 2024-03-12 RX ORDER — AMPICILLIN TRIHYDRATE 250 MG
2 CAPSULE ORAL ONCE
Refills: 0 | Status: COMPLETED | OUTPATIENT
Start: 2024-03-12 | End: 2024-03-12

## 2024-03-12 RX ORDER — AMPICILLIN TRIHYDRATE 250 MG
1 CAPSULE ORAL EVERY 4 HOURS
Refills: 0 | Status: DISCONTINUED | OUTPATIENT
Start: 2024-03-12 | End: 2024-03-13

## 2024-03-12 RX ORDER — OXYCODONE HYDROCHLORIDE 5 MG/1
5 TABLET ORAL ONCE
Refills: 0 | Status: DISCONTINUED | OUTPATIENT
Start: 2024-03-12 | End: 2024-03-14

## 2024-03-12 RX ORDER — DEXAMETHASONE 0.5 MG/5ML
4 ELIXIR ORAL EVERY 6 HOURS
Refills: 0 | Status: DISCONTINUED | OUTPATIENT
Start: 2024-03-12 | End: 2024-03-13

## 2024-03-12 RX ORDER — OXYTOCIN 10 UNIT/ML
333.33 VIAL (ML) INJECTION
Qty: 20 | Refills: 0 | Status: DISCONTINUED | OUTPATIENT
Start: 2024-03-12 | End: 2024-03-13

## 2024-03-12 RX ADMIN — SERTRALINE 50 MILLIGRAM(S): 25 TABLET, FILM COATED ORAL at 10:33

## 2024-03-12 RX ADMIN — Medication 2 MILLIUNIT(S)/MIN: at 06:59

## 2024-03-12 RX ADMIN — Medication 108 GRAM(S): at 16:11

## 2024-03-12 RX ADMIN — ONDANSETRON 4 MILLIGRAM(S): 8 TABLET, FILM COATED ORAL at 14:14

## 2024-03-12 RX ADMIN — SODIUM CHLORIDE 125 MILLILITER(S): 9 INJECTION, SOLUTION INTRAVENOUS at 06:52

## 2024-03-12 RX ADMIN — SODIUM CHLORIDE 125 MILLILITER(S): 9 INJECTION, SOLUTION INTRAVENOUS at 16:12

## 2024-03-12 RX ADMIN — SODIUM CHLORIDE 125 MILLILITER(S): 9 INJECTION, SOLUTION INTRAVENOUS at 12:07

## 2024-03-12 RX ADMIN — Medication 30 MILLILITER(S): at 19:16

## 2024-03-12 RX ADMIN — FAMOTIDINE 20 MILLIGRAM(S): 10 INJECTION INTRAVENOUS at 19:16

## 2024-03-12 RX ADMIN — Medication 200 GRAM(S): at 12:07

## 2024-03-12 RX ADMIN — SODIUM CHLORIDE 125 MILLILITER(S): 9 INJECTION, SOLUTION INTRAVENOUS at 06:59

## 2024-03-12 NOTE — OB PROVIDER LABOR PROGRESS NOTE - ASSESSMENT
Plan   will start pitocin at next dose of augmentation   titrate to adequate contractions per IUPC  cont EFM, cat 1     Hannah Washington MD PGY4   d/w Dr. Bernabe

## 2024-03-12 NOTE — OB PROVIDER DELIVERY SUMMARY - NSPROVIDERDELIVERYNOTE_OBGYN_ALL_OB_FT
pLTCS for failed IOL   Epidural providing adequate anesthetic until the peritoneum was entered. Patient with persistent pain despite redosing. Decision made to go under general anesthesia for remainder of procedure  Minimal omental adhesions to anterior abdominal wall, no other significant adhesive disease in pelvis   Viable F infant, apgars 8/9, weight 3900g  Hysterotomy closed in 1 layer using caprosyn  Grossly normal uterus, tubes, and ovaries  Abdomen closed in standard fashion  Pt and infant to recovery in stable condition    QBL:1019   IVF:2400    UOP:160    Hannah Washington MD PGY4   w/ Dr. Bernabe          Attending Attestation:    No progress over 3 days  no change in cervix despite high doses of pitocin/cytotec  and rom  options d/w patient Plan:c/s

## 2024-03-12 NOTE — OB PROVIDER LABOR PROGRESS NOTE - NS_SUBJECTIVE/OBJECTIVE_OBGYN_ALL_OB_FT
Pt seen & examined at bedside for labor progress. Resting comfortably with epidural.     Vital Signs Last 24 Hrs  T(C): 37.0 (11 Mar 2024 14:04), Max: 37.1 (10 Mar 2024 21:49)  T(F): 98.6 (11 Mar 2024 14:04), Max: 98.78 (10 Mar 2024 21:49)  HR: 91 (11 Mar 2024 16:36) (81 - 122)  BP: 135/86 (11 Mar 2024 16:36) (99/54 - 149/75)  BP(mean): --  RR: 18 (11 Mar 2024 14:04) (16 - 20)  SpO2: 97% (11 Mar 2024 16:36) (91% - 100%)    Parameters below as of 10 Mar 2024 21:49  Patient On (Oxygen Delivery Method): room air        , moderate variability, +accels, -decels, cat 1   TOCO: Q2-3min  VE: 3/70/-3  AROM no fluid, palpable hair
pt examined at bedside, comfortable
R4 Labor Note     Patient examined. Cervix extremely posterior.
VE performed in the setting of CB falling out
Patient seen and examined for placement of IUPC secondary to high dose pitocin.   VS  T(C): 36.8 (03-11-24 @ 20:00)  HR: 84 (03-11-24 @ 20:37)  BP: 106/64 (03-11-24 @ 20:37)  RR: 19 (03-11-24 @ 20:00)  SpO2: 94% (03-11-24 @ 20:40)
patient examined for cervical balloon

## 2024-03-12 NOTE — OB PROVIDER LABOR PROGRESS NOTE - NS_OBIHIFHRDETAILS_OBGYN_ALL_OB_FT
130s, mod variability, +accel, -decel
130 mod variability, -accels, -decels
130, mod sam, +accels, -decels
125/mod/+accels/-decels
135 mod sam/+accels/-decels

## 2024-03-12 NOTE — OB NEONATOLOGY/PEDIATRICIAN DELIVERY SUMMARY - NSPEDSNEONOTESA_OBGYN_ALL_OB_FT
Peds called to LDR/OR for _____ **** wk AGA/SGA/LGA female / male born via ****  / CS to a _ y/o G_ mother.  Maternal medical/surgical/pregnancy history of _____ or No significant maternal or prenatal history. Maternal labs include Blood Type ___ , HIV - , RPR NR , Rubella I , Hep B - , GBS +/- _____ (received ampx***), COVID +/-. ROM at __ on __ with clear / meconium fluids (ROM hours: _H_M).  Baby emerged vigorous, crying, was warmed, dried, suctioned, and stimulated with APGARS of **/** . ***Nuchal x1. Resuscitation included: ___________ . Mom plans to initiate breastfeeding / formula feed, consents / declines Hep B vaccine and consents / declines circ.  Highest maternal temp: ___. EOS ___. Peds called to OR 4 for a 40.2 wk LGA female born via unscheduled CS after IOL due to macrosomia to a 33y/o  mother.  Maternal medical history of anxiety on zoloft. Prenatal history of macrosomia. Maternal labs include Blood Type O+, HIV - , RPR NR , Rubella I , Hep B - , GBS unknown (received amp x 2 greater than 2 hours before delivery). AROM at 16:34 on 3/11 with clear fluids (ROM hours: 4H).  Baby emerged vigorous, crying, was warmed, dried, suctioned, and stimulated with APGARS of 8/9. Mom plans to initiate breastfeeding, consents Hep B vaccine. Highest maternal temp: 36.8. EOS 0.03. Void x1.    Physical Exam:  Gen: no acute distress, +grimace  HEENT:  anterior fontanel open soft and flat, nondysmorphic facies, no cleft lip/palate, ears normal set, no ear pits or tags, nares clinically patent  Resp: Normal respiratory effort without grunting or retractions, good air entry b/l, clear to auscultation bilaterally  Cardio: Present S1/S2, regular rate and rhythm, no murmurs  Abd: soft, non tender, non distended, umbilical cord with 3 vessels  Neuro: +palmar and plantar grasp, +suck, +reza, normal tone  Extremities: negative olivares and ortolani maneuvers, moving all extremities, no clavicular crepitus or stepoff  Skin: pink, warm  Genitals: Normal female anatomy, Abel 1, anus patent

## 2024-03-12 NOTE — OB RN DELIVERY SUMMARY - NSSELHIDDEN_OBGYN_ALL_OB_FT
[NS_DeliveryAttending1_OBGYN_ALL_OB_FT:[id number]],[NS_DeliveryAssist1_OBGYN_ALL_OB_FT:[id number]],[NS_DeliveryRN_OBGYN_ALL_OB_FT:[id number]]

## 2024-03-12 NOTE — OB PROVIDER DELIVERY SUMMARY - NSLOWPPHRISK_OBGYN_A_OB
No previous uterine incision/[place] Pregnancy/Less than or equal to 4 previous vaginal births/No known bleeding disorder/No history of postpartum hemorrhage/No other PPH risks indicated

## 2024-03-12 NOTE — OB RN DELIVERY SUMMARY - NSBABYASEPSISRSK_OBGYN_N_OB_NU
41F w/ uterine fibroids and menorrhagia presents w/ symptomatic anemia.  She had her menses last week which has been prolonged and heavy (but w/out pain) in the setting of uterine fibroids.  She has been resistant to surgery out of fear.  She has required transfusions in the past, most recently in June, 2017 for similar reasons.  Today, she went to work but felt lightheaded and 'woozy' -- She went home from work and then went to [facility] where they noted a hgb of 6.4, they then recommended going to the ED for a transfusion.  She is adherent to her iron liquid supplements.  She denies any other acute symptoms or complaints.  She had no chest pain or SOB during this episode.  She denies blood in her urine or bowel movements. 41F w/ uterine fibroids and menorrhagia presents w/ symptomatic anemia.  She had her menses last week which has been prolonged and heavy (but w/out pain) in the setting of uterine fibroids.  She has been resistant to surgery out of fear.  She has required transfusions in the past, most recently in June, 2017 for similar reasons.  Admitted to [facility] for symptomatic anemia 2/2 to uterine fibroids. She required 2PRBCs for transfuse goal x>7. On discharge she was counseled to establish outpatient care with a obgyn. Patient was medically optimized, stable and ready for discharge. Plan of care and return precautions were discussed with the patient who verbally stated understanding. 0.11

## 2024-03-12 NOTE — OB RN DELIVERY SUMMARY - NS_LABORCHARACTER_OBGYN_ALL_OB
Induction of labor-AROM/Induction of labor-Medicinal/External electronic FM/Antibiotics in labor/Fetal intolerance

## 2024-03-12 NOTE — OB PROVIDER LABOR PROGRESS NOTE - NSVAGINALEXAM_OBGYN_ALL_OB_DT
11-Mar-2024 12:37
11-Mar-2024 20:10
12-Mar-2024 04:10
10-Mar-2024 20:37
11-Mar-2024 00:22
11-Mar-2024 16:30

## 2024-03-12 NOTE — OB RN INTRAOPERATIVE NOTE - NSSELHIDDEN_OBGYN_ALL_OB_FT
[NS_DeliveryAttending1_OBGYN_ALL_OB_FT:[id number]],[NS_DeliveryAssist1_OBGYN_ALL_OB_FT:[id number]],[NS_DeliveryRN_OBGYN_ALL_OB_FT:[id number]]

## 2024-03-12 NOTE — OB RN DELIVERY SUMMARY - NS_SEPSISRSKCALC_OBGYN_ALL_OB_FT
EOS calculated successfully. EOS Risk Factor: 0.5/1000 live births ([facility] national incidence); GA=40w2d; Temp=98.96; ROM=27.8; GBS='Negative'; Antibiotics='Broad spectrum antibiotics 2-3.9 hrs prior to birth'

## 2024-03-13 LAB
BASOPHILS # BLD AUTO: 0.03 K/UL — SIGNIFICANT CHANGE UP (ref 0–0.2)
BASOPHILS NFR BLD AUTO: 0.2 % — SIGNIFICANT CHANGE UP (ref 0–2)
EOSINOPHIL # BLD AUTO: 0 K/UL — SIGNIFICANT CHANGE UP (ref 0–0.5)
EOSINOPHIL NFR BLD AUTO: 0 % — SIGNIFICANT CHANGE UP (ref 0–6)
HCT VFR BLD CALC: 30.4 % — LOW (ref 34.5–45)
HGB BLD-MCNC: 9.8 G/DL — LOW (ref 11.5–15.5)
IANC: 12.94 K/UL — HIGH (ref 1.8–7.4)
IMM GRANULOCYTES NFR BLD AUTO: 0.7 % — SIGNIFICANT CHANGE UP (ref 0–0.9)
LYMPHOCYTES # BLD AUTO: 1.28 K/UL — SIGNIFICANT CHANGE UP (ref 1–3.3)
LYMPHOCYTES # BLD AUTO: 8.3 % — LOW (ref 13–44)
MCHC RBC-ENTMCNC: 29.5 PG — SIGNIFICANT CHANGE UP (ref 27–34)
MCHC RBC-ENTMCNC: 32.2 GM/DL — SIGNIFICANT CHANGE UP (ref 32–36)
MCV RBC AUTO: 91.6 FL — SIGNIFICANT CHANGE UP (ref 80–100)
MONOCYTES # BLD AUTO: 1.06 K/UL — HIGH (ref 0–0.9)
MONOCYTES NFR BLD AUTO: 6.9 % — SIGNIFICANT CHANGE UP (ref 2–14)
NEUTROPHILS # BLD AUTO: 12.94 K/UL — HIGH (ref 1.8–7.4)
NEUTROPHILS NFR BLD AUTO: 83.9 % — HIGH (ref 43–77)
NRBC # BLD: 0 /100 WBCS — SIGNIFICANT CHANGE UP (ref 0–0)
NRBC # FLD: 0 K/UL — SIGNIFICANT CHANGE UP (ref 0–0)
PLATELET # BLD AUTO: 165 K/UL — SIGNIFICANT CHANGE UP (ref 150–400)
RBC # BLD: 3.32 M/UL — LOW (ref 3.8–5.2)
RBC # FLD: 13.9 % — SIGNIFICANT CHANGE UP (ref 10.3–14.5)
WBC # BLD: 15.42 K/UL — HIGH (ref 3.8–10.5)
WBC # FLD AUTO: 15.42 K/UL — HIGH (ref 3.8–10.5)

## 2024-03-13 RX ORDER — HEPARIN SODIUM 5000 [USP'U]/ML
5000 INJECTION INTRAVENOUS; SUBCUTANEOUS EVERY 12 HOURS
Refills: 0 | Status: DISCONTINUED | OUTPATIENT
Start: 2024-03-13 | End: 2024-03-14

## 2024-03-13 RX ORDER — ASCORBIC ACID 60 MG
500 TABLET,CHEWABLE ORAL DAILY
Refills: 0 | Status: DISCONTINUED | OUTPATIENT
Start: 2024-03-13 | End: 2024-03-14

## 2024-03-13 RX ORDER — IBUPROFEN 200 MG
600 TABLET ORAL EVERY 6 HOURS
Refills: 0 | Status: DISCONTINUED | OUTPATIENT
Start: 2024-03-13 | End: 2024-03-14

## 2024-03-13 RX ORDER — SENNA PLUS 8.6 MG/1
1 TABLET ORAL
Refills: 0 | Status: DISCONTINUED | OUTPATIENT
Start: 2024-03-13 | End: 2024-03-14

## 2024-03-13 RX ORDER — FERROUS SULFATE 325(65) MG
325 TABLET ORAL THREE TIMES A DAY
Refills: 0 | Status: DISCONTINUED | OUTPATIENT
Start: 2024-03-13 | End: 2024-03-14

## 2024-03-13 RX ADMIN — Medication 325 MILLIGRAM(S): at 23:37

## 2024-03-13 RX ADMIN — Medication 975 MILLIGRAM(S): at 10:45

## 2024-03-13 RX ADMIN — Medication 30 MILLIGRAM(S): at 14:15

## 2024-03-13 RX ADMIN — HEPARIN SODIUM 5000 UNIT(S): 5000 INJECTION INTRAVENOUS; SUBCUTANEOUS at 05:55

## 2024-03-13 RX ADMIN — Medication 30 MILLIGRAM(S): at 06:25

## 2024-03-13 RX ADMIN — Medication 975 MILLIGRAM(S): at 10:04

## 2024-03-13 RX ADMIN — HEPARIN SODIUM 5000 UNIT(S): 5000 INJECTION INTRAVENOUS; SUBCUTANEOUS at 18:43

## 2024-03-13 RX ADMIN — Medication 500 MILLIGRAM(S): at 15:43

## 2024-03-13 RX ADMIN — Medication 30 MILLIGRAM(S): at 05:55

## 2024-03-13 RX ADMIN — Medication 975 MILLIGRAM(S): at 20:57

## 2024-03-13 RX ADMIN — Medication 975 MILLIGRAM(S): at 01:10

## 2024-03-13 RX ADMIN — SERTRALINE 50 MILLIGRAM(S): 25 TABLET, FILM COATED ORAL at 10:10

## 2024-03-13 RX ADMIN — Medication 325 MILLIGRAM(S): at 13:35

## 2024-03-13 RX ADMIN — Medication 30 MILLIGRAM(S): at 13:36

## 2024-03-13 RX ADMIN — Medication 975 MILLIGRAM(S): at 16:20

## 2024-03-13 RX ADMIN — Medication 975 MILLIGRAM(S): at 15:44

## 2024-03-13 RX ADMIN — Medication 975 MILLIGRAM(S): at 22:00

## 2024-03-13 RX ADMIN — Medication 30 MILLIGRAM(S): at 18:43

## 2024-03-13 NOTE — PROVIDER CONTACT NOTE (OTHER) - BACKGROUND
C/S 3/12 @ 20:22. CBL: 1,019 s/p TXA, IM methergine, & extra pit. Pt. currently tolerating sips of water & pretzels.

## 2024-03-13 NOTE — PROVIDER CONTACT NOTE (OTHER) - RECOMMENDATIONS
Consider keeping evans in place until resolution of dizziness & IV fluid bolus. Continue to encourage PO hydration & monitor for s/s of acute distress.

## 2024-03-13 NOTE — PROVIDER CONTACT NOTE (OTHER) - ACTION/TREATMENT ORDERED:
No need for IV fluid bolus at this time due to adequate urine output. Keep evans in place at this time. Encourage PO hydration. Continue to monitor for s/s of acute distress. No further interventions

## 2024-03-13 NOTE — PROVIDER CONTACT NOTE (OTHER) - ASSESSMENT
Pt. clinically stable outside of dizziness with minimal bleeding. Urine output 300cc over past 4-4.5 hours though it is dark.

## 2024-03-13 NOTE — PROVIDER CONTACT NOTE (OTHER) - SITUATION
Pt. c/o increased dizziness when completing orthostatic BPs. Was previously c/o dizziness since arriving to unit after transferring from stretcher to bed.

## 2024-03-14 ENCOUNTER — TRANSCRIPTION ENCOUNTER (OUTPATIENT)
Age: 34
End: 2024-03-14

## 2024-03-14 VITALS
OXYGEN SATURATION: 100 % | HEART RATE: 97 BPM | DIASTOLIC BLOOD PRESSURE: 68 MMHG | SYSTOLIC BLOOD PRESSURE: 123 MMHG | TEMPERATURE: 98 F | RESPIRATION RATE: 18 BRPM

## 2024-03-14 RX ORDER — ACETAMINOPHEN 500 MG
3 TABLET ORAL
Qty: 0 | Refills: 0 | DISCHARGE
Start: 2024-03-14

## 2024-03-14 RX ORDER — IBUPROFEN 200 MG
1 TABLET ORAL
Qty: 0 | Refills: 0 | DISCHARGE
Start: 2024-03-14

## 2024-03-14 RX ADMIN — Medication 600 MILLIGRAM(S): at 11:12

## 2024-03-14 RX ADMIN — SERTRALINE 50 MILLIGRAM(S): 25 TABLET, FILM COATED ORAL at 11:12

## 2024-03-14 RX ADMIN — HEPARIN SODIUM 5000 UNIT(S): 5000 INJECTION INTRAVENOUS; SUBCUTANEOUS at 06:00

## 2024-03-14 RX ADMIN — Medication 325 MILLIGRAM(S): at 11:12

## 2024-03-14 RX ADMIN — Medication 975 MILLIGRAM(S): at 09:31

## 2024-03-14 RX ADMIN — Medication 975 MILLIGRAM(S): at 03:38

## 2024-03-14 RX ADMIN — Medication 600 MILLIGRAM(S): at 06:05

## 2024-03-14 RX ADMIN — Medication 975 MILLIGRAM(S): at 10:05

## 2024-03-14 RX ADMIN — Medication 600 MILLIGRAM(S): at 12:00

## 2024-03-14 RX ADMIN — Medication 325 MILLIGRAM(S): at 05:35

## 2024-03-14 RX ADMIN — Medication 600 MILLIGRAM(S): at 05:35

## 2024-03-14 RX ADMIN — Medication 975 MILLIGRAM(S): at 04:08

## 2024-03-14 NOTE — DISCHARGE NOTE OB - CARE PLAN
Principal Discharge DX:	 delivery delivered  Assessment and plan of treatment:	pelvic rest x 6 weeks   1 Principal Discharge DX:	 delivery delivered  Assessment and plan of treatment:	After discharge, please stay on pelvic rest for 6 weeks, meaning no sexual intercourse, no tampons and no douching.  No driving for 2 weeks as women can loose a lot of blood during delivery and there is a possibility of being lightheaded/fainting.  No lifting objects heavier than baby for two weeks.  Expect to have vaginal bleeding/spotting for up to six weeks.  The bleeding should get lighter and more white/light brown with time.  For bleeding soaking more than a pad an hour or passing clots greater than the size of your fist, come in to the emergency department.    Follow up in OB office in 1-2 weeks for incision check.  Call for noticeable increase in redness or swelling at incision, discharge from incision, or opening of skin at incision site

## 2024-03-14 NOTE — DISCHARGE NOTE OB - MATERIALS PROVIDED
Vaccinations/[facility]  Screening Program/  Immunization Record/Breastfeeding Log/Breastfeeding Mother’s Support Group Information/Guide to Postpartum Care/[facility] Hearing Screen Program/Back To Sleep Handout/Shaken Baby Prevention Handout/Breastfeeding Guide and Packet/Birth Certificate Instructions/Discharge Medication Information for Patients and Families Pocket Guide

## 2024-03-14 NOTE — DISCHARGE NOTE OB - CARE PROVIDER_API CALL
Vishnu Bernabe  Obstetrics and Gynecology  [address] [place] [address]  Phone: [phone]  Fax: [phone]  Follow Up Time:

## 2024-03-14 NOTE — DISCHARGE NOTE OB - PATIENT PORTAL LINK FT
You can access the FollowMyHealth Patient Portal offered by [facility] by registering at the following website: [email or web address]. By joining [employer]’s FollowMyHealth portal, you will also be able to view your health information using other applications (apps) compatible with our system.

## 2024-03-14 NOTE — PROGRESS NOTE ADULT - SUBJECTIVE AND OBJECTIVE BOX
Post Op C/S 1      Pt without complaints    Vital Signs Last 24 Hrs  T(C): 36.7 (13 Mar 2024 10:00), Max: 37.2 (12 Mar 2024 14:01)  T(F): 98.1 (13 Mar 2024 10:00), Max: 98.96 (12 Mar 2024 14:01)  HR: 83 (13 Mar 2024 10:00) (83 - 110)  BP: 116/70 (13 Mar 2024 10:00) (102/57 - 149/67)  BP(mean): 90 (12 Mar 2024 23:45) (78 - 99)  RR: 18 (13 Mar 2024 10:00) (11 - 23)  SpO2: 100% (13 Mar 2024 10:00) (90% - 100%)    Parameters below as of 13 Mar 2024 10:00  Patient On (Oxygen Delivery Method): room air      MEDICATIONS  (STANDING):  acetaminophen     Tablet .. 975 milliGRAM(s) Oral <User Schedule>  ascorbic acid 500 milliGRAM(s) Oral daily  diphtheria/tetanus/pertussis (acellular) Vaccine (Adacel) 0.5 milliLiter(s) IntraMuscular once  ferrous    sulfate 325 milliGRAM(s) Oral three times a day  heparin   Injectable 5000 Unit(s) SubCutaneous every 12 hours  ibuprofen  Tablet. 600 milliGRAM(s) Oral every 6 hours  ketorolac   Injectable 30 milliGRAM(s) IV Push every 6 hours  lactated ringers. 1000 milliLiter(s) (125 mL/Hr) IV Continuous <Continuous>  oxytocin Infusion 333.333 milliUNIT(s)/Min (1000 mL/Hr) IV Continuous <Continuous>  oxytocin Infusion 333.333 milliUNIT(s)/Min (1000 mL/Hr) IV Continuous <Continuous>  oxytocin Infusion. 2 milliUNIT(s)/Min (2 mL/Hr) IV Continuous <Continuous>  oxytocin Infusion. 2 milliUNIT(s)/Min (2 mL/Hr) IV Continuous <Continuous>  sertraline 50 milliGRAM(s) Oral daily    MEDICATIONS  (PRN):  dexAMETHasone  Injectable 4 milliGRAM(s) IV Push every 6 hours PRN Nausea  diphenhydrAMINE 25 milliGRAM(s) Oral every 6 hours PRN Pruritus  HYDROmorphone  Injectable 0.5 milliGRAM(s) IV Push every 10 minutes PRN Moderate Pain (4 - 6)  lanolin Ointment 1 Application(s) Topical every 6 hours PRN Sore Nipples  magnesium hydroxide Suspension 30 milliLiter(s) Oral two times a day PRN Constipation  nalbuphine Injectable 2.5 milliGRAM(s) IV Push every 6 hours PRN Pruritus  naloxone Injectable 0.1 milliGRAM(s) IV Push every 3 minutes PRN For ANY of the following changes in patient status:  A. Breaths Per Minute LESS THAN 10, B. Oxygen saturation LESS THAN 90%, C. Sedation score of 6 for Stop After: 4 Times  ondansetron Injectable 4 milliGRAM(s) IV Push every 6 hours PRN Nausea  ondansetron Injectable 4 milliGRAM(s) IV Push once PRN Nausea and/or Vomiting  oxyCODONE    IR 5 milliGRAM(s) Oral once PRN Moderate to Severe Pain (4-10)  oxyCODONE    IR 5 milliGRAM(s) Oral every 3 hours PRN Moderate to Severe Pain (4-10)  senna 1 Tablet(s) Oral two times a day PRN Constipation  simethicone 80 milliGRAM(s) Chew every 4 hours PRN Gas  sodium chloride 0.65% Nasal 1 Spray(s) Both Nostrils four times a day PRN Nasal Congestion        Abdomen soft  fundus firm  Incision clean dry and intact  extremities non tender  lochia wnl                          9.8    15.42 )-----------( 165      ( 13 Mar 2024 06:22 )             30.4     Patient doing well    Routine post operative care
Postop Day  __1_ s/p   C- Section    THERAPY:  [  ] Spinal morphine   [ x ] Epidural morphine   [  ] IV PCA Hydromorphone 1 mg/ml            Pain:   ______0_____ at rest;  ____2_______with activity    Sedation Score:	  [ x ] Alert	    [  ] Drowsy        [  ] Arousable	[  ] Asleep	[  ] Unresponsive    Side Effects:	  [x  ] None	     [  ] Nausea        [  ] Pruritus        [  ] Weakness   [  ] Numbness        ASSESSMENT/ PLAN  [   ] Side effects resolving      [ x  ] Patient made aware of PRN meds available     [ ] Discontinue & switch to PRN pain medications        [  ] Continue       Patient states lower extremities feel and move normally. No apparent anesthetic complications.
R1 Progress Note    Patient seen and examined at bedside, no acute overnight events. No acute complaints, pain well controlled. Patient is ambulating and tolerating regular diet. Has not yet passed flatus. Patel is still in place. Denies CP, SOB, N/V, HA, blurred vision, epigastric pain.    Vital Signs Last 24 Hours  T(C): 36.5 (03-13-24 @ 01:58), Max: 37.2 (03-12-24 @ 14:01)  HR: 84 (03-13-24 @ 01:58) (83 - 116)  BP: 127/77 (03-13-24 @ 01:58) (88/53 - 149/67)  RR: 17 (03-13-24 @ 01:58) (11 - 23)  SpO2: 96% (03-13-24 @ 01:58) (85% - 100%)    I&O's Summary    11 Mar 2024 07:01  -  12 Mar 2024 07:00  --------------------------------------------------------  IN: 2900 mL / OUT: 2950 mL / NET: -50 mL    12 Mar 2024 07:01  -  13 Mar 2024 05:54  --------------------------------------------------------  IN: 5050 mL / OUT: 2489 mL / NET: 2561 mL        Physical Exam:  General: NAD  Abdomen: Soft, non-tender, non-distended, fundus firm  Incision: Pfannenstiel incision CDI, subcuticular suture closure, prineo dermabond dressing   Pelvic: Lochia wnl    Labs:    Blood Type: O Positive  Antibody Screen: --  RPR: Negative               12.3   9.65  )-----------( 189      ( 03-10 @ 08:53 )             36.3         MEDICATIONS  (STANDING):  acetaminophen     Tablet .. 975 milliGRAM(s) Oral <User Schedule>  diphtheria/tetanus/pertussis (acellular) Vaccine (Adacel) 0.5 milliLiter(s) IntraMuscular once  heparin   Injectable 5000 Unit(s) SubCutaneous every 12 hours  ibuprofen  Tablet. 600 milliGRAM(s) Oral every 6 hours  ketorolac   Injectable 30 milliGRAM(s) IV Push every 6 hours  lactated ringers. 1000 milliLiter(s) (125 mL/Hr) IV Continuous <Continuous>  oxytocin Infusion 333.333 milliUNIT(s)/Min (1000 mL/Hr) IV Continuous <Continuous>  oxytocin Infusion 333.333 milliUNIT(s)/Min (1000 mL/Hr) IV Continuous <Continuous>  oxytocin Infusion. 2 milliUNIT(s)/Min (2 mL/Hr) IV Continuous <Continuous>  oxytocin Infusion. 2 milliUNIT(s)/Min (2 mL/Hr) IV Continuous <Continuous>  sertraline 50 milliGRAM(s) Oral daily    MEDICATIONS  (PRN):  dexAMETHasone  Injectable 4 milliGRAM(s) IV Push every 6 hours PRN Nausea  diphenhydrAMINE 25 milliGRAM(s) Oral every 6 hours PRN Pruritus  HYDROmorphone  Injectable 0.5 milliGRAM(s) IV Push every 10 minutes PRN Moderate Pain (4 - 6)  lanolin Ointment 1 Application(s) Topical every 6 hours PRN Sore Nipples  magnesium hydroxide Suspension 30 milliLiter(s) Oral two times a day PRN Constipation  nalbuphine Injectable 2.5 milliGRAM(s) IV Push every 6 hours PRN Pruritus  naloxone Injectable 0.1 milliGRAM(s) IV Push every 3 minutes PRN For ANY of the following changes in patient status:  A. Breaths Per Minute LESS THAN 10, B. Oxygen saturation LESS THAN 90%, C. Sedation score of 6 for Stop After: 4 Times  ondansetron Injectable 4 milliGRAM(s) IV Push every 6 hours PRN Nausea  ondansetron Injectable 4 milliGRAM(s) IV Push once PRN Nausea and/or Vomiting  oxyCODONE    IR 5 milliGRAM(s) Oral every 3 hours PRN Moderate to Severe Pain (4-10)  oxyCODONE    IR 5 milliGRAM(s) Oral once PRN Moderate to Severe Pain (4-10)  simethicone 80 milliGRAM(s) Chew every 4 hours PRN Gas  sodium chloride 0.65% Nasal 1 Spray(s) Both Nostrils four times a day PRN Nasal Congestion  
R1 Progress Note    Patient seen and examined at bedside, no acute overnight events. No acute complaints, pain well controlled. Patient is ambulating, voiding, and tolerating regular diet. Passing flatus. Denies CP, SOB, N/V, HA, blurred vision, epigastric pain.    Vital Signs Last 24 Hours  T(C): 36.7 (03-14-24 @ 06:00), Max: 36.7 (03-13-24 @ 10:00)  HR: 87 (03-14-24 @ 06:00) (83 - 89)  BP: 97/54 (03-14-24 @ 06:00) (97/54 - 118/67)  RR: 19 (03-14-24 @ 06:00) (18 - 19)  SpO2: 100% (03-14-24 @ 06:00) (99% - 100%)    I&O's Summary    12 Mar 2024 07:01  -  13 Mar 2024 07:00  --------------------------------------------------------  IN: 5050 mL / OUT: 2789 mL / NET: 2261 mL    13 Mar 2024 07:01  -  14 Mar 2024 06:44  --------------------------------------------------------  IN: 0 mL / OUT: 950 mL / NET: -950 mL        Physical Exam:  General: NAD  Abdomen: Soft, non-tender, non-distended, fundus firm  Incision: Pfannenstiel incision CDI, subcuticular suture closure  Pelvic: Lochia wnl    Labs:    Blood Type: O Positive  Antibody Screen: --  RPR: Negative               9.8    15.42 )-----------( 165      ( 03-13 @ 06:22 )             30.4                12.3   9.65  )-----------( 189      ( 03-10 @ 08:53 )             36.3         MEDICATIONS  (STANDING):  acetaminophen     Tablet .. 975 milliGRAM(s) Oral <User Schedule>  ascorbic acid 500 milliGRAM(s) Oral daily  diphtheria/tetanus/pertussis (acellular) Vaccine (Adacel) 0.5 milliLiter(s) IntraMuscular once  ferrous    sulfate 325 milliGRAM(s) Oral three times a day  heparin   Injectable 5000 Unit(s) SubCutaneous every 12 hours  ibuprofen  Tablet. 600 milliGRAM(s) Oral every 6 hours  lactated ringers. 1000 milliLiter(s) (125 mL/Hr) IV Continuous <Continuous>  sertraline 50 milliGRAM(s) Oral daily    MEDICATIONS  (PRN):  diphenhydrAMINE 25 milliGRAM(s) Oral every 6 hours PRN Pruritus  lanolin Ointment 1 Application(s) Topical every 6 hours PRN Sore Nipples  magnesium hydroxide Suspension 30 milliLiter(s) Oral two times a day PRN Constipation  oxyCODONE    IR 5 milliGRAM(s) Oral once PRN Moderate to Severe Pain (4-10)  oxyCODONE    IR 5 milliGRAM(s) Oral every 3 hours PRN Moderate to Severe Pain (4-10)  senna 1 Tablet(s) Oral two times a day PRN Constipation  simethicone 80 milliGRAM(s) Chew every 4 hours PRN Gas  sodium chloride 0.65% Nasal 1 Spray(s) Both Nostrils four times a day PRN Nasal Congestion

## 2024-03-14 NOTE — PROGRESS NOTE ADULT - ASSESSMENT
34y/o  POD#2 from pLTCS (EBL 1019mL) for failed IOL done under general anesthesia 2/2 patient pain. Patient is hemodynamically stable and progressing well post-op.    #Postpartum state  - Continue with po analgesia  - Increase ambulation  - Continue regular diet  - H/H 12.3/36.3->9.8/30.4  - DVT prophylaxis with 5000u heparin  - Discharge planning     Diane Shields PGY-1   
32y/o  POD#1 from pLTCS (EBL 1019mL) for failed IOL done under general anesthesia 2/2 patient pain. Patient is hemodynamically stable and progressing well post-op.    #Postpartum state  - Continue with po analgesia  - Increase ambulation  - Continue regular diet  - Check CBC  - DVT prophylaxis with 5000u heparin    Diane Shields PGY-1

## 2024-03-14 NOTE — DISCHARGE NOTE OB - BREASTFEEDING PROVIDES MATERNAL HEALTH BENEFITS, DECREASED PREMENOPAUSAL BREAST CANCER, OVARIAN CANCER AND TYPE II DIABETES MELLITUS
Afib rate controlled currently  Continue lopressor  INR > 6  Continue to hold coumadin  INR in am
Statement Selected

## 2024-03-18 ENCOUNTER — APPOINTMENT (OUTPATIENT)
Age: 34
End: 2024-03-18

## 2025-02-08 NOTE — OB RN PATIENT PROFILE - NS PRO RUBELLA RECEIVED Y/N
Continues on amlodipine valsartan 5/1/1961  Orders:    CBC and differential; Future    Comprehensive metabolic panel; Future     no...

## 2025-03-07 NOTE — OB RN PATIENT PROFILE - AS HEIGHT TYPE
[Normal] : normal rate, regular rhythm, normal S1 and S2 and no murmur heard [Speech Grossly Normal] : speech grossly normal [Memory Grossly Normal] : memory grossly normal [Normal Affect] : the affect was normal [Alert and Oriented x3] : oriented to person, place, and time [Normal Insight/Judgement] : insight and judgment were intact [de-identified] : depressed mood n/a stated

## 2025-06-26 NOTE — DIETITIAN INITIAL EVALUATION ADULT. - OBTAIN DAILY WEIGHT
Informed Consent for Blood Component Transfusion Note    I have discussed with the patient the rationale for blood component transfusion; its benefits in treating or preventing fatigue, organ damage, or death; and its risk which includes mild transfusion reactions, rare risk of blood borne infection, or more serious but rare reactions. I have discussed the alternatives to transfusion, including the risk and consequences of not receiving transfusion. The patient had an opportunity to ask questions and had agreed to proceed with transfusion of blood components.    Electronically signed by ESAU Jacobs CNP on 6/26/25 at 2:22 PM EDT  
yes

## (undated) DEVICE — D HELP - CLEARVIEW CLEARIFY SYSTEM

## (undated) DEVICE — TROCAR COVIDIEN VERSAPORT BLADELESS OPTICAL 5MM STANDARD

## (undated) DEVICE — TROCAR COVIDIEN VERSAPORT BLADELESS OPTICAL 11MM STANDARD

## (undated) DEVICE — UTERINE MANIPULATOR COOPER SURGICAL 5MM 33CM GREEN

## (undated) DEVICE — WARMING BLANKET UPPER ADULT

## (undated) DEVICE — SUT POLYSORB 2-0 30" GU-46

## (undated) DEVICE — DRAPE 3/4 SHEET 52X76"

## (undated) DEVICE — LIGASURE MARYLAND 37CM

## (undated) DEVICE — SOL IRR BAG NS 0.9% 3000ML

## (undated) DEVICE — DRAPE MAYO STAND 23"

## (undated) DEVICE — FOLEY TRAY 14FR 5CC LF UMETER CLOSED

## (undated) DEVICE — SOL INJ NS 0.9% 1000ML

## (undated) DEVICE — VENODYNE/SCD SLEEVE CALF LARGE

## (undated) DEVICE — PREP KIT SKIN PREP DRY

## (undated) DEVICE — PLV/PSP-ESU T7E14761DX: Type: DURABLE MEDICAL EQUIPMENT

## (undated) DEVICE — VENODYNE/SCD SLEEVE CALF MEDIUM

## (undated) DEVICE — DRAPE LIGHT HANDLE COVER (GREEN)

## (undated) DEVICE — PACK GYN LAPAROSCOPY

## (undated) DEVICE — DRSG STERISTRIPS 0.5 X 4"

## (undated) DEVICE — DRAPE TOWEL BLUE 17" X 24"

## (undated) DEVICE — SYR LUER LOK 10CC

## (undated) DEVICE — GLV 5.5 PROTEXIS (WHITE)

## (undated) DEVICE — GLV 6 PROTEXIS (WHITE)

## (undated) DEVICE — DRSG MASTISOL

## (undated) DEVICE — INSUFFLATION NDL COVIDIEN SURGINEEDLE VERESS 120MM

## (undated) DEVICE — DRSG DERMABOND 0.7ML

## (undated) DEVICE — SMOKE EVACUTATION SYS LAPROSCOPIC AC/PA

## (undated) DEVICE — SOL IRR POUR NS 0.9% 1000ML

## (undated) DEVICE — GLV 6 PROTEXIS (BLUE)

## (undated) DEVICE — TROCAR COVIDIEN VERSAONE FIXATION CANNULA 5MM

## (undated) DEVICE — ELCTR GROUNDING PAD ADULT COVIDIEN

## (undated) DEVICE — DRSG DERMABOND PRINEO 22CM

## (undated) DEVICE — SUT MONOCRYL 4-0 27" PS-2 UNDYED

## (undated) DEVICE — PREP CHLORAPREP HI-LITE ORANGE 26ML

## (undated) DEVICE — TUBING STRYKER HIGH FLOW HEATED INSUFFLATOR

## (undated) DEVICE — TUBING SUCTION 20FT

## (undated) DEVICE — ENDOCATCH 10MM SPECIMEN POUCH

## (undated) DEVICE — TUBING STRYKEFLOW II SUCTION / IRRIGATOR